# Patient Record
Sex: FEMALE
[De-identification: names, ages, dates, MRNs, and addresses within clinical notes are randomized per-mention and may not be internally consistent; named-entity substitution may affect disease eponyms.]

---

## 2017-04-06 ENCOUNTER — TRANSCRIPTION ENCOUNTER (OUTPATIENT)
Age: 52
End: 2017-04-06

## 2017-04-17 ENCOUNTER — FORM ENCOUNTER (OUTPATIENT)
Age: 52
End: 2017-04-17

## 2017-04-17 ENCOUNTER — APPOINTMENT (OUTPATIENT)
Dept: ORTHOPEDIC SURGERY | Facility: CLINIC | Age: 52
End: 2017-04-17

## 2017-04-17 VITALS
SYSTOLIC BLOOD PRESSURE: 133 MMHG | DIASTOLIC BLOOD PRESSURE: 83 MMHG | BODY MASS INDEX: 35.44 KG/M2 | HEART RATE: 80 BPM | WEIGHT: 200 LBS | HEIGHT: 63 IN

## 2017-04-17 DIAGNOSIS — Z78.9 OTHER SPECIFIED HEALTH STATUS: ICD-10-CM

## 2017-04-18 ENCOUNTER — APPOINTMENT (OUTPATIENT)
Dept: MRI IMAGING | Facility: CLINIC | Age: 52
End: 2017-04-18

## 2017-04-18 ENCOUNTER — OUTPATIENT (OUTPATIENT)
Dept: OUTPATIENT SERVICES | Facility: HOSPITAL | Age: 52
LOS: 1 days | End: 2017-04-18
Payer: COMMERCIAL

## 2017-04-18 DIAGNOSIS — Z98.89 OTHER SPECIFIED POSTPROCEDURAL STATES: Chronic | ICD-10-CM

## 2017-04-18 DIAGNOSIS — M25.562 PAIN IN LEFT KNEE: ICD-10-CM

## 2017-04-18 PROCEDURE — 73721 MRI JNT OF LWR EXTRE W/O DYE: CPT

## 2017-04-24 ENCOUNTER — APPOINTMENT (OUTPATIENT)
Dept: ORTHOPEDIC SURGERY | Facility: CLINIC | Age: 52
End: 2017-04-24

## 2017-04-24 VITALS
DIASTOLIC BLOOD PRESSURE: 84 MMHG | HEIGHT: 63 IN | HEART RATE: 81 BPM | WEIGHT: 200 LBS | BODY MASS INDEX: 35.44 KG/M2 | SYSTOLIC BLOOD PRESSURE: 136 MMHG

## 2017-06-08 ENCOUNTER — OTHER (OUTPATIENT)
Age: 52
End: 2017-06-08

## 2017-06-09 ENCOUNTER — OTHER (OUTPATIENT)
Age: 52
End: 2017-06-09

## 2017-06-12 ENCOUNTER — APPOINTMENT (OUTPATIENT)
Dept: ORTHOPEDIC SURGERY | Facility: CLINIC | Age: 52
End: 2017-06-12

## 2017-07-19 ENCOUNTER — EMERGENCY (EMERGENCY)
Facility: HOSPITAL | Age: 52
LOS: 1 days | Discharge: HOME | End: 2017-07-19
Attending: EMERGENCY MEDICINE | Admitting: EMERGENCY MEDICINE
Payer: COMMERCIAL

## 2017-07-19 VITALS
SYSTOLIC BLOOD PRESSURE: 120 MMHG | DIASTOLIC BLOOD PRESSURE: 70 MMHG | OXYGEN SATURATION: 99 % | RESPIRATION RATE: 18 BRPM | HEART RATE: 78 BPM | TEMPERATURE: 98 F

## 2017-07-19 VITALS
HEIGHT: 63 IN | SYSTOLIC BLOOD PRESSURE: 147 MMHG | HEART RATE: 73 BPM | WEIGHT: 199.96 LBS | TEMPERATURE: 98 F | RESPIRATION RATE: 15 BRPM | OXYGEN SATURATION: 100 % | DIASTOLIC BLOOD PRESSURE: 80 MMHG

## 2017-07-19 DIAGNOSIS — Z90.710 ACQUIRED ABSENCE OF BOTH CERVIX AND UTERUS: Chronic | ICD-10-CM

## 2017-07-19 DIAGNOSIS — Z98.89 OTHER SPECIFIED POSTPROCEDURAL STATES: Chronic | ICD-10-CM

## 2017-07-19 LAB
ALBUMIN SERPL ELPH-MCNC: 3.6 G/DL — SIGNIFICANT CHANGE UP (ref 3.3–5)
ALP SERPL-CCNC: 85 U/L — SIGNIFICANT CHANGE UP (ref 30–120)
ALT FLD-CCNC: 22 U/L DA — SIGNIFICANT CHANGE UP (ref 10–60)
ANION GAP SERPL CALC-SCNC: 6 MMOL/L — SIGNIFICANT CHANGE UP (ref 5–17)
APPEARANCE UR: CLEAR — SIGNIFICANT CHANGE UP
APTT BLD: 33.1 SEC — SIGNIFICANT CHANGE UP (ref 27.5–37.4)
AST SERPL-CCNC: 16 U/L — SIGNIFICANT CHANGE UP (ref 10–40)
BACTERIA # UR AUTO: ABNORMAL
BASOPHILS # BLD AUTO: 0.1 K/UL — SIGNIFICANT CHANGE UP (ref 0–0.2)
BASOPHILS NFR BLD AUTO: 1.1 % — SIGNIFICANT CHANGE UP (ref 0–2)
BILIRUB SERPL-MCNC: 0.4 MG/DL — SIGNIFICANT CHANGE UP (ref 0.2–1.2)
BILIRUB UR-MCNC: NEGATIVE — SIGNIFICANT CHANGE UP
BUN SERPL-MCNC: 10 MG/DL — SIGNIFICANT CHANGE UP (ref 7–23)
CALCIUM SERPL-MCNC: 9.8 MG/DL — SIGNIFICANT CHANGE UP (ref 8.4–10.5)
CHLORIDE SERPL-SCNC: 103 MMOL/L — SIGNIFICANT CHANGE UP (ref 96–108)
CK MB CFR SERPL CALC: 0.4 NG/ML — SIGNIFICANT CHANGE UP (ref 0–3.6)
CO2 SERPL-SCNC: 29 MMOL/L — SIGNIFICANT CHANGE UP (ref 22–31)
COLOR SPEC: SIGNIFICANT CHANGE UP
CREAT SERPL-MCNC: 0.76 MG/DL — SIGNIFICANT CHANGE UP (ref 0.5–1.3)
DIFF PNL FLD: ABNORMAL
EOSINOPHIL # BLD AUTO: 0.5 K/UL — SIGNIFICANT CHANGE UP (ref 0–0.5)
EOSINOPHIL NFR BLD AUTO: 5.1 % — SIGNIFICANT CHANGE UP (ref 0–6)
EPI CELLS # UR: SIGNIFICANT CHANGE UP
GLUCOSE SERPL-MCNC: 97 MG/DL — SIGNIFICANT CHANGE UP (ref 70–99)
GLUCOSE UR QL: NEGATIVE MG/DL — SIGNIFICANT CHANGE UP
HCT VFR BLD CALC: 40.5 % — SIGNIFICANT CHANGE UP (ref 34.5–45)
HGB BLD-MCNC: 12.7 G/DL — SIGNIFICANT CHANGE UP (ref 11.5–15.5)
INR BLD: 1 RATIO — SIGNIFICANT CHANGE UP (ref 0.88–1.16)
KETONES UR-MCNC: NEGATIVE — SIGNIFICANT CHANGE UP
LEUKOCYTE ESTERASE UR-ACNC: NEGATIVE — SIGNIFICANT CHANGE UP
LYMPHOCYTES # BLD AUTO: 2.6 K/UL — SIGNIFICANT CHANGE UP (ref 1–3.3)
LYMPHOCYTES # BLD AUTO: 26.2 % — SIGNIFICANT CHANGE UP (ref 13–44)
MCHC RBC-ENTMCNC: 25.4 PG — LOW (ref 27–34)
MCHC RBC-ENTMCNC: 31.3 GM/DL — LOW (ref 32–36)
MCV RBC AUTO: 81 FL — SIGNIFICANT CHANGE UP (ref 80–100)
MONOCYTES # BLD AUTO: 0.7 K/UL — SIGNIFICANT CHANGE UP (ref 0–0.9)
MONOCYTES NFR BLD AUTO: 7.5 % — SIGNIFICANT CHANGE UP (ref 2–14)
NEUTROPHILS # BLD AUTO: 6 K/UL — SIGNIFICANT CHANGE UP (ref 1.8–7.4)
NEUTROPHILS NFR BLD AUTO: 60.2 % — SIGNIFICANT CHANGE UP (ref 43–77)
NITRITE UR-MCNC: NEGATIVE — SIGNIFICANT CHANGE UP
PH UR: 6 — SIGNIFICANT CHANGE UP (ref 5–8)
PLATELET # BLD AUTO: 261 K/UL — SIGNIFICANT CHANGE UP (ref 150–400)
POTASSIUM SERPL-MCNC: 4.1 MMOL/L — SIGNIFICANT CHANGE UP (ref 3.5–5.3)
POTASSIUM SERPL-SCNC: 4.1 MMOL/L — SIGNIFICANT CHANGE UP (ref 3.5–5.3)
PROT SERPL-MCNC: 7.6 G/DL — SIGNIFICANT CHANGE UP (ref 6–8.3)
PROT UR-MCNC: NEGATIVE MG/DL — SIGNIFICANT CHANGE UP
PROTHROM AB SERPL-ACNC: 10.9 SEC — SIGNIFICANT CHANGE UP (ref 9.8–12.7)
RBC # BLD: 5 M/UL — SIGNIFICANT CHANGE UP (ref 3.8–5.2)
RBC # FLD: 12.7 % — SIGNIFICANT CHANGE UP (ref 10.3–14.5)
RBC CASTS # UR COMP ASSIST: SIGNIFICANT CHANGE UP /HPF (ref 0–4)
SODIUM SERPL-SCNC: 138 MMOL/L — SIGNIFICANT CHANGE UP (ref 135–145)
SP GR SPEC: 1.01 — SIGNIFICANT CHANGE UP (ref 1.01–1.02)
TROPONIN I SERPL-MCNC: 0 NG/ML — LOW (ref 0.02–0.06)
UROBILINOGEN FLD QL: NEGATIVE MG/DL — SIGNIFICANT CHANGE UP
WBC # BLD: 10 K/UL — SIGNIFICANT CHANGE UP (ref 3.8–10.5)
WBC # FLD AUTO: 10 K/UL — SIGNIFICANT CHANGE UP (ref 3.8–10.5)
WBC UR QL: SIGNIFICANT CHANGE UP

## 2017-07-19 PROCEDURE — 93010 ELECTROCARDIOGRAM REPORT: CPT

## 2017-07-19 PROCEDURE — 84484 ASSAY OF TROPONIN QUANT: CPT

## 2017-07-19 PROCEDURE — 99285 EMERGENCY DEPT VISIT HI MDM: CPT

## 2017-07-19 PROCEDURE — 85027 COMPLETE CBC AUTOMATED: CPT

## 2017-07-19 PROCEDURE — 81001 URINALYSIS AUTO W/SCOPE: CPT

## 2017-07-19 PROCEDURE — 82553 CREATINE MB FRACTION: CPT

## 2017-07-19 PROCEDURE — 80053 COMPREHEN METABOLIC PANEL: CPT

## 2017-07-19 PROCEDURE — 36415 COLL VENOUS BLD VENIPUNCTURE: CPT

## 2017-07-19 PROCEDURE — 71046 X-RAY EXAM CHEST 2 VIEWS: CPT

## 2017-07-19 PROCEDURE — 85730 THROMBOPLASTIN TIME PARTIAL: CPT

## 2017-07-19 PROCEDURE — 71020: CPT | Mod: 26

## 2017-07-19 PROCEDURE — 99284 EMERGENCY DEPT VISIT MOD MDM: CPT | Mod: 25

## 2017-07-19 PROCEDURE — 85610 PROTHROMBIN TIME: CPT

## 2017-07-19 PROCEDURE — 93005 ELECTROCARDIOGRAM TRACING: CPT

## 2017-07-19 NOTE — ED PROVIDER NOTE - MEDICAL DECISION MAKING DETAILS
51 yo female with chest pain since last night now resolved. PE normal, EKG normal. Unlikely to be CAD. Plan CE x 1, observe, refer for outpt workup.

## 2017-07-19 NOTE — ED PROVIDER NOTE - OBJECTIVE STATEMENT
53 y/o F pt w/ PMHx of HLD presents to the ED c/o CP. Pt states that last night she was having CP and today she is experiencing chest tightness and some SOB and dizziness, no CP today. Pt states she was just watching TV when she started noticing her symptoms. Pt believes she has some FHx of heart problems. Pt denies fever, cough, pleuritic pain, N/V/D or any other complaints at this time.

## 2017-07-19 NOTE — ED ADULT NURSE NOTE - OBJECTIVE STATEMENT
Patient walked into ER c/o pressure and heaviness to left upper chest  which started last night.  Patient took a baby aspirin at 06:30 today.  EKG done as soon as patient on stretcher.

## 2017-08-17 ENCOUNTER — LABORATORY RESULT (OUTPATIENT)
Age: 52
End: 2017-08-17

## 2017-08-17 ENCOUNTER — APPOINTMENT (OUTPATIENT)
Dept: FAMILY MEDICINE | Facility: CLINIC | Age: 52
End: 2017-08-17
Payer: COMMERCIAL

## 2017-08-17 ENCOUNTER — RESULT REVIEW (OUTPATIENT)
Age: 52
End: 2017-08-17

## 2017-08-17 VITALS
DIASTOLIC BLOOD PRESSURE: 60 MMHG | HEIGHT: 63 IN | WEIGHT: 200 LBS | BODY MASS INDEX: 35.44 KG/M2 | RESPIRATION RATE: 16 BRPM | SYSTOLIC BLOOD PRESSURE: 100 MMHG | HEART RATE: 72 BPM

## 2017-08-17 DIAGNOSIS — R23.8 OTHER SKIN CHANGES: ICD-10-CM

## 2017-08-17 PROCEDURE — 96127 BRIEF EMOTIONAL/BEHAV ASSMT: CPT

## 2017-08-17 PROCEDURE — 99386 PREV VISIT NEW AGE 40-64: CPT | Mod: 25

## 2017-08-17 PROCEDURE — 93000 ELECTROCARDIOGRAM COMPLETE: CPT

## 2017-08-17 PROCEDURE — 36415 COLL VENOUS BLD VENIPUNCTURE: CPT

## 2017-08-17 PROCEDURE — 81003 URINALYSIS AUTO W/O SCOPE: CPT | Mod: QW

## 2017-09-12 ENCOUNTER — APPOINTMENT (OUTPATIENT)
Dept: FAMILY MEDICINE | Facility: CLINIC | Age: 52
End: 2017-09-12
Payer: COMMERCIAL

## 2017-09-12 VITALS
WEIGHT: 200 LBS | DIASTOLIC BLOOD PRESSURE: 90 MMHG | SYSTOLIC BLOOD PRESSURE: 125 MMHG | BODY MASS INDEX: 35.44 KG/M2 | HEIGHT: 63 IN

## 2017-09-12 DIAGNOSIS — Z87.898 PERSONAL HISTORY OF OTHER SPECIFIED CONDITIONS: ICD-10-CM

## 2017-09-12 DIAGNOSIS — Z82.49 FAMILY HISTORY OF ISCHEMIC HEART DISEASE AND OTHER DISEASES OF THE CIRCULATORY SYSTEM: ICD-10-CM

## 2017-09-12 PROCEDURE — 99215 OFFICE O/P EST HI 40 MIN: CPT | Mod: 25

## 2017-09-12 PROCEDURE — 90471 IMMUNIZATION ADMIN: CPT

## 2017-09-12 PROCEDURE — 90670 PCV13 VACCINE IM: CPT

## 2017-09-12 RX ORDER — SIMVASTATIN 5 MG/1
5 TABLET, FILM COATED ORAL
Qty: 90 | Refills: 3 | Status: DISCONTINUED | COMMUNITY
End: 2017-09-12

## 2017-10-03 ENCOUNTER — APPOINTMENT (OUTPATIENT)
Dept: MAMMOGRAPHY | Facility: CLINIC | Age: 52
End: 2017-10-03
Payer: COMMERCIAL

## 2017-10-03 ENCOUNTER — OUTPATIENT (OUTPATIENT)
Dept: OUTPATIENT SERVICES | Facility: HOSPITAL | Age: 52
LOS: 1 days | End: 2017-10-03
Payer: COMMERCIAL

## 2017-10-03 DIAGNOSIS — Z90.710 ACQUIRED ABSENCE OF BOTH CERVIX AND UTERUS: Chronic | ICD-10-CM

## 2017-10-03 DIAGNOSIS — Z00.8 ENCOUNTER FOR OTHER GENERAL EXAMINATION: ICD-10-CM

## 2017-10-03 DIAGNOSIS — Z98.89 OTHER SPECIFIED POSTPROCEDURAL STATES: Chronic | ICD-10-CM

## 2017-10-03 PROCEDURE — 77063 BREAST TOMOSYNTHESIS BI: CPT | Mod: 26

## 2017-10-03 PROCEDURE — 77063 BREAST TOMOSYNTHESIS BI: CPT

## 2017-10-03 PROCEDURE — 77067 SCR MAMMO BI INCL CAD: CPT

## 2017-10-03 PROCEDURE — G0202: CPT | Mod: 26

## 2018-01-20 ENCOUNTER — APPOINTMENT (OUTPATIENT)
Dept: FAMILY MEDICINE | Facility: CLINIC | Age: 53
End: 2018-01-20
Payer: COMMERCIAL

## 2018-01-20 VITALS
SYSTOLIC BLOOD PRESSURE: 130 MMHG | DIASTOLIC BLOOD PRESSURE: 84 MMHG | HEIGHT: 63 IN | OXYGEN SATURATION: 97 % | RESPIRATION RATE: 16 BRPM | TEMPERATURE: 98.6 F | HEART RATE: 78 BPM | WEIGHT: 214 LBS | BODY MASS INDEX: 37.92 KG/M2

## 2018-01-20 DIAGNOSIS — M25.562 PAIN IN LEFT KNEE: ICD-10-CM

## 2018-01-20 DIAGNOSIS — M79.606 PAIN IN LEG, UNSPECIFIED: ICD-10-CM

## 2018-01-20 LAB — CYTOLOGY CVX/VAG DOC THIN PREP: NEGATIVE

## 2018-01-20 PROCEDURE — 36415 COLL VENOUS BLD VENIPUNCTURE: CPT

## 2018-01-20 PROCEDURE — 99214 OFFICE O/P EST MOD 30 MIN: CPT | Mod: 25

## 2018-01-21 ENCOUNTER — MOBILE ON CALL (OUTPATIENT)
Age: 53
End: 2018-01-21

## 2018-01-22 LAB
25(OH)D3 SERPL-MCNC: 22.5 NG/ML
ALBUMIN SERPL ELPH-MCNC: 4.3 G/DL
ALP BLD-CCNC: 70 U/L
ALT SERPL-CCNC: 21 U/L
ANION GAP SERPL CALC-SCNC: 11 MMOL/L
AST SERPL-CCNC: 22 U/L
BILIRUB SERPL-MCNC: 0.4 MG/DL
BUN SERPL-MCNC: 13 MG/DL
CALCIUM SERPL-MCNC: 10.5 MG/DL
CHLORIDE SERPL-SCNC: 100 MMOL/L
CHOLEST SERPL-MCNC: 205 MG/DL
CHOLEST/HDLC SERPL: 3.3 RATIO
CO2 SERPL-SCNC: 26 MMOL/L
CREAT SERPL-MCNC: 0.65 MG/DL
GLUCOSE SERPL-MCNC: 90 MG/DL
HDLC SERPL-MCNC: 63 MG/DL
LDLC SERPL CALC-MCNC: 116 MG/DL
POTASSIUM SERPL-SCNC: 4.6 MMOL/L
PROT SERPL-MCNC: 7.6 G/DL
SODIUM SERPL-SCNC: 137 MMOL/L
TRIGL SERPL-MCNC: 128 MG/DL

## 2018-01-23 ENCOUNTER — APPOINTMENT (OUTPATIENT)
Dept: FAMILY MEDICINE | Facility: CLINIC | Age: 53
End: 2018-01-23

## 2018-05-04 ENCOUNTER — APPOINTMENT (OUTPATIENT)
Dept: FAMILY MEDICINE | Facility: CLINIC | Age: 53
End: 2018-05-04
Payer: COMMERCIAL

## 2018-05-04 VITALS
HEIGHT: 63 IN | TEMPERATURE: 98.5 F | BODY MASS INDEX: 37.92 KG/M2 | SYSTOLIC BLOOD PRESSURE: 118 MMHG | RESPIRATION RATE: 16 BRPM | WEIGHT: 214 LBS | HEART RATE: 88 BPM | DIASTOLIC BLOOD PRESSURE: 82 MMHG | OXYGEN SATURATION: 98 %

## 2018-05-04 DIAGNOSIS — L30.9 DERMATITIS, UNSPECIFIED: ICD-10-CM

## 2018-05-04 DIAGNOSIS — Z91.010 ALLERGY TO PEANUTS: ICD-10-CM

## 2018-05-04 DIAGNOSIS — J30.9 ALLERGIC RHINITIS, UNSPECIFIED: ICD-10-CM

## 2018-05-04 LAB
BILIRUB UR QL STRIP: NORMAL
BILIRUB UR QL STRIP: NORMAL
CLARITY UR: CLEAR
CLARITY UR: NORMAL
COLLECTION METHOD: NORMAL
GLUCOSE UR-MCNC: NORMAL
GLUCOSE UR-MCNC: NORMAL
HCG UR QL: 0.2 EU/DL
HCG UR QL: 0.2 EU/DL
HGB UR QL STRIP.AUTO: NORMAL
HGB UR QL STRIP.AUTO: NORMAL
KETONES UR-MCNC: NORMAL
KETONES UR-MCNC: NORMAL
LEUKOCYTE ESTERASE UR QL STRIP: NORMAL
LEUKOCYTE ESTERASE UR QL STRIP: NORMAL
NITRITE UR QL STRIP: NORMAL
NITRITE UR QL STRIP: NORMAL
PH UR STRIP: 5.5
PH UR STRIP: 6
PROT UR STRIP-MCNC: NORMAL
PROT UR STRIP-MCNC: NORMAL
SP GR UR STRIP: 1.1
SP GR UR STRIP: >=1.03

## 2018-05-04 PROCEDURE — 99214 OFFICE O/P EST MOD 30 MIN: CPT | Mod: 25

## 2018-05-04 PROCEDURE — 81003 URINALYSIS AUTO W/O SCOPE: CPT | Mod: QW

## 2018-05-04 RX ORDER — NAPROXEN 500 MG/1
500 TABLET ORAL
Qty: 60 | Refills: 1 | Status: COMPLETED | COMMUNITY
Start: 2018-01-20 | End: 2018-05-04

## 2018-05-05 ENCOUNTER — MOBILE ON CALL (OUTPATIENT)
Age: 53
End: 2018-05-05

## 2018-05-07 LAB
APPEARANCE: CLEAR
BACTERIA UR CULT: NORMAL
BILIRUBIN URINE: NEGATIVE
BLOOD URINE: NEGATIVE
COLOR: YELLOW
GLUCOSE QUALITATIVE U: NEGATIVE MG/DL
KETONES URINE: NEGATIVE
LEUKOCYTE ESTERASE URINE: NEGATIVE
NITRITE URINE: NEGATIVE
PH URINE: 5.5
PROTEIN URINE: NEGATIVE MG/DL
SPECIFIC GRAVITY URINE: 1.02
UROBILINOGEN URINE: NEGATIVE MG/DL

## 2018-06-18 ENCOUNTER — INPATIENT (INPATIENT)
Facility: HOSPITAL | Age: 53
LOS: 0 days | Discharge: HOME | DRG: 69 | End: 2018-06-19
Attending: INTERNAL MEDICINE | Admitting: INTERNAL MEDICINE
Payer: COMMERCIAL

## 2018-06-18 VITALS
WEIGHT: 210.1 LBS | SYSTOLIC BLOOD PRESSURE: 144 MMHG | RESPIRATION RATE: 16 BRPM | TEMPERATURE: 98 F | HEIGHT: 63 IN | DIASTOLIC BLOOD PRESSURE: 85 MMHG | HEART RATE: 88 BPM | OXYGEN SATURATION: 98 %

## 2018-06-18 DIAGNOSIS — E78.5 HYPERLIPIDEMIA, UNSPECIFIED: ICD-10-CM

## 2018-06-18 DIAGNOSIS — Z29.9 ENCOUNTER FOR PROPHYLACTIC MEASURES, UNSPECIFIED: ICD-10-CM

## 2018-06-18 DIAGNOSIS — G45.9 TRANSIENT CEREBRAL ISCHEMIC ATTACK, UNSPECIFIED: ICD-10-CM

## 2018-06-18 DIAGNOSIS — E89.0 POSTPROCEDURAL HYPOTHYROIDISM: ICD-10-CM

## 2018-06-18 DIAGNOSIS — Z98.89 OTHER SPECIFIED POSTPROCEDURAL STATES: Chronic | ICD-10-CM

## 2018-06-18 DIAGNOSIS — Z90.710 ACQUIRED ABSENCE OF BOTH CERVIX AND UTERUS: Chronic | ICD-10-CM

## 2018-06-18 LAB
ALBUMIN SERPL ELPH-MCNC: 3.7 G/DL — SIGNIFICANT CHANGE UP (ref 3.3–5)
ALP SERPL-CCNC: 76 U/L — SIGNIFICANT CHANGE UP (ref 30–120)
ALT FLD-CCNC: 26 U/L DA — SIGNIFICANT CHANGE UP (ref 10–60)
ANION GAP SERPL CALC-SCNC: 9 MMOL/L — SIGNIFICANT CHANGE UP (ref 5–17)
APTT BLD: 33.1 SEC — SIGNIFICANT CHANGE UP (ref 27.5–37.4)
AST SERPL-CCNC: 21 U/L — SIGNIFICANT CHANGE UP (ref 10–40)
BASOPHILS # BLD AUTO: 0.1 K/UL — SIGNIFICANT CHANGE UP (ref 0–0.2)
BASOPHILS NFR BLD AUTO: 1 % — SIGNIFICANT CHANGE UP (ref 0–2)
BILIRUB SERPL-MCNC: 0.4 MG/DL — SIGNIFICANT CHANGE UP (ref 0.2–1.2)
BUN SERPL-MCNC: 14 MG/DL — SIGNIFICANT CHANGE UP (ref 7–23)
CALCIUM SERPL-MCNC: 9.9 MG/DL — SIGNIFICANT CHANGE UP (ref 8.4–10.5)
CHLORIDE SERPL-SCNC: 101 MMOL/L — SIGNIFICANT CHANGE UP (ref 96–108)
CK MB BLD-MCNC: 0.8 % — SIGNIFICANT CHANGE UP (ref 0–3.5)
CK MB CFR SERPL CALC: 0.9 NG/ML — SIGNIFICANT CHANGE UP (ref 0–3.6)
CK SERPL-CCNC: 115 U/L — SIGNIFICANT CHANGE UP (ref 26–192)
CO2 SERPL-SCNC: 26 MMOL/L — SIGNIFICANT CHANGE UP (ref 22–31)
CREAT SERPL-MCNC: 0.68 MG/DL — SIGNIFICANT CHANGE UP (ref 0.5–1.3)
EOSINOPHIL # BLD AUTO: 0.5 K/UL — SIGNIFICANT CHANGE UP (ref 0–0.5)
EOSINOPHIL NFR BLD AUTO: 4.9 % — SIGNIFICANT CHANGE UP (ref 0–6)
GLUCOSE BLDC GLUCOMTR-MCNC: 108 MG/DL — HIGH (ref 70–99)
GLUCOSE SERPL-MCNC: 107 MG/DL — HIGH (ref 70–99)
HCT VFR BLD CALC: 38.2 % — SIGNIFICANT CHANGE UP (ref 34.5–45)
HGB BLD-MCNC: 12.7 G/DL — SIGNIFICANT CHANGE UP (ref 11.5–15.5)
INR BLD: 1.04 RATIO — SIGNIFICANT CHANGE UP (ref 0.88–1.16)
LYMPHOCYTES # BLD AUTO: 2.7 K/UL — SIGNIFICANT CHANGE UP (ref 1–3.3)
LYMPHOCYTES # BLD AUTO: 24.6 % — SIGNIFICANT CHANGE UP (ref 13–44)
MCHC RBC-ENTMCNC: 26.8 PG — LOW (ref 27–34)
MCHC RBC-ENTMCNC: 33.2 GM/DL — SIGNIFICANT CHANGE UP (ref 32–36)
MCV RBC AUTO: 80.8 FL — SIGNIFICANT CHANGE UP (ref 80–100)
MONOCYTES # BLD AUTO: 0.7 K/UL — SIGNIFICANT CHANGE UP (ref 0–0.9)
MONOCYTES NFR BLD AUTO: 6.3 % — SIGNIFICANT CHANGE UP (ref 2–14)
NEUTROPHILS # BLD AUTO: 6.8 K/UL — SIGNIFICANT CHANGE UP (ref 1.8–7.4)
NEUTROPHILS NFR BLD AUTO: 63.2 % — SIGNIFICANT CHANGE UP (ref 43–77)
PLATELET # BLD AUTO: 248 K/UL — SIGNIFICANT CHANGE UP (ref 150–400)
POTASSIUM SERPL-MCNC: 4 MMOL/L — SIGNIFICANT CHANGE UP (ref 3.5–5.3)
POTASSIUM SERPL-SCNC: 4 MMOL/L — SIGNIFICANT CHANGE UP (ref 3.5–5.3)
PROT SERPL-MCNC: 7.8 G/DL — SIGNIFICANT CHANGE UP (ref 6–8.3)
PROTHROM AB SERPL-ACNC: 11.4 SEC — SIGNIFICANT CHANGE UP (ref 9.8–12.7)
RBC # BLD: 4.72 M/UL — SIGNIFICANT CHANGE UP (ref 3.8–5.2)
RBC # FLD: 12.7 % — SIGNIFICANT CHANGE UP (ref 10.3–14.5)
SODIUM SERPL-SCNC: 136 MMOL/L — SIGNIFICANT CHANGE UP (ref 135–145)
TROPONIN I SERPL-MCNC: 0 NG/ML — LOW (ref 0.02–0.06)
WBC # BLD: 10.8 K/UL — HIGH (ref 3.8–10.5)
WBC # FLD AUTO: 10.8 K/UL — HIGH (ref 3.8–10.5)

## 2018-06-18 PROCEDURE — 70450 CT HEAD/BRAIN W/O DYE: CPT | Mod: 26,59

## 2018-06-18 PROCEDURE — 70498 CT ANGIOGRAPHY NECK: CPT | Mod: 26

## 2018-06-18 PROCEDURE — 70496 CT ANGIOGRAPHY HEAD: CPT | Mod: 26

## 2018-06-18 PROCEDURE — 93010 ELECTROCARDIOGRAM REPORT: CPT

## 2018-06-18 PROCEDURE — 71045 X-RAY EXAM CHEST 1 VIEW: CPT | Mod: 26

## 2018-06-18 PROCEDURE — 93306 TTE W/DOPPLER COMPLETE: CPT | Mod: 26

## 2018-06-18 PROCEDURE — 99255 IP/OBS CONSLTJ NEW/EST HI 80: CPT

## 2018-06-18 PROCEDURE — 99285 EMERGENCY DEPT VISIT HI MDM: CPT

## 2018-06-18 RX ORDER — SIMVASTATIN 20 MG/1
5 TABLET, FILM COATED ORAL AT BEDTIME
Qty: 0 | Refills: 0 | Status: DISCONTINUED | OUTPATIENT
Start: 2018-06-18 | End: 2018-06-19

## 2018-06-18 RX ORDER — ACETAMINOPHEN 500 MG
650 TABLET ORAL EVERY 6 HOURS
Qty: 0 | Refills: 0 | Status: DISCONTINUED | OUTPATIENT
Start: 2018-06-18 | End: 2018-06-19

## 2018-06-18 RX ORDER — ENOXAPARIN SODIUM 100 MG/ML
40 INJECTION SUBCUTANEOUS DAILY
Qty: 0 | Refills: 0 | Status: DISCONTINUED | OUTPATIENT
Start: 2018-06-18 | End: 2018-06-19

## 2018-06-18 RX ORDER — ASPIRIN/CALCIUM CARB/MAGNESIUM 324 MG
325 TABLET ORAL DAILY
Qty: 0 | Refills: 0 | Status: DISCONTINUED | OUTPATIENT
Start: 2018-06-18 | End: 2018-06-19

## 2018-06-18 RX ORDER — SODIUM CHLORIDE 9 MG/ML
3 INJECTION INTRAMUSCULAR; INTRAVENOUS; SUBCUTANEOUS EVERY 8 HOURS
Qty: 0 | Refills: 0 | Status: DISCONTINUED | OUTPATIENT
Start: 2018-06-18 | End: 2018-06-19

## 2018-06-18 RX ADMIN — SIMVASTATIN 5 MILLIGRAM(S): 20 TABLET, FILM COATED ORAL at 21:14

## 2018-06-18 RX ADMIN — ENOXAPARIN SODIUM 40 MILLIGRAM(S): 100 INJECTION SUBCUTANEOUS at 20:10

## 2018-06-18 RX ADMIN — SODIUM CHLORIDE 3 MILLILITER(S): 9 INJECTION INTRAMUSCULAR; INTRAVENOUS; SUBCUTANEOUS at 21:14

## 2018-06-18 NOTE — ED ADULT NURSE REASSESSMENT NOTE - NS ED NURSE REASSESS COMMENT FT1
Pt reported numbness of left side arm improving. Pt seen and examined by Dr Baca. Stroke protocol Discontinued

## 2018-06-18 NOTE — H&P ADULT - NEUROLOGICAL DETAILS
responds to verbal commands/sensation intact/cranial nerves intact/deep reflexes intact/superficial reflexes intact/normal strength/responds to pain/alert and oriented x 3

## 2018-06-18 NOTE — CONSULT NOTE ADULT - ASSESSMENT
Clinical impression is most likely right hemispheric TIA rule out cerebrovascular accident.    I would recommend telemetry evaluation to rule out underlying arrhythmia.  I would recommend echocardiogram to evaluate ejection fraction.  I would recommend MRI of the brain.  I would recommend to check TSH and lipid panel.  I will start the patient on aspirin 325 mg once a day.  I will recommend cholesterol medications.  I will continue to followup.

## 2018-06-18 NOTE — CONSULT NOTE ADULT - ASSESSMENT
PROBLEM Dx:  TIA  HYPERLIPIDEMIA  OBESITY PROBLEM Dx:  TIA  HYPERLIPIDEMIA  OBESITY    DISCUSSION:    52 y/o F pt with hx HLD and partial thyroidectomy presents to the ED c/o numbness and tingling sensation in the left arm and left side of face today at 10:55am at work. Pt was typing when she ad a sudden onset of numbness and tingling and momentary loss of strength in the left arm, lasting approximately a couple seconds. Pt has taken a Alcides aspirin this morning before onset of sx. Also relays that she felt a headache in the posterior aspect of the head yesterday, but did not have a headache today. Sx have begun to resolve on their own. Denies chest pain, nausea, vomiting, fever, headache, difficulty ambulating, slurring of speech, LOC, or confusion. No other complaints    ·  Problem: Transient cerebral ischemia, unspecified type.   aspirin, statin, cardiac monitoring,   2D ECHO  CAROTID DOPPLER  neuro evaluation     ·  Problem: Dyslipidemia.     statin, lipid profile      discussed with staff

## 2018-06-18 NOTE — ED PROVIDER NOTE - OBJECTIVE STATEMENT
52 y/o F pt with hx HLD and partial thyroidectomy presents to the ED c/o numbness and tingling sensation in the left arm and left side of face today at 10:55am at work. Pt was typing when she had a sudden onset of numbness and tingling and momentary loss of strength in the left arm, lasting approximately a couple seconds. Pt has taken a Alcides aspirin this morning before onset of sx. Also relays that she felt a headache in the posterior aspect of the head yesterday, but did not have a headache today. Denies chest pain, nausea, vomiting, fever, headache, difficulty ambulating, slurring of speech, LOC, or confusion. No other complaints at this time.     Dr. Ruth Rodriguez -pmd 54 y/o F pt with hx HLD and partial thyroidectomy presents to the ED c/o numbness and tingling sensation in the left arm and left side of face today at 10:55am at work. Pt was typing when she had a sudden onset of numbness and tingling and momentary loss of strength in the left arm, lasting approximately a couple seconds. Pt has taken a Alcides aspirin this morning before onset of sx. Also relays that she felt a headache in the posterior aspect of the head yesterday, but did not have a headache today. Sx have begun to resolve on their own. Denies chest pain, nausea, vomiting, fever, headache, difficulty ambulating, slurring of speech, LOC, or confusion. No other complaints at this time.     Dr. Ruth Rodriguez -pmd

## 2018-06-18 NOTE — CONSULT NOTE ADULT - SUBJECTIVE AND OBJECTIVE BOX
PCP:    REQUESTING PHYSICIAN:    REASON FOR CONSULT:    CHIEF COMPLAINT:    HPI:      PAST MEDICAL & SURGICAL HISTORY:  Dyslipidemia  H/O: hysterectomy  H/O partial thyroidectomy: 1997      Allergies    No Known Allergies    Intolerances        SOCIAL HISTORY:    FAMILY HISTORY:  Family history of hypertension      MEDICATIONS:  MEDICATIONS  (STANDING):    MEDICATIONS  (PRN):      REVIEW OF SYSTEMS:    CONSTITUTIONAL: No weakness, fevers or chills  EYES/ENT: No visual changes;  No vertigo or throat pain   NECK: No pain or stiffness  RESPIRATORY: No cough, wheezing, hemoptysis; No shortness of breath  CARDIOVASCULAR: No chest pain or palpitations  GASTROINTESTINAL: No abdominal or epigastric pain. No nausea, vomiting, or hematemesis; No diarrhea or constipation. No melena or hematochezia.  GENITOURINARY: No dysuria, frequency or hematuria  NEUROLOGICAL: No numbness or weakness  SKIN: No itching, burning, rashes, or lesions   All other review of systems is negative unless indicated above    Vital Signs Last 24 Hrs  T(C): 36.8 (18 Jun 2018 12:06), Max: 36.8 (18 Jun 2018 12:06)  T(F): 98.2 (18 Jun 2018 12:06), Max: 98.2 (18 Jun 2018 12:06)  HR: 88 (18 Jun 2018 12:06) (88 - 88)  BP: 144/85 (18 Jun 2018 12:06) (144/85 - 144/85)  BP(mean): --  RR: 16 (18 Jun 2018 12:06) (16 - 16)  SpO2: 98% (18 Jun 2018 12:06) (98% - 98%)    I&O's Summary      PHYSICAL EXAM:    Constitutional: NAD, awake and alert, well-developed  HEENT: PERR, EOMI,  No oral cyananosis.  Neck:  supple,  No JVD  Respiratory: Breath sounds are clear bilaterally, No wheezing, rales or rhonchi  Cardiovascular: S1 and S2, regular rate and rhythm, no Murmurs, gallops or rubs  Gastrointestinal: Bowel Sounds present, soft, nontender.   Extremities: No peripheral edema. No clubbing or cyanosis.  Vascular: 2+ peripheral pulses  Neurological: A/O x 3, no focal deficits  Musculoskeletal: no calf tenderness.  Skin: No rashes.      LABS: All Labs Reviewed:                        12.7   10.8  )-----------( 248      ( 18 Jun 2018 12:55 )             38.2     18 Jun 2018 12:55    136    |  101    |  14     ----------------------------<  107    4.0     |  26     |  0.68     Ca    9.9        18 Jun 2018 12:55    TPro  7.8    /  Alb  3.7    /  TBili  0.4    /  DBili  x      /  AST  21     /  ALT  26     /  AlkPhos  76     18 Jun 2018 12:55    PT/INR - ( 18 Jun 2018 12:55 )   PT: 11.4 sec;   INR: 1.04 ratio         PTT - ( 18 Jun 2018 12:55 )  PTT:33.1 sec  CARDIAC MARKERS ( 18 Jun 2018 12:55 )  .000 ng/mL / x     / 115 U/L / x     / 0.9 ng/mL      Blood Culture:     ECHO,CAROTID DOPPLER ORDERED    RADIOLOGY/EKG: PCP:    REQUESTING PHYSICIAN:    REASON FOR CONSULT:    CHIEF COMPLAINT:    HPI:  	  52 y/o F pt with hx HLD and partial thyroidectomy presents to the ED c/o numbness and tingling sensation in the left arm and left side of face today at 10:55am at work. Pt was typing when she had a sudden onset of numbness and tingling and momentary loss of strength in the left arm, lasting approximately a couple seconds. Pt has taken a Alcides aspirin this morning before onset of sx. Also relays that she felt a headache in the posterior aspect of the head yesterday, but did not have a headache today. Sx have begun to resolve on their own. Denies chest pain, nausea, vomiting, fever, headache, difficulty ambulating, slurring of speech, LOC, or confusion. No other complaints at this time.                      	      PAST MEDICAL & SURGICAL HISTORY:  Dyslipidemia  H/O: hysterectomy  H/O partial thyroidectomy: 1997      Allergies    No Known Allergies        SOCIAL HISTORY:  non smoker    FAMILY HISTORY:  Family history of hypertension    MEDICATIONS  (STANDING):  aspirin 325 milliGRAM(s) Oral daily  enoxaparin Injectable 40 milliGRAM(s) SubCutaneous daily  simvastatin 5 milliGRAM(s) Oral at bedtime  sodium chloride 0.9% lock flush 3 milliLiter(s) IV Push every 8 hours    MEDICATIONS  (PRN):  acetaminophen   Tablet. 650 milliGRAM(s) Oral every 6 hours PRN Mild Pain (1 - 3)      REVIEW OF SYSTEMS:    CONSTITUTIONAL: No weakness, fevers or chills  EYES/ENT: No visual changes;  No vertigo or throat pain   NECK: No pain or stiffness  RESPIRATORY: No cough, wheezing, hemoptysis; No shortness of breath  CARDIOVASCULAR: No chest pain or palpitations  GASTROINTESTINAL: No abdominal or epigastric pain. No nausea, vomiting, or hematemesis; No diarrhea or constipation. No melena or hematochezia.  GENITOURINARY: No dysuria, frequency or hematuria  NEUROLOGICAL: No numbness or weakness  SKIN: No itching, burning, rashes, or lesions   All other review of systems is negative unless indicated above    Vital Signs Last 24 Hrs  T(C): 36.8 (18 Jun 2018 12:06), Max: 36.8 (18 Jun 2018 12:06)  T(F): 98.2 (18 Jun 2018 12:06), Max: 98.2 (18 Jun 2018 12:06)  HR: 88 (18 Jun 2018 12:06) (88 - 88)  BP: 144/85 (18 Jun 2018 12:06) (144/85 - 144/85)  BP(mean): --  RR: 16 (18 Jun 2018 12:06) (16 - 16)  SpO2: 98% (18 Jun 2018 12:06) (98% - 98%)    I&O's Summary      PHYSICAL EXAM:    Constitutional: NAD, awake and alert, well-developed  HEENT: PERR, EOMI,  No oral cyananosis.  Neck:  supple,  No JVD  Respiratory: Breath sounds are clear bilaterally, No wheezing, rales or rhonchi  Cardiovascular: S1 and S2, regular rate and rhythm, no Murmurs, gallops or rubs  Gastrointestinal: Bowel Sounds present, soft, nontender.   Extremities: No peripheral edema. No clubbing or cyanosis.  Vascular: 2+ peripheral pulses  Neurological: A/O x 3, no focal deficits  Musculoskeletal: no calf tenderness.  Skin: No rashes.      LABS: All Labs Reviewed:                        12.7   10.8  )-----------( 248      ( 18 Jun 2018 12:55 )             38.2     18 Jun 2018 12:55    136    |  101    |  14     ----------------------------<  107    4.0     |  26     |  0.68     Ca    9.9        18 Jun 2018 12:55    TPro  7.8    /  Alb  3.7    /  TBili  0.4    /  DBili  x      /  AST  21     /  ALT  26     /  AlkPhos  76     18 Jun 2018 12:55    PT/INR - ( 18 Jun 2018 12:55 )   PT: 11.4 sec;   INR: 1.04 ratio         PTT - ( 18 Jun 2018 12:55 )  PTT:33.1 sec  CARDIAC MARKERS ( 18 Jun 2018 12:55 )  .000 ng/mL / x     / 115 U/L / x     / 0.9 ng/mL      Blood Culture:     ECHO,CAROTID DOPPLER ORDERED-pending    RADIOLOGY/EKG:  NSR,NON SPECIFIC ST-T CHANGES  [facility]

## 2018-06-19 ENCOUNTER — OUTPATIENT (OUTPATIENT)
Dept: OUTPATIENT SERVICES | Facility: HOSPITAL | Age: 53
LOS: 1 days | End: 2018-06-19
Payer: COMMERCIAL

## 2018-06-19 ENCOUNTER — TRANSCRIPTION ENCOUNTER (OUTPATIENT)
Age: 53
End: 2018-06-19

## 2018-06-19 VITALS
RESPIRATION RATE: 16 BRPM | SYSTOLIC BLOOD PRESSURE: 128 MMHG | TEMPERATURE: 98 F | DIASTOLIC BLOOD PRESSURE: 82 MMHG | HEART RATE: 88 BPM | OXYGEN SATURATION: 99 %

## 2018-06-19 DIAGNOSIS — Z90.710 ACQUIRED ABSENCE OF BOTH CERVIX AND UTERUS: Chronic | ICD-10-CM

## 2018-06-19 DIAGNOSIS — Z98.89 OTHER SPECIFIED POSTPROCEDURAL STATES: Chronic | ICD-10-CM

## 2018-06-19 DIAGNOSIS — R55 SYNCOPE AND COLLAPSE: ICD-10-CM

## 2018-06-19 LAB
ANION GAP SERPL CALC-SCNC: 5 MMOL/L — SIGNIFICANT CHANGE UP (ref 5–17)
BUN SERPL-MCNC: 11 MG/DL — SIGNIFICANT CHANGE UP (ref 7–23)
CALCIUM SERPL-MCNC: 10 MG/DL — SIGNIFICANT CHANGE UP (ref 8.4–10.5)
CHLORIDE SERPL-SCNC: 104 MMOL/L — SIGNIFICANT CHANGE UP (ref 96–108)
CHOLEST SERPL-MCNC: 195 MG/DL — SIGNIFICANT CHANGE UP (ref 10–199)
CO2 SERPL-SCNC: 29 MMOL/L — SIGNIFICANT CHANGE UP (ref 22–31)
CREAT SERPL-MCNC: 0.67 MG/DL — SIGNIFICANT CHANGE UP (ref 0.5–1.3)
GLUCOSE SERPL-MCNC: 112 MG/DL — HIGH (ref 70–99)
HCT VFR BLD CALC: 39.4 % — SIGNIFICANT CHANGE UP (ref 34.5–45)
HDLC SERPL-MCNC: 44 MG/DL — SIGNIFICANT CHANGE UP (ref 40–125)
HGB BLD-MCNC: 12.8 G/DL — SIGNIFICANT CHANGE UP (ref 11.5–15.5)
LIPID PNL WITH DIRECT LDL SERPL: 99 MG/DL — SIGNIFICANT CHANGE UP
MCHC RBC-ENTMCNC: 26.3 PG — LOW (ref 27–34)
MCHC RBC-ENTMCNC: 32.6 GM/DL — SIGNIFICANT CHANGE UP (ref 32–36)
MCV RBC AUTO: 80.8 FL — SIGNIFICANT CHANGE UP (ref 80–100)
PLATELET # BLD AUTO: 252 K/UL — SIGNIFICANT CHANGE UP (ref 150–400)
POTASSIUM SERPL-MCNC: 4.3 MMOL/L — SIGNIFICANT CHANGE UP (ref 3.5–5.3)
POTASSIUM SERPL-SCNC: 4.3 MMOL/L — SIGNIFICANT CHANGE UP (ref 3.5–5.3)
RBC # BLD: 4.87 M/UL — SIGNIFICANT CHANGE UP (ref 3.8–5.2)
RBC # FLD: 12.2 % — SIGNIFICANT CHANGE UP (ref 10.3–14.5)
SODIUM SERPL-SCNC: 138 MMOL/L — SIGNIFICANT CHANGE UP (ref 135–145)
T3 SERPL-MCNC: 122 NG/DL — SIGNIFICANT CHANGE UP (ref 80–200)
T4 AB SER-ACNC: 6.4 UG/DL — SIGNIFICANT CHANGE UP (ref 4.6–12)
TOTAL CHOLESTEROL/HDL RATIO MEASUREMENT: 4.4 RATIO — SIGNIFICANT CHANGE UP (ref 3.3–7.1)
TRIGL SERPL-MCNC: 262 MG/DL — HIGH (ref 10–149)
TSH SERPL-MCNC: 1.72 UIU/ML — SIGNIFICANT CHANGE UP (ref 0.27–4.2)
TSH SERPL-MCNC: 2.13 UIU/ML — SIGNIFICANT CHANGE UP (ref 0.27–4.2)
WBC # BLD: 8.6 K/UL — SIGNIFICANT CHANGE UP (ref 3.8–10.5)
WBC # FLD AUTO: 8.6 K/UL — SIGNIFICANT CHANGE UP (ref 3.8–10.5)

## 2018-06-19 PROCEDURE — 99291 CRITICAL CARE FIRST HOUR: CPT | Mod: 25

## 2018-06-19 PROCEDURE — 70551 MRI BRAIN STEM W/O DYE: CPT

## 2018-06-19 PROCEDURE — 70498 CT ANGIOGRAPHY NECK: CPT

## 2018-06-19 PROCEDURE — 84480 ASSAY TRIIODOTHYRONINE (T3): CPT

## 2018-06-19 PROCEDURE — 99232 SBSQ HOSP IP/OBS MODERATE 35: CPT

## 2018-06-19 PROCEDURE — 84436 ASSAY OF TOTAL THYROXINE: CPT

## 2018-06-19 PROCEDURE — 80061 LIPID PANEL: CPT

## 2018-06-19 PROCEDURE — 85730 THROMBOPLASTIN TIME PARTIAL: CPT

## 2018-06-19 PROCEDURE — 82962 GLUCOSE BLOOD TEST: CPT

## 2018-06-19 PROCEDURE — 82550 ASSAY OF CK (CPK): CPT

## 2018-06-19 PROCEDURE — 82553 CREATINE MB FRACTION: CPT

## 2018-06-19 PROCEDURE — 93005 ELECTROCARDIOGRAM TRACING: CPT

## 2018-06-19 PROCEDURE — 70496 CT ANGIOGRAPHY HEAD: CPT

## 2018-06-19 PROCEDURE — 85610 PROTHROMBIN TIME: CPT

## 2018-06-19 PROCEDURE — 70551 MRI BRAIN STEM W/O DYE: CPT | Mod: 26

## 2018-06-19 PROCEDURE — 70450 CT HEAD/BRAIN W/O DYE: CPT

## 2018-06-19 PROCEDURE — 71045 X-RAY EXAM CHEST 1 VIEW: CPT

## 2018-06-19 PROCEDURE — 36415 COLL VENOUS BLD VENIPUNCTURE: CPT

## 2018-06-19 PROCEDURE — 84443 ASSAY THYROID STIM HORMONE: CPT

## 2018-06-19 PROCEDURE — 93306 TTE W/DOPPLER COMPLETE: CPT

## 2018-06-19 PROCEDURE — 80053 COMPREHEN METABOLIC PANEL: CPT

## 2018-06-19 PROCEDURE — 85027 COMPLETE CBC AUTOMATED: CPT

## 2018-06-19 PROCEDURE — 84484 ASSAY OF TROPONIN QUANT: CPT

## 2018-06-19 PROCEDURE — 80048 BASIC METABOLIC PNL TOTAL CA: CPT

## 2018-06-19 RX ORDER — ASPIRIN/CALCIUM CARB/MAGNESIUM 324 MG
1 TABLET ORAL
Qty: 0 | Refills: 0 | DISCHARGE
Start: 2018-06-19

## 2018-06-19 RX ADMIN — Medication 325 MILLIGRAM(S): at 11:19

## 2018-06-19 RX ADMIN — ENOXAPARIN SODIUM 40 MILLIGRAM(S): 100 INJECTION SUBCUTANEOUS at 11:19

## 2018-06-19 RX ADMIN — SODIUM CHLORIDE 3 MILLILITER(S): 9 INJECTION INTRAMUSCULAR; INTRAVENOUS; SUBCUTANEOUS at 05:27

## 2018-06-19 NOTE — DISCHARGE NOTE ADULT - PATIENT PORTAL LINK FT
You can access the [facility] Patient Portal, offered by [facility], by registering with the following website: [email or web address]

## 2018-06-19 NOTE — PROGRESS NOTE ADULT - ATTENDING COMMENTS
60 minutes spent on this visit, 50% visit time spent in care co-ordination with other attendings and counselling patient  I have discussed care plan with patient and HCP,expressed understanding of problems treatment and their effect and side effects, alternatives in detail,I have asked if they have any questions and concerns and appropriately addressed them to best of my ability  Reviewed all diagonostic tests, lab results and drug drug interactions, and medications

## 2018-06-19 NOTE — PROGRESS NOTE ADULT - SUBJECTIVE AND OBJECTIVE BOX
Patient is a 53y old  Female who presents with a chief complaint of left side upper extremity weakness (19 Jun 2018 08:13)      INTERVAL HPI/OVERNIGHT EVENTS:  no acute event overnight  Home Medications:  simvastatin 5 mg oral tablet: 1 tab(s) orally once a day (at bedtime) (19 Jul 2017 12:07)      MEDICATIONS  (STANDING):  aspirin 325 milliGRAM(s) Oral daily  enoxaparin Injectable 40 milliGRAM(s) SubCutaneous daily  simvastatin 5 milliGRAM(s) Oral at bedtime  sodium chloride 0.9% lock flush 3 milliLiter(s) IV Push every 8 hours    MEDICATIONS  (PRN):  acetaminophen   Tablet. 650 milliGRAM(s) Oral every 6 hours PRN Mild Pain (1 - 3)      Allergies    No Known Allergies    Intolerances        REVIEW OF SYSTEMS:  CONSTITUTIONAL: No fever, weight loss, or fatigue  EYES: No eye pain, visual disturbances, or discharge  ENMT:  No difficulty hearing, tinnitus, vertigo; No sinus or throat pain  NECK: No pain or stiffness  BREASTS: No pain, masses, or nipple discharge  RESPIRATORY: No cough, wheezing, chills or hemoptysis; No shortness of breath  CARDIOVASCULAR: No chest pain, palpitations, dizziness, or leg swelling  GASTROINTESTINAL: No abdominal or epigastric pain. No nausea, vomiting, or hematemesis; No diarrhea or constipation. No melena or hematochezia.  GENITOURINARY: No dysuria, frequency, hematuria, or incontinence  NEUROLOGICAL: No headaches, memory loss, loss of strength, numbness, or tremors  SKIN: No itching, burning, rashes, or lesions   LYMPH NODES: No enlarged glands  ENDOCRINE: No heat or cold intolerance; No hair loss  MUSCULOSKELETAL: No joint pain or swelling; No muscle, back, or extremity pain  PSYCHIATRIC: No depression, anxiety, mood swings, or difficulty sleeping  HEME/LYMPH: No easy bruising, or bleeding gums  ALLERGY AND IMMUNOLOGIC: No hives or eczema    Vital Signs Last 24 Hrs  T(C): 36.7 (19 Jun 2018 07:13), Max: 37 (18 Jun 2018 18:00)  T(F): 98 (19 Jun 2018 07:13), Max: 98.6 (18 Jun 2018 18:00)  HR: 75 (19 Jun 2018 07:13) (75 - 92)  BP: 144/83 (19 Jun 2018 07:13) (103/66 - 144/85)  BP(mean): --  RR: 17 (19 Jun 2018 07:13) (16 - 17)  SpO2: 98% (19 Jun 2018 07:13) (96% - 100%)    PHYSICAL EXAM:  GENERAL: NAD, well-groomed, well-developed  HEAD:  Atraumatic, Normocephalic  EYES: EOMI, PERRLA, conjunctiva and sclera clear  ENMT: Moist mucous membranes,   NECK: Supple, No JVD, Normal thyroid  NERVOUS SYSTEM:  Alert & Oriented X3, Good concentration; Motor Strength 5/5 B/L upper and lower extremities; DTRs 2+ intact and symmetric  CHEST/LUNG: Clear to percussion bilaterally; No rales, rhonchi, wheezing, or rubs  HEART: Regular rate and rhythm; No murmurs, rubs, or gallops  ABDOMEN: Soft, Nontender, Nondistended; Bowel sounds present  EXTREMITIES:  2+ Peripheral Pulses, No clubbing, cyanosis, or edema  LYMPH: No lymphadenopathy noted  SKIN: No rashes or lesions    LABS:                        12.8   8.6   )-----------( 252      ( 19 Jun 2018 07:22 )             39.4     06-19    138  |  104  |  11  ----------------------------<  112<H>  4.3   |  29  |  0.67    Ca    10.0      19 Jun 2018 07:22    TPro  7.8  /  Alb  3.7  /  TBili  0.4  /  DBili  x   /  AST  21  /  ALT  26  /  AlkPhos  76  06-18    PT/INR - ( 18 Jun 2018 12:55 )   PT: 11.4 sec;   INR: 1.04 ratio         PTT - ( 18 Jun 2018 12:55 )  PTT:33.1 sec    CAPILLARY BLOOD GLUCOSE      POCT Blood Glucose.: 108 mg/dL (18 Jun 2018 12:08)          I&O's Summary  < from: MR Head No Cont (06.19.18 @ 09:09) >  Multiplanar multisequential MRI of the brain was acquired   without the administration ofIV gadolinium.    COMPARISON: Prior CT examination of the head from 6/18/2018. Prior CT   angiogram examinations of the head and neck from 6/18/2018.    FINDINGS: The brain parenchyma is normal in signal and morphology. There   is no evidence of acute ischemia on the diffusion-weighted images.    Ventricular size and configuration is unremarkable. No abnormal extra   axial fluid collections are noted. Flow-voids are noted throughout the   major intracranial vessels, on the T2 weighted images, consistent with   their patency. The sella turcica and posterior fossa are unremarkable.    The paranasal sinuses and mastoid air cells are clear. Calvarial signal   is within normal limits. The orbits appear unremarkable.    IMPRESSION: No acute intracranial hemorrhage or evidence of acute   ischemia.     < end of copied text >      RADIOLOGY & ADDITIONAL TESTS:    Imaging Personally Reviewed:  [x ] YES  [ ] NO    Consultant(s) Notes Reviewed:  [x ] YES  [ ] NO    Care Discussed with Consultants/Other Providers [x ] YES  [ ] NO

## 2018-06-19 NOTE — DISCHARGE NOTE ADULT - CARE PLAN
Principal Discharge DX:	Transient cerebral ischemia, unspecified type  Goal:	avoid recurrence  Assessment and plan of treatment:	aspirin statin  Secondary Diagnosis:	Dyslipidemia  Assessment and plan of treatment:	statin  Secondary Diagnosis:	Obesity (BMI 30-39.9)  Assessment and plan of treatment:	nutrition and exercise discussed  Secondary Diagnosis:	H/O partial thyroidectomy  Assessment and plan of treatment:	tft normal

## 2018-06-19 NOTE — DISCHARGE NOTE ADULT - CARE PROVIDER_API CALL
Luiza James), Neurology Medicine  [address] [place] [address]  Phone: [phone]  Fax: [phone]

## 2018-06-19 NOTE — PROGRESS NOTE ADULT - ASSESSMENT
PROBLEM Dx:  TIA  HYPERLIPIDEMIA  OBESITY    DISCUSSION:    54 y/o F pt with hx HLD and partial thyroidectomy presents to the ED c/o numbness and tingling sensation in the left arm and left side of face today at 10:55am at work. Pt was typing when she ad a sudden onset of numbness and tingling and momentary loss of strength in the left arm, lasting approximately a couple seconds. Pt has taken a Alcides aspirin this morning before onset of sx. Also relays that she felt a headache in the posterior aspect of the head yesterday, but did not have a headache today. Sx have begun to resolve on their own. Denies chest pain, nausea, vomiting, fever, headache, difficulty ambulating, slurring of speech, LOC, or confusion. No other complaints    ·  Problem: Transient cerebral ischemia, unspecified type.   aspirin, statin,   normal echo , no evidence significant vascular disease on MRA  no evidence of arrhythmia , no evidence of stroke on MRI       ·  Problem: Dyslipidemia.     statin, lipid profile  advised the patient to follow up in the office ,  follow up LFTS lipid profile ,  low salt diet

## 2018-06-19 NOTE — PROGRESS NOTE ADULT - PROBLEM SELECTOR PLAN 1
aspirin, statin, cardiac monitoring, cardio and neuro f up, had MRI normal,    stable for discharghe

## 2018-06-19 NOTE — PROGRESS NOTE ADULT - ASSESSMENT
54 y/o F pt with hx HLD and partial thyroidectomy presents to the ED c/o numbness and tingling sensation in the left arm and left side of face today at 10:55am at work. Pt was typing when she had a sudden onset of numbness and tingling and momentary loss of strength in the left arm, lasting approximately a couple seconds. Pt has taken a Alcides aspirin this morning before onset of sx. Also relays that she felt a headache in the posterior aspect of the head yesterday, but did not have a headache today. Sx have begun to resolve on their own. Denies chest pain, nausea, vomiting, fever, headache, difficulty ambulating, slurring of speech, LOC, or confusion. No other complaints at this time.                      	  admitted with TIA , CVA ruled out, has h/o dyslipidemia and advised to continue statin and aspirin and f up with neuro out pt  echo normal, stable for DC home

## 2018-06-19 NOTE — PROGRESS NOTE ADULT - SUBJECTIVE AND OBJECTIVE BOX
Neurology follow up note    CLARENCE [id number]      Interval History:    Patient feels ok no new complaints.    MEDICATIONS    acetaminophen   Tablet. 650 milliGRAM(s) Oral every 6 hours PRN  aspirin 325 milliGRAM(s) Oral daily  enoxaparin Injectable 40 milliGRAM(s) SubCutaneous daily  simvastatin 5 milliGRAM(s) Oral at bedtime  sodium chloride 0.9% lock flush 3 milliLiter(s) IV Push every 8 hours      Allergies    No Known Allergies    Intolerances        Height (cm): 160.02 (06-18 @ 12:13)  Weight (kg): 95.3 (06-18 @ 12:13)  BMI (kg/m2): 37.2 (06-18 @ 12:13)    Vital Signs Last 24 Hrs  T(C): 36.7 (19 Jun 2018 07:13), Max: 37 (18 Jun 2018 18:00)  T(F): 98 (19 Jun 2018 07:13), Max: 98.6 (18 Jun 2018 18:00)  HR: 75 (19 Jun 2018 07:13) (75 - 92)  BP: 144/83 (19 Jun 2018 07:13) (103/66 - 144/85)  BP(mean): --  RR: 17 (19 Jun 2018 07:13) (16 - 17)  SpO2: 98% (19 Jun 2018 07:13) (96% - 100%)      REVIEW OF SYSTEMS:     Constitutional: No fever, chills, fatigue, weakness  Eyes: no eye pain, visual disturbances, or discharge  ENT:  No difficulty hearing, tinnitus, vertigo; No sinus or throat pain  Neck: No pain or stiffness  Respiratory: No cough, dyspnea, wheezing   Cardiovascular: No chest pain, palpitations,   Gastrointestinal: No abdominal or epigastric pain. No nausea, vomiting  No diarrhea or constipation.   Genitourinary: No dysuria, frequency, hematuria or incontinence  Neurological: No headaches, lightheadedness, vertigo, numbness or tremors  Psychiatric: No depression, anxiety, mood swings or difficulty sleeping  Musculoskeletal: No joint pain or swelling; No muscle, back or extremity pain  Skin: No itching, burning, rashes or lesions   Lymph Nodes: No enlarged glands  Endocrine: No heat or cold intolerance; No hair loss   Allergy and Immunologic: No hives or eczema    On Neurological Examination:     The patient is awake, alert, and oriented x3.      Extraocular movements were intact.  Pupils were equal, round, and reactive bilaterally, 3 mm to 2 mm.  Speech was fluent.      Smile was symmetric.      Motor:  Bilateral upper and lower were 5/5.      Sensory:  Bilateral upper and lower appeared intact to light touch     No drift.  Finger-to-nose within normal limits.    Follow simple commands  Follow complex commands    GENERAL Exam: Nontoxic , No Acute Distress   	  HEENT:  normocephalic, atraumatic  		  LUNGS: Clear bilaterally   	  HEART: Normal S1S2   No murmur RRR        	  GI/ ABDOMEN:  Soft  Non tender    EXTREMITIES:   No Edema  No Clubbing  No Cyanosis No Edema    MUSCULOSKELETAL: Normal Range of Motion   	   SKIN: Normal  No Ecchymosis               LABS:  CBC Full  -  ( 18 Jun 2018 12:55 )  WBC Count : 10.8 K/uL  Hemoglobin : 12.7 g/dL  Hematocrit : 38.2 %  Platelet Count - Automated : 248 K/uL  Mean Cell Volume : 80.8 fl  Mean Cell Hemoglobin : 26.8 pg  Mean Cell Hemoglobin Concentration : 33.2 gm/dL  Auto Neutrophil # : 6.8 K/uL  Auto Lymphocyte # : 2.7 K/uL  Auto Monocyte # : 0.7 K/uL  Auto Eosinophil # : 0.5 K/uL  Auto Basophil # : 0.1 K/uL  Auto Neutrophil % : 63.2 %  Auto Lymphocyte % : 24.6 %  Auto Monocyte % : 06.3 %  Auto Eosinophil % : 4.9 %  Auto Basophil % : 1.0 %      06-19    138  |  104  |  11  ----------------------------<  112<H>  4.3   |  29  |  0.67    Ca    10.0      19 Jun 2018 07:22    TPro  7.8  /  Alb  3.7  /  TBili  0.4  /  DBili  x   /  AST  21  /  ALT  26  /  AlkPhos  76  06-18    Hemoglobin A1C:     LIVER FUNCTIONS - ( 18 Jun 2018 12:55 )  Alb: 3.7 g/dL / Pro: 7.8 g/dL / ALK PHOS: 76 U/L / ALT: 26 U/L DA / AST: 21 U/L / GGT: x           Vitamin B12   PT/INR - ( 18 Jun 2018 12:55 )   PT: 11.4 sec;   INR: 1.04 ratio         PTT - ( 18 Jun 2018 12:55 )  PTT:33.1 sec      RADIOLOGY      ANALYSIS AND PLAN:  A 53-year-old with an episode of left-sided paresthesia and left-sided hemiparesis.  1.	Clinical impression is TIA, rule out cerebrovascular accident.  2.	I will start the patient on aspirin 325 once a day.  3.	If MRI of the brain is normal   4.	I would recommend an echocardiogram.  5.	Continue the patient on cholesterol medications.  6.	Telemetry evaluation to rule out underlying arrhythmia.    Physical therapy evaluation as tolerated  OOB to chair/ambulation with assistance only if possible.    Greater than 45 minutes spent in direct patient care reviewing  the notes, lab data/ imaging , discussion with multidisciplinary team.

## 2018-06-19 NOTE — DISCHARGE NOTE ADULT - MEDICATION SUMMARY - MEDICATIONS TO TAKE
I will START or STAY ON the medications listed below when I get home from the hospital:    aspirin 325 mg oral tablet  -- 1 tab(s) by mouth once a day  -- Indication: For TIA    simvastatin 5 mg oral tablet  -- 1 tab(s) by mouth once a day (at bedtime)  -- Indication: For TIA

## 2018-06-19 NOTE — PROGRESS NOTE ADULT - PROBLEM/PLAN-4
On-body Injector for Neulasta Patient Instructions       Your On-Body Injection device was applied on this day:  6/7/18 at this time 3:30    Your dose of medication will start on this day: 6/8/18 at this time 5:3 DISPLAY PLAN FREE TEXT

## 2018-06-19 NOTE — DISCHARGE NOTE ADULT - NS DC ANGIO PCI YN
Patient was instructed how to use Aerobika for mobilizing secretions. She did fairly well. RT will continue to follow.    no

## 2018-06-19 NOTE — PROGRESS NOTE ADULT - SUBJECTIVE AND OBJECTIVE BOX
REASON FOR CONSULT:    CHIEF COMPLAINT: left sided numbness     HPI:  	  54 y/o F pt with hx HLD and partial thyroidectomy presents to the ED c/o numbness and tingling sensation in the left arm and left side of face today at 10:55am at work. Pt was typing when she had a sudden onset of numbness and tingling and momentary loss of strength in the left arm, lasting approximately a couple seconds. Pt has taken a Alcides aspirin this morning before onset of sx. Also relays that she felt a headache in the posterior aspect of the head yesterday, but did not have a headache today. Sx have begun to resolve on their own. Denies chest pain, nausea, vomiting, fever, headache, difficulty ambulating, slurring of speech, LOC, or confusion. No other complaints at this time.                      	      6/19/18 Patient is feeling well , denies any chest pain or weakness , lipid profile pending , hx of hyperlipidemia , MRI MRA negative for stroke ,   blood pressure controlled     PAST MEDICAL & SURGICAL HISTORY:  Dyslipidemia  H/O: hysterectomy  H/O partial thyroidectomy: 1997      MEDICATIONS  (STANDING):  aspirin 325 milliGRAM(s) Oral daily  enoxaparin Injectable 40 milliGRAM(s) SubCutaneous daily  simvastatin 5 milliGRAM(s) Oral at bedtime  sodium chloride 0.9% lock flush 3 milliLiter(s) IV Push every 8 hours    MEDICATIONS  (PRN):  acetaminophen   Tablet. 650 milliGRAM(s) Oral every 6 hours PRN Mild Pain (1 - 3)  - 3)      REVIEW OF SYSTEMS:    CONSTITUTIONAL: No weakness, fevers or chills  EYES/ENT: No visual changes;  No vertigo or throat pain   NECK: No pain or stiffness  RESPIRATORY: No cough, wheezing, hemoptysis; No shortness of breath  CARDIOVASCULAR: No chest pain or palpitations  GASTROINTESTINAL: No abdominal or epigastric pain. No nausea, vomiting, or hematemesis; No diarrhea or constipation. No melena or hematochezia.  GENITOURINARY: No dysuria, frequency or hematuria  NEUROLOGICAL: No numbness or weakness  SKIN: No itching, burning, rashes, or lesions   All other review of systems is negative unless indicated above    Vital Signs Last 24 Hrs  T(C): 36.7 (19 Jun 2018 07:13), Max: 37 (18 Jun 2018 18:00)  T(F): 98 (19 Jun 2018 07:13), Max: 98.6 (18 Jun 2018 18:00)  HR: 75 (19 Jun 2018 07:13) (75 - 92)  BP: 144/83 (19 Jun 2018 07:13) (103/66 - 144/85)  BP(mean): --  RR: 17 (19 Jun 2018 07:13) (16 - 17)  SpO2: 98% (19 Jun 2018 07:13) (96% - 100%)    I&O's Summary      PHYSICAL EXAM:    Constitutional: NAD, awake and alert, well-developed  HEENT: PERR, EOMI,  No oral cyananosis.  Neck:  supple,  No JVD  Respiratory: Breath sounds are clear bilaterally, No wheezing, rales or rhonchi  Cardiovascular: S1 and S2, regular rate and rhythm, no Murmurs, gallops or rubs  Gastrointestinal: Bowel Sounds present, soft, nontender.   Extremities: No peripheral edema. No clubbing or cyanosis.  Vascular: 2+ peripheral pulses  Neurological: A/O x 3, no focal deficits  Musculoskeletal: no calf tenderness.  Skin: No rashes.      LABS: All Labs Reviewed:                          12.8   8.6   )-----------( 252      ( 19 Jun 2018 07:22 )             39.4     06-19    138  |  104  |  11  ----------------------------<  112<H>  4.3   |  29  |  0.67    Ca    10.0      19 Jun 2018 07:22    TPro  7.8  /  Alb  3.7  /  TBili  0.4  /  DBili  x   /  AST  21  /  ALT  26  /  AlkPhos  76  06-18    CARDIAC MARKERS ( 18 Jun 2018 12:55 )  .000 ng/mL / x     / 115 U/L / x     / 0.9 ng/mL      LIVER FUNCTIONS - ( 18 Jun 2018 12:55 )  Alb: 3.7 g/dL / Pro: 7.8 g/dL / ALK PHOS: 76 U/L / ALT: 26 U/L DA / AST: 21 U/L / GGT: x           PT/INR - ( 18 Jun 2018 12:55 )   PT: 11.4 sec;   INR: 1.04 ratio         PTT - ( 18 Jun 2018 12:55 )  PTT:33.1 sec       	ECHO,CAROTID DOPPLER ORDERED-pending    RADIOLOGY/EKG:  NSR,NON SPECIFIC ST-T CHANGES  LVH    < from: US Transthoracic Echocardiogram w/Doppler Complete (06.18.18 @ 17:17) >  SUMMARY:  1. normal right and left ventricular systolic function  2. trace mitral regurgitation  3. trace tricuspid regurgitation with normal right ventricular systolic   pressure.    < end of copied text >      Monitor sinus rhythm

## 2018-06-19 NOTE — DISCHARGE NOTE ADULT - HOSPITAL COURSE
52 y/o F pt with hx HLD and partial thyroidectomy presents to the ED c/o numbness and tingling sensation in the left arm and left side of face today at 10:55am at work. Pt was typing when she had a sudden onset of numbness and tingling and momentary loss of strength in the left arm, lasting approximately a couple seconds. Pt has taken a Alcides aspirin this morning before onset of sx. Also relays that she felt a headache in the posterior aspect of the head yesterday, but did not have a headache today. Sx have begun to resolve on their own. Denies chest pain, nausea, vomiting, fever, headache, difficulty ambulating, slurring of speech, LOC, or confusion. No other complaints at this time.                      	  admitted with TIA , CVA ruled out, has h/o dyslipidemia and advised to continue statin and aspirin and f up with neuro out pt  echo normal,obesity with BMI 32,nutrition exercise discussed, stable for DC home

## 2018-06-28 ENCOUNTER — APPOINTMENT (OUTPATIENT)
Dept: DERMATOLOGY | Facility: CLINIC | Age: 53
End: 2018-06-28
Payer: COMMERCIAL

## 2018-06-28 PROCEDURE — 10060 I&D ABSCESS SIMPLE/SINGLE: CPT

## 2018-06-28 PROCEDURE — 99203 OFFICE O/P NEW LOW 30 MIN: CPT | Mod: 25

## 2018-10-27 ENCOUNTER — OUTPATIENT (OUTPATIENT)
Dept: OUTPATIENT SERVICES | Facility: HOSPITAL | Age: 53
LOS: 1 days | End: 2018-10-27
Payer: COMMERCIAL

## 2018-10-27 ENCOUNTER — APPOINTMENT (OUTPATIENT)
Dept: MAMMOGRAPHY | Facility: CLINIC | Age: 53
End: 2018-10-27
Payer: COMMERCIAL

## 2018-10-27 DIAGNOSIS — Z00.8 ENCOUNTER FOR OTHER GENERAL EXAMINATION: ICD-10-CM

## 2018-10-27 DIAGNOSIS — Z98.89 OTHER SPECIFIED POSTPROCEDURAL STATES: Chronic | ICD-10-CM

## 2018-10-27 DIAGNOSIS — Z90.710 ACQUIRED ABSENCE OF BOTH CERVIX AND UTERUS: Chronic | ICD-10-CM

## 2018-10-27 PROCEDURE — 77067 SCR MAMMO BI INCL CAD: CPT | Mod: 26

## 2018-10-27 PROCEDURE — 77063 BREAST TOMOSYNTHESIS BI: CPT | Mod: 26

## 2018-10-27 PROCEDURE — 77067 SCR MAMMO BI INCL CAD: CPT

## 2018-10-27 PROCEDURE — 77063 BREAST TOMOSYNTHESIS BI: CPT

## 2018-12-13 ENCOUNTER — TRANSCRIPTION ENCOUNTER (OUTPATIENT)
Age: 53
End: 2018-12-13

## 2019-04-09 ENCOUNTER — NON-APPOINTMENT (OUTPATIENT)
Age: 54
End: 2019-04-09

## 2019-04-09 ENCOUNTER — APPOINTMENT (OUTPATIENT)
Dept: FAMILY MEDICINE | Facility: CLINIC | Age: 54
End: 2019-04-09
Payer: COMMERCIAL

## 2019-04-09 VITALS
HEIGHT: 63 IN | OXYGEN SATURATION: 98 % | RESPIRATION RATE: 16 BRPM | DIASTOLIC BLOOD PRESSURE: 70 MMHG | SYSTOLIC BLOOD PRESSURE: 128 MMHG | BODY MASS INDEX: 38.62 KG/M2 | HEART RATE: 89 BPM | TEMPERATURE: 98.3 F | WEIGHT: 218 LBS

## 2019-04-09 DIAGNOSIS — Z87.39 PERSONAL HISTORY OF OTHER DISEASES OF THE MUSCULOSKELETAL SYSTEM AND CONNECTIVE TISSUE: ICD-10-CM

## 2019-04-09 DIAGNOSIS — Z87.898 PERSONAL HISTORY OF OTHER SPECIFIED CONDITIONS: ICD-10-CM

## 2019-04-09 DIAGNOSIS — R42 DIZZINESS AND GIDDINESS: ICD-10-CM

## 2019-04-09 DIAGNOSIS — Z11.4 ENCOUNTER FOR SCREENING FOR HUMAN IMMUNODEFICIENCY VIRUS [HIV]: ICD-10-CM

## 2019-04-09 LAB — CYTOLOGY CVX/VAG DOC THIN PREP: NORMAL

## 2019-04-09 PROCEDURE — 99396 PREV VISIT EST AGE 40-64: CPT | Mod: 25

## 2019-04-09 PROCEDURE — G0444 DEPRESSION SCREEN ANNUAL: CPT

## 2019-04-09 PROCEDURE — 93000 ELECTROCARDIOGRAM COMPLETE: CPT

## 2019-04-09 RX ORDER — ERGOCALCIFEROL 1.25 MG/1
1.25 MG CAPSULE, LIQUID FILLED ORAL
Qty: 12 | Refills: 4 | Status: COMPLETED | COMMUNITY
Start: 2017-09-12 | End: 2019-04-09

## 2019-04-09 RX ORDER — CLOTRIMAZOLE AND BETAMETHASONE DIPROPIONATE 10; .5 MG/G; MG/G
1-0.05 CREAM TOPICAL TWICE DAILY
Qty: 1 | Refills: 0 | Status: COMPLETED | COMMUNITY
Start: 2018-05-04 | End: 2019-04-09

## 2019-04-09 RX ORDER — METHYLPREDNISOLONE 4 MG/1
4 TABLET ORAL
Qty: 21 | Refills: 0 | Status: DISCONTINUED | COMMUNITY
Start: 2018-05-04 | End: 2019-04-09

## 2019-04-09 NOTE — HISTORY OF PRESENT ILLNESS
[FreeTextEntry1] : cpe [de-identified] : Patient is here today for a physical. \par Overall has been doing well. \par Does mention having dizziness for the past 2 months on occasion.  She states it happens mostly when she moves suddenly.  Usually short lived.  She has not been taking anything for her symptoms. \par She also mentions having a cough for the past few months.  No fevers, shortness of breath.  Has not taken anything for it.

## 2019-04-09 NOTE — ASSESSMENT
[FreeTextEntry1] : Health maintenance\par - comprehensive labs\par - EKG - normal sinus rhythm \par - up to date with flu shot\par - will consider tdap at next visit\par - up to date with pap and mammo\par - agrees to FIT testing for colon cancer screening \par - negative depression screening \par \par Dizziness\par - possible vertigo, worse with movement\par - trial of meclizine\par - EKG done today and normal \par - check labs\par \par Cough \par - persistent for months\par - check chest xray\par - likely allergy related \par - trial of singulair / antihistamine

## 2019-04-09 NOTE — HEALTH RISK ASSESSMENT
[HIV Test offered] : HIV Test offered [None] : None [With Family] : lives with family [Sexually Active] : sexually active [Employed] : employed [] :  [Fully functional (bathing, dressing, toileting, transferring, walking, feeding)] : Fully functional (bathing, dressing, toileting, transferring, walking, feeding) [Fully functional (using the telephone, shopping, preparing meals, housekeeping, doing laundry, using] : Fully functional and needs no help or supervision to perform IADLs (using the telephone, shopping, preparing meals, housekeeping, doing laundry, using transportation, managing medications and managing finances) [Smoke Detector] : smoke detector [Carbon Monoxide Detector] : carbon monoxide detector [With Patient/Caregiver] : With Patient/Caregiver [Seat Belt] :  uses seat belt [Name: ___] : Health Care Proxy's Name: [unfilled]  [Relationship: ___] : Relationship: [unfilled] [Good] : ~his/her~  mood as  good [No falls in past year] : Patient reported no falls in the past year [0] : 2) Feeling down, depressed, or hopeless: Not at all (0) [Patient reported mammogram was normal] : Patient reported mammogram was normal [Patient reported PAP Smear was normal] : Patient reported PAP Smear was normal [Hepatitis C test offered] : Hepatitis C test offered [] : No [de-identified] : occasional  [[id number]] : 0 [Change in mental status noted] : No change in mental status noted [Language] : denies difficulty with language [Behavior] : denies difficulty with behavior [Reasoning] : denies difficulty with reasoning [Reports changes in hearing] : Reports no changes in hearing [Reports changes in vision] : Reports no changes in vision [Reports changes in dental health] : Reports no changes in dental health [MammogramDate] : 09/2018 [PapSmearDate] : 09/2018 [ColonoscopyComments] : agrees to FIT testing [AdvancecareDate] : 04/19

## 2019-04-11 ENCOUNTER — LABORATORY RESULT (OUTPATIENT)
Age: 54
End: 2019-04-11

## 2019-04-16 LAB
25(OH)D3 SERPL-MCNC: 19.1 NG/ML
ALBUMIN SERPL ELPH-MCNC: 4.3 G/DL
ALP BLD-CCNC: 66 U/L
ALT SERPL-CCNC: 16 U/L
ANION GAP SERPL CALC-SCNC: 14 MMOL/L
APPEARANCE: CLEAR
AST SERPL-CCNC: 13 U/L
BASOPHILS # BLD AUTO: 0.09 K/UL
BASOPHILS NFR BLD AUTO: 0.8 %
BILIRUB SERPL-MCNC: 0.3 MG/DL
BILIRUBIN URINE: NEGATIVE
BLOOD URINE: NEGATIVE
BUN SERPL-MCNC: 12 MG/DL
CALCIUM SERPL-MCNC: 10.1 MG/DL
CHLORIDE SERPL-SCNC: 104 MMOL/L
CHOLEST SERPL-MCNC: 216 MG/DL
CHOLEST/HDLC SERPL: 4.2 RATIO
CO2 SERPL-SCNC: 21 MMOL/L
COLOR: COLORLESS
CREAT SERPL-MCNC: 0.55 MG/DL
EOSINOPHIL # BLD AUTO: 0.92 K/UL
EOSINOPHIL NFR BLD AUTO: 8.1 %
ESTIMATED AVERAGE GLUCOSE: 114 MG/DL
GLUCOSE QUALITATIVE U: NEGATIVE
GLUCOSE SERPL-MCNC: 114 MG/DL
HBA1C MFR BLD HPLC: 5.6 %
HCT VFR BLD CALC: 41.2 %
HCV AB SER QL: NONREACTIVE
HCV S/CO RATIO: 0.12 S/CO
HDLC SERPL-MCNC: 51 MG/DL
HGB BLD-MCNC: 12.7 G/DL
HIV1+2 AB SPEC QL IA.RAPID: NONREACTIVE
IMM GRANULOCYTES NFR BLD AUTO: 0.7 %
IRON SATN MFR SERPL: 15 %
IRON SERPL-MCNC: 56 UG/DL
KETONES URINE: NEGATIVE
LDLC SERPL CALC-MCNC: 132 MG/DL
LEUKOCYTE ESTERASE URINE: ABNORMAL
LYMPHOCYTES # BLD AUTO: 3.36 K/UL
LYMPHOCYTES NFR BLD AUTO: 29.5 %
MAN DIFF?: NORMAL
MCHC RBC-ENTMCNC: 25.8 PG
MCHC RBC-ENTMCNC: 30.8 GM/DL
MCV RBC AUTO: 83.7 FL
MONOCYTES # BLD AUTO: 0.87 K/UL
MONOCYTES NFR BLD AUTO: 7.6 %
NEUTROPHILS # BLD AUTO: 6.08 K/UL
NEUTROPHILS NFR BLD AUTO: 53.3 %
NITRITE URINE: NEGATIVE
PH URINE: 6
PLATELET # BLD AUTO: 262 K/UL
POTASSIUM SERPL-SCNC: 4.6 MMOL/L
PROT SERPL-MCNC: 7 G/DL
PROTEIN URINE: NEGATIVE
RBC # BLD: 4.92 M/UL
RBC # FLD: 14.6 %
SODIUM SERPL-SCNC: 139 MMOL/L
SPECIFIC GRAVITY URINE: 1.01
T4 FREE SERPL-MCNC: 1 NG/DL
TIBC SERPL-MCNC: 380 UG/DL
TRIGL SERPL-MCNC: 167 MG/DL
TSH SERPL-ACNC: 1.87 UIU/ML
UIBC SERPL-MCNC: 324 UG/DL
UROBILINOGEN URINE: NORMAL
VIT B12 SERPL-MCNC: 746 PG/ML
WBC # FLD AUTO: 11.4 K/UL

## 2019-05-01 ENCOUNTER — RX RENEWAL (OUTPATIENT)
Age: 54
End: 2019-05-01

## 2019-05-13 NOTE — H&P ADULT - PROBLEM/PLAN-1
Bill For Individual Tests Below?: no
Number Of Tubes Drawn: 1
Venipuncture Paragraph: An alcohol pad was applied to the venipuncture site. Venipuncture was performed using a straight needle. Pressure and a bandaid was applied to the site. No complications were noted.
Detail Level: None
DISPLAY PLAN FREE TEXT

## 2019-06-05 ENCOUNTER — FORM ENCOUNTER (OUTPATIENT)
Age: 54
End: 2019-06-05

## 2019-06-05 DIAGNOSIS — R91.8 OTHER NONSPECIFIC ABNORMAL FINDING OF LUNG FIELD: ICD-10-CM

## 2019-06-06 ENCOUNTER — OUTPATIENT (OUTPATIENT)
Dept: OUTPATIENT SERVICES | Facility: HOSPITAL | Age: 54
LOS: 1 days | End: 2019-06-06
Payer: COMMERCIAL

## 2019-06-06 ENCOUNTER — APPOINTMENT (OUTPATIENT)
Dept: RADIOLOGY | Facility: CLINIC | Age: 54
End: 2019-06-06
Payer: COMMERCIAL

## 2019-06-06 DIAGNOSIS — Z98.89 OTHER SPECIFIED POSTPROCEDURAL STATES: Chronic | ICD-10-CM

## 2019-06-06 DIAGNOSIS — Z90.710 ACQUIRED ABSENCE OF BOTH CERVIX AND UTERUS: Chronic | ICD-10-CM

## 2019-06-06 DIAGNOSIS — R05 COUGH: ICD-10-CM

## 2019-06-06 PROCEDURE — 71046 X-RAY EXAM CHEST 2 VIEWS: CPT | Mod: 26

## 2019-06-06 PROCEDURE — 71046 X-RAY EXAM CHEST 2 VIEWS: CPT

## 2019-06-07 PROBLEM — R91.8 OPACITY OF LUNG ON IMAGING STUDY: Status: ACTIVE | Noted: 2019-06-07

## 2019-06-12 ENCOUNTER — APPOINTMENT (OUTPATIENT)
Dept: CT IMAGING | Facility: CLINIC | Age: 54
End: 2019-06-12

## 2019-06-14 ENCOUNTER — FORM ENCOUNTER (OUTPATIENT)
Age: 54
End: 2019-06-14

## 2019-06-15 ENCOUNTER — OUTPATIENT (OUTPATIENT)
Dept: OUTPATIENT SERVICES | Facility: HOSPITAL | Age: 54
LOS: 1 days | End: 2019-06-15
Payer: COMMERCIAL

## 2019-06-15 ENCOUNTER — APPOINTMENT (OUTPATIENT)
Dept: CT IMAGING | Facility: CLINIC | Age: 54
End: 2019-06-15
Payer: COMMERCIAL

## 2019-06-15 DIAGNOSIS — Z98.89 OTHER SPECIFIED POSTPROCEDURAL STATES: Chronic | ICD-10-CM

## 2019-06-15 DIAGNOSIS — Z00.8 ENCOUNTER FOR OTHER GENERAL EXAMINATION: ICD-10-CM

## 2019-06-15 DIAGNOSIS — Z90.710 ACQUIRED ABSENCE OF BOTH CERVIX AND UTERUS: Chronic | ICD-10-CM

## 2019-06-15 DIAGNOSIS — R91.8 OTHER NONSPECIFIC ABNORMAL FINDING OF LUNG FIELD: ICD-10-CM

## 2019-06-15 PROCEDURE — 71250 CT THORAX DX C-: CPT

## 2019-06-15 PROCEDURE — 71250 CT THORAX DX C-: CPT | Mod: 26

## 2019-06-17 LAB — HEMOCCULT STL QL IA: NEGATIVE

## 2019-09-10 ENCOUNTER — TRANSCRIPTION ENCOUNTER (OUTPATIENT)
Age: 54
End: 2019-09-10

## 2020-01-28 ENCOUNTER — APPOINTMENT (OUTPATIENT)
Dept: FAMILY MEDICINE | Facility: CLINIC | Age: 55
End: 2020-01-28
Payer: COMMERCIAL

## 2020-01-28 VITALS
TEMPERATURE: 101 F | DIASTOLIC BLOOD PRESSURE: 80 MMHG | OXYGEN SATURATION: 98 % | WEIGHT: 206 LBS | HEART RATE: 88 BPM | HEIGHT: 63 IN | RESPIRATION RATE: 16 BRPM | SYSTOLIC BLOOD PRESSURE: 150 MMHG | BODY MASS INDEX: 36.5 KG/M2

## 2020-01-28 DIAGNOSIS — R05 COUGH: ICD-10-CM

## 2020-01-28 DIAGNOSIS — J22 UNSPECIFIED ACUTE LOWER RESPIRATORY INFECTION: ICD-10-CM

## 2020-01-28 DIAGNOSIS — R06.2 WHEEZING: ICD-10-CM

## 2020-01-28 DIAGNOSIS — R50.9 FEVER, UNSPECIFIED: ICD-10-CM

## 2020-01-28 PROCEDURE — 99214 OFFICE O/P EST MOD 30 MIN: CPT | Mod: 25

## 2020-01-28 PROCEDURE — 87804 INFLUENZA ASSAY W/OPTIC: CPT | Mod: QW

## 2020-01-28 RX ORDER — MONTELUKAST 10 MG/1
10 TABLET, FILM COATED ORAL
Qty: 90 | Refills: 0 | Status: COMPLETED | COMMUNITY
Start: 2019-04-09 | End: 2020-01-28

## 2020-01-28 NOTE — REVIEW OF SYSTEMS
[Fever] : fever [Chills] : chills [Sore Throat] : sore throat [Postnasal Drip] : postnasal drip [Shortness Of Breath] : shortness of breath [Wheezing] : wheezing [Cough] : cough [Headache] : headache [Negative] : Integumentary

## 2020-01-28 NOTE — ASSESSMENT
[FreeTextEntry1] : Fever \par LRTI\par Wheezing \par Cough\par - rapid flu negative\par - likely viral\par - continue tylenol/ advil for fever\par - mucinex\par - inhaler for SOB and wheeze\par - cough suppressant for symptom relief\par - if not feeling better in 3-5 days start z pack\par - follow up if not better with treatment

## 2020-01-28 NOTE — PHYSICAL EXAM
[No Acute Distress] : no acute distress [Normal Sclera/Conjunctiva] : normal sclera/conjunctiva [Well Nourished] : well nourished [Well Developed] : well developed [PERRL] : pupils equal round and reactive to light [Normal Outer Ear/Nose] : the outer ears and nose were normal in appearance [Supple] : supple [Normal TMs] : both tympanic membranes were normal [No Lymphadenopathy] : no lymphadenopathy [No Respiratory Distress] : no respiratory distress  [No Accessory Muscle Use] : no accessory muscle use [Normal Rate] : normal rate  [Regular Rhythm] : with a regular rhythm [Normal S1, S2] : normal S1 and S2 [Non Tender] : non-tender [Non-distended] : non-distended [Soft] : abdomen soft [Normal Posterior Cervical Nodes] : no posterior cervical lymphadenopathy [Normal Bowel Sounds] : normal bowel sounds [Normal Anterior Cervical Nodes] : no anterior cervical lymphadenopathy [No Focal Deficits] : no focal deficits [No Rash] : no rash [Normal Gait] : normal gait [Normal Insight/Judgement] : insight and judgment were intact [Normal Affect] : the affect was normal [de-identified] : mild wheezing [de-identified] : mild erythema of pharynx

## 2020-01-28 NOTE — HISTORY OF PRESENT ILLNESS
[FreeTextEntry8] : Patient is here today for an acute visit.\par She has been feeling sick for the past 3 days.\par She has been having cough and congestion.\par She has headache. \par Yesterday she had chills and low grade temperature.\par She is bringing up a lot of mucus with the cough, mostly pale yellow. \par Cough is throughout the day.  She feels short of breath and wheezing at times. \par She has been taking mucinex over the counter which helps a little but only for a few hours. \par She is taking tylenol for headache which is not helping. \par Sick contacts at work - positive flu

## 2020-04-25 ENCOUNTER — MESSAGE (OUTPATIENT)
Age: 55
End: 2020-04-25

## 2020-05-13 ENCOUNTER — APPOINTMENT (OUTPATIENT)
Dept: DISASTER EMERGENCY | Facility: CLINIC | Age: 55
End: 2020-05-13

## 2020-05-13 LAB
SARS-COV-2 IGG SERPL IA-ACNC: 0.2 RATIO
SARS-COV-2 IGG SERPL QL IA: NEGATIVE

## 2020-08-06 ENCOUNTER — APPOINTMENT (OUTPATIENT)
Dept: FAMILY MEDICINE | Facility: CLINIC | Age: 55
End: 2020-08-06
Payer: COMMERCIAL

## 2020-08-06 ENCOUNTER — NON-APPOINTMENT (OUTPATIENT)
Age: 55
End: 2020-08-06

## 2020-08-06 VITALS
HEIGHT: 63 IN | SYSTOLIC BLOOD PRESSURE: 136 MMHG | RESPIRATION RATE: 16 BRPM | HEART RATE: 84 BPM | DIASTOLIC BLOOD PRESSURE: 78 MMHG | BODY MASS INDEX: 37.21 KG/M2 | OXYGEN SATURATION: 98 % | TEMPERATURE: 98.6 F | WEIGHT: 210 LBS

## 2020-08-06 DIAGNOSIS — Z13.29 ENCOUNTER FOR SCREENING FOR OTHER SUSPECTED ENDOCRINE DISORDER: ICD-10-CM

## 2020-08-06 DIAGNOSIS — Z13.1 ENCOUNTER FOR SCREENING FOR DIABETES MELLITUS: ICD-10-CM

## 2020-08-06 DIAGNOSIS — Z12.39 ENCOUNTER FOR OTHER SCREENING FOR MALIGNANT NEOPLASM OF BREAST: ICD-10-CM

## 2020-08-06 PROCEDURE — G0442 ANNUAL ALCOHOL SCREEN 15 MIN: CPT | Mod: NC

## 2020-08-06 PROCEDURE — 93000 ELECTROCARDIOGRAM COMPLETE: CPT | Mod: 59

## 2020-08-06 PROCEDURE — 36415 COLL VENOUS BLD VENIPUNCTURE: CPT

## 2020-08-06 PROCEDURE — 99396 PREV VISIT EST AGE 40-64: CPT | Mod: 25

## 2020-08-06 RX ORDER — MECLIZINE HYDROCHLORIDE 25 MG/1
25 TABLET ORAL 3 TIMES DAILY
Qty: 42 | Refills: 0 | Status: DISCONTINUED | COMMUNITY
Start: 2019-04-09 | End: 2020-08-06

## 2020-08-06 RX ORDER — AZITHROMYCIN 250 MG/1
250 TABLET, FILM COATED ORAL
Qty: 1 | Refills: 0 | Status: DISCONTINUED | COMMUNITY
Start: 2020-01-28 | End: 2020-08-06

## 2020-08-06 RX ORDER — BENZONATATE 200 MG/1
200 CAPSULE ORAL 3 TIMES DAILY
Qty: 20 | Refills: 0 | Status: DISCONTINUED | COMMUNITY
Start: 2020-01-28 | End: 2020-08-06

## 2020-08-06 NOTE — PHYSICAL EXAM
[No Acute Distress] : no acute distress [Well Nourished] : well nourished [Well Developed] : well developed [Well-Appearing] : well-appearing [Normal Sclera/Conjunctiva] : normal sclera/conjunctiva [PERRL] : pupils equal round and reactive to light [EOMI] : extraocular movements intact [Normal Outer Ear/Nose] : the outer ears and nose were normal in appearance [Normal Oropharynx] : the oropharynx was normal [Supple] : supple [Normal TMs] : both tympanic membranes were normal [Thyroid Normal, No Nodules] : the thyroid was normal and there were no nodules present [No Lymphadenopathy] : no lymphadenopathy [No Respiratory Distress] : no respiratory distress  [No Accessory Muscle Use] : no accessory muscle use [Clear to Auscultation] : lungs were clear to auscultation bilaterally [Normal Rate] : normal rate  [Regular Rhythm] : with a regular rhythm [Normal S1, S2] : normal S1 and S2 [No Murmur] : no murmur heard [No Edema] : there was no peripheral edema [Pedal Pulses Present] : the pedal pulses are present [Soft] : abdomen soft [Non Tender] : non-tender [Non-distended] : non-distended [No HSM] : no HSM [No Masses] : no abdominal mass palpated [Normal Bowel Sounds] : normal bowel sounds [Normal Posterior Cervical Nodes] : no posterior cervical lymphadenopathy [Normal Anterior Cervical Nodes] : no anterior cervical lymphadenopathy [No Spinal Tenderness] : no spinal tenderness [No CVA Tenderness] : no CVA  tenderness [No Rash] : no rash [No Joint Swelling] : no joint swelling [Grossly Normal Strength/Tone] : grossly normal strength/tone [Coordination Grossly Intact] : coordination grossly intact [Normal Gait] : normal gait [Normal Affect] : the affect was normal [Alert and Oriented x3] : oriented to person, place, and time [No Focal Deficits] : no focal deficits [Normal Insight/Judgement] : insight and judgment were intact

## 2020-08-06 NOTE — COUNSELING
[Potential consequences of obesity discussed] : Potential consequences of obesity discussed [Benefits of weight loss discussed] : Benefits of weight loss discussed [Encouraged to maintain food diary] : Encouraged to maintain food diary [Encouraged to increase physical activity] : Encouraged to increase physical activity [Encouraged to use exercise tracking device] : Encouraged to use exercise tracking device [Weigh Self Weekly] : weigh self weekly [Decrease Portions] : decrease portions [____ min/wk Activity] : [unfilled] min/wk activity [Keep Food Diary] : keep food diary [Good understanding] : Patient has a good understanding of disease, goals and obesity follow-up plan

## 2020-08-09 NOTE — HISTORY OF PRESENT ILLNESS
[FreeTextEntry1] : annual [de-identified] : Ms. CLARENCE FELICIANO is a 55 year old female presenting for an annual\par Overall has been doing well. \par will consider tdap at next visit. Thinks she may have had it 3 years ago. WIll check records\par up to date with pap \par Schedule mammo\par not had colonoscopy

## 2020-08-09 NOTE — ASSESSMENT
[FreeTextEntry1] : Health maintenance\par comprehensive labs\par EKG - normal sinus rhythm \par will consider tdap at next visit. Thinks she may have had it 3 years ago. WIll check records\par up to date with pap \par Schedule mammo\par agrees to FIT testing for colon cancer screening \par negative depression screening \par \par

## 2020-08-09 NOTE — HEALTH RISK ASSESSMENT
[Very Good] : ~his/her~  mood as very good [Monthly or less (1 pt)] : Monthly or less (1 point) [Yes] : Yes [1 or 2 (0 pts)] : 1 or 2 (0 points) [Never (0 pts)] : Never (0 points) [No] : In the past 12 months have you used drugs other than those required for medical reasons? No [0] : 1) Little interest or pleasure doing things: Not at all (0) [Patient declined colonoscopy] : Patient declined colonoscopy [Patient reported mammogram was normal] : Patient reported mammogram was normal [Patient reported PAP Smear was normal] : Patient reported PAP Smear was normal [None] : None [With Family] : lives with family [Employed] : employed [] :  [Sexually Active] : sexually active [Feels Safe at Home] : Feels safe at home [Smoke Detector] : smoke detector [Seat Belt] :  uses seat belt [Carbon Monoxide Detector] : carbon monoxide detector [Sunscreen] : uses sunscreen [With Patient/Caregiver] : With Patient/Caregiver [Name: ___] : Health Care Proxy's Name: [unfilled]  [Designated Healthcare Proxy] : Designated healthcare proxy [Relationship: ___] : Relationship: [unfilled] [] : No [Audit-CScore] : 1 [[id number]] : 0 [Change in mental status noted] : No change in mental status noted [High Risk Behavior] : no high risk behavior [Reports changes in hearing] : Reports no changes in hearing [Reports changes in vision] : Reports no changes in vision [Reports changes in dental health] : Reports no changes in dental health [PapSmearDate] : 2/2020 [MammogramDate] : 9/2018 [TB Exposure] : is not being exposed to tuberculosis [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

## 2020-08-11 LAB
25(OH)D3 SERPL-MCNC: 16.5 NG/ML
ALBUMIN SERPL ELPH-MCNC: 4.9 G/DL
ALP BLD-CCNC: 79 U/L
ALT SERPL-CCNC: 21 U/L
ANION GAP SERPL CALC-SCNC: 17 MMOL/L
APPEARANCE: CLEAR
AST SERPL-CCNC: 19 U/L
BASOPHILS # BLD AUTO: 0.09 K/UL
BASOPHILS NFR BLD AUTO: 1 %
BILIRUB SERPL-MCNC: 0.4 MG/DL
BILIRUBIN URINE: NEGATIVE
BLOOD URINE: NEGATIVE
BUN SERPL-MCNC: 10 MG/DL
CALCIUM SERPL-MCNC: 10.5 MG/DL
CHLORIDE SERPL-SCNC: 103 MMOL/L
CHOLEST SERPL-MCNC: 295 MG/DL
CHOLEST/HDLC SERPL: 4.9 RATIO
CO2 SERPL-SCNC: 20 MMOL/L
COLOR: COLORLESS
CREAT SERPL-MCNC: 0.61 MG/DL
EOSINOPHIL # BLD AUTO: 0.47 K/UL
EOSINOPHIL NFR BLD AUTO: 5.1 %
ESTIMATED AVERAGE GLUCOSE: 108 MG/DL
GLUCOSE QUALITATIVE U: NEGATIVE
GLUCOSE SERPL-MCNC: 86 MG/DL
HBA1C MFR BLD HPLC: 5.4 %
HCT VFR BLD CALC: 42.6 %
HDLC SERPL-MCNC: 60 MG/DL
HGB BLD-MCNC: 13 G/DL
IMM GRANULOCYTES NFR BLD AUTO: 0.6 %
KETONES URINE: NEGATIVE
LDLC SERPL CALC-MCNC: 174 MG/DL
LEUKOCYTE ESTERASE URINE: NEGATIVE
LYMPHOCYTES # BLD AUTO: 3.26 K/UL
LYMPHOCYTES NFR BLD AUTO: 35.2 %
MAN DIFF?: NORMAL
MCHC RBC-ENTMCNC: 26.1 PG
MCHC RBC-ENTMCNC: 30.5 GM/DL
MCV RBC AUTO: 85.5 FL
MONOCYTES # BLD AUTO: 0.81 K/UL
MONOCYTES NFR BLD AUTO: 8.8 %
NEUTROPHILS # BLD AUTO: 4.56 K/UL
NEUTROPHILS NFR BLD AUTO: 49.3 %
NITRITE URINE: NEGATIVE
PH URINE: 6
PLATELET # BLD AUTO: 294 K/UL
POTASSIUM SERPL-SCNC: 4.4 MMOL/L
PROT SERPL-MCNC: 7.6 G/DL
PROTEIN URINE: NEGATIVE
RBC # BLD: 4.98 M/UL
RBC # FLD: 14.6 %
SODIUM SERPL-SCNC: 140 MMOL/L
SPECIFIC GRAVITY URINE: 1.01
TRIGL SERPL-MCNC: 308 MG/DL
TSH SERPL-ACNC: 1.19 UIU/ML
URATE SERPL-MCNC: 6.3 MG/DL
UROBILINOGEN URINE: NORMAL
WBC # FLD AUTO: 9.25 K/UL

## 2020-08-26 LAB — HEMOCCULT STL QL IA: NEGATIVE

## 2020-09-26 ENCOUNTER — APPOINTMENT (OUTPATIENT)
Dept: MAMMOGRAPHY | Facility: CLINIC | Age: 55
End: 2020-09-26
Payer: COMMERCIAL

## 2020-09-26 ENCOUNTER — OUTPATIENT (OUTPATIENT)
Dept: OUTPATIENT SERVICES | Facility: HOSPITAL | Age: 55
LOS: 1 days | End: 2020-09-26
Payer: COMMERCIAL

## 2020-09-26 ENCOUNTER — APPOINTMENT (OUTPATIENT)
Dept: ULTRASOUND IMAGING | Facility: CLINIC | Age: 55
End: 2020-09-26
Payer: COMMERCIAL

## 2020-09-26 ENCOUNTER — RESULT REVIEW (OUTPATIENT)
Age: 55
End: 2020-09-26

## 2020-09-26 DIAGNOSIS — Z98.89 OTHER SPECIFIED POSTPROCEDURAL STATES: Chronic | ICD-10-CM

## 2020-09-26 DIAGNOSIS — Z90.710 ACQUIRED ABSENCE OF BOTH CERVIX AND UTERUS: Chronic | ICD-10-CM

## 2020-09-26 DIAGNOSIS — Z12.39 ENCOUNTER FOR OTHER SCREENING FOR MALIGNANT NEOPLASM OF BREAST: ICD-10-CM

## 2020-09-26 DIAGNOSIS — Z00.00 ENCOUNTER FOR GENERAL ADULT MEDICAL EXAMINATION WITHOUT ABNORMAL FINDINGS: ICD-10-CM

## 2020-09-26 PROCEDURE — 77063 BREAST TOMOSYNTHESIS BI: CPT

## 2020-09-26 PROCEDURE — 77067 SCR MAMMO BI INCL CAD: CPT

## 2020-09-26 PROCEDURE — 77067 SCR MAMMO BI INCL CAD: CPT | Mod: 26

## 2020-09-26 PROCEDURE — 77063 BREAST TOMOSYNTHESIS BI: CPT | Mod: 26

## 2020-10-23 ENCOUNTER — RX RENEWAL (OUTPATIENT)
Age: 55
End: 2020-10-23

## 2020-10-26 ENCOUNTER — RX RENEWAL (OUTPATIENT)
Age: 55
End: 2020-10-26

## 2021-03-01 ENCOUNTER — APPOINTMENT (OUTPATIENT)
Dept: FAMILY MEDICINE | Facility: CLINIC | Age: 56
End: 2021-03-01
Payer: COMMERCIAL

## 2021-03-01 VITALS
OXYGEN SATURATION: 99 % | TEMPERATURE: 98.1 F | HEIGHT: 63 IN | HEART RATE: 76 BPM | BODY MASS INDEX: 38.62 KG/M2 | SYSTOLIC BLOOD PRESSURE: 136 MMHG | RESPIRATION RATE: 16 BRPM | WEIGHT: 218 LBS | DIASTOLIC BLOOD PRESSURE: 72 MMHG

## 2021-03-01 DIAGNOSIS — M54.12 RADICULOPATHY, CERVICAL REGION: ICD-10-CM

## 2021-03-01 PROCEDURE — 99214 OFFICE O/P EST MOD 30 MIN: CPT

## 2021-03-01 PROCEDURE — 99072 ADDL SUPL MATRL&STAF TM PHE: CPT

## 2021-03-01 RX ORDER — ERGOCALCIFEROL 1.25 MG/1
1.25 MG CAPSULE, LIQUID FILLED ORAL
Qty: 12 | Refills: 0 | Status: COMPLETED | COMMUNITY
Start: 2020-08-11 | End: 2020-10-21

## 2021-03-01 NOTE — ASSESSMENT
[FreeTextEntry1] : Cervical radiculopathy\par PT rx given\par Medrol\par After its done\par Naproxen \par MRI ordered\par See Spine if symptoms not improved with PT

## 2021-03-01 NOTE — HISTORY OF PRESENT ILLNESS
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 had MRI "many years ago"\par Sits on the desk a lot\par Had PT few years ago, it helped.\par Sharp pain\par tingling numbness and weakness on left arm since then.\par She is right hand dominant.\par \par \par

## 2021-03-08 ENCOUNTER — OUTPATIENT (OUTPATIENT)
Dept: OUTPATIENT SERVICES | Facility: HOSPITAL | Age: 56
LOS: 1 days | End: 2021-03-08
Payer: COMMERCIAL

## 2021-03-08 ENCOUNTER — APPOINTMENT (OUTPATIENT)
Dept: MRI IMAGING | Facility: CLINIC | Age: 56
End: 2021-03-08
Payer: COMMERCIAL

## 2021-03-08 DIAGNOSIS — Z90.710 ACQUIRED ABSENCE OF BOTH CERVIX AND UTERUS: Chronic | ICD-10-CM

## 2021-03-08 DIAGNOSIS — Z98.89 OTHER SPECIFIED POSTPROCEDURAL STATES: Chronic | ICD-10-CM

## 2021-03-08 DIAGNOSIS — M54.12 RADICULOPATHY, CERVICAL REGION: ICD-10-CM

## 2021-03-08 DIAGNOSIS — Z00.8 ENCOUNTER FOR OTHER GENERAL EXAMINATION: ICD-10-CM

## 2021-03-08 PROCEDURE — 72141 MRI NECK SPINE W/O DYE: CPT | Mod: 26

## 2021-03-08 PROCEDURE — 72141 MRI NECK SPINE W/O DYE: CPT

## 2021-04-14 ENCOUNTER — NON-APPOINTMENT (OUTPATIENT)
Age: 56
End: 2021-04-14

## 2021-04-14 ENCOUNTER — APPOINTMENT (OUTPATIENT)
Dept: FAMILY MEDICINE | Facility: CLINIC | Age: 56
End: 2021-04-14
Payer: COMMERCIAL

## 2021-04-14 VITALS
DIASTOLIC BLOOD PRESSURE: 80 MMHG | HEART RATE: 81 BPM | SYSTOLIC BLOOD PRESSURE: 142 MMHG | RESPIRATION RATE: 16 BRPM | WEIGHT: 217 LBS | BODY MASS INDEX: 38.45 KG/M2 | HEIGHT: 63 IN | OXYGEN SATURATION: 99 % | TEMPERATURE: 97.9 F

## 2021-04-14 DIAGNOSIS — R79.89 OTHER SPECIFIED ABNORMAL FINDINGS OF BLOOD CHEMISTRY: ICD-10-CM

## 2021-04-14 DIAGNOSIS — M72.2 PLANTAR FASCIAL FIBROMATOSIS: ICD-10-CM

## 2021-04-14 DIAGNOSIS — Z13.39 ENCOUNTER FOR SCREENING EXAM FOR OTHER MENTAL HEALTH AND BEHAVIORAL DISORDERS: ICD-10-CM

## 2021-04-14 PROCEDURE — 99072 ADDL SUPL MATRL&STAF TM PHE: CPT

## 2021-04-14 PROCEDURE — 99396 PREV VISIT EST AGE 40-64: CPT | Mod: 25

## 2021-04-14 PROCEDURE — 36415 COLL VENOUS BLD VENIPUNCTURE: CPT

## 2021-04-14 PROCEDURE — G0447 BEHAVIOR COUNSEL OBESITY 15M: CPT | Mod: NC

## 2021-04-14 PROCEDURE — G0442 ANNUAL ALCOHOL SCREEN 15 MIN: CPT | Mod: NC

## 2021-04-14 PROCEDURE — 93000 ELECTROCARDIOGRAM COMPLETE: CPT | Mod: 59

## 2021-04-14 PROCEDURE — G0444 DEPRESSION SCREEN ANNUAL: CPT | Mod: NC,59

## 2021-04-14 RX ORDER — ALBUTEROL SULFATE 90 UG/1
108 (90 BASE) AEROSOL, METERED RESPIRATORY (INHALATION)
Qty: 1 | Refills: 0 | Status: COMPLETED | COMMUNITY
Start: 2020-01-28 | End: 2020-02-18

## 2021-04-14 RX ORDER — METHYLPREDNISOLONE 4 MG/1
4 TABLET ORAL
Qty: 1 | Refills: 0 | Status: COMPLETED | COMMUNITY
Start: 2021-03-01 | End: 2021-03-11

## 2021-04-14 NOTE — HISTORY OF PRESENT ILLNESS
[FreeTextEntry1] : annual [de-identified] : Ms. CLARENCE FELICIANO is a 56 year old female presenting for an annual\par Overall has been doing well. \par Right heel pain for 4 weeks. More in the morning gets better as day progresses.\par will consider tdap at next visit. Thinks she may have had it 3 years ago. WIll check records\par up to date with pap \par Schedule mammogram.\par not had colonoscopy

## 2021-04-14 NOTE — COUNSELING
[Potential consequences of obesity discussed] : Potential consequences of obesity discussed [Benefits of weight loss discussed] : Benefits of weight loss discussed [Encouraged to maintain food diary] : Encouraged to maintain food diary [Encouraged to increase physical activity] : Encouraged to increase physical activity [Encouraged to use exercise tracking device] : Encouraged to use exercise tracking device [Weigh Self Weekly] : weigh self weekly [Decrease Portions] : decrease portions [____ min/wk Activity] : [unfilled] min/wk activity [Keep Food Diary] : keep food diary [Needs reinforcement, provided] : Patient needs reinforcement on understanding of disease, goals and obesity follow-up plan; reinforcement was provided [FreeTextEntry2] : plant based whole food diet. [FreeTextEntry4] : 15

## 2021-04-14 NOTE — ASSESSMENT
[FreeTextEntry1] : Annual\par SBIRT negative\par Depression screen negative\par Check comprehensive labs, in office today\par Vaccines up to date \par EKG NSR no acute changes\par GYN\par  pap up to date \par Mammogram 9/20\par Colonoscopy refuses\par Fecal occult 8/20\par refuses  tdap at next visit. Thinks she may have had it 3 years ago. WIll check records\par Due for pap advised to go\par \par Obesity Discussed diet and exercise.\par \par Right Plantar Fasciitis\par exercises explained/ Shoe inserts/ NSAIDS\par RTO if not improved or worse.\par HLD: ran out of Simvastatin last week\par Rx renewed\par Check labs.\par \par \par

## 2021-04-14 NOTE — HEALTH RISK ASSESSMENT
[Good] : ~his/her~  mood as  good [Yes] : Yes [Monthly or less (1 pt)] : Monthly or less (1 point) [1 or 2 (0 pts)] : 1 or 2 (0 points) [Never (0 pts)] : Never (0 points) [No] : In the past 12 months have you used drugs other than those required for medical reasons? No [0] : 2) Feeling down, depressed, or hopeless: Not at all (0) [Patient reported mammogram was normal] : Patient reported mammogram was normal [Patient reported PAP Smear was normal] : Patient reported PAP Smear was normal [Patient declined colonoscopy] : Patient declined colonoscopy [Behavioral] : behavioral [With Family] : lives with family [Employed] : employed [] :  [Sexually Active] : sexually active [Feels Safe at Home] : Feels safe at home [Smoke Detector] : smoke detector [Carbon Monoxide Detector] : carbon monoxide detector [Seat Belt] :  uses seat belt [Sunscreen] : uses sunscreen [With Patient/Caregiver] : With Patient/Caregiver [Designated Healthcare Proxy] : Designated healthcare proxy [Name: ___] : Health Care Proxy's Name: [unfilled]  [Relationship: ___] : Relationship: [unfilled] [] : No [Audit-CScore] : 1 [[id number]] : 0 [Change in mental status noted] : No change in mental status noted [High Risk Behavior] : no high risk behavior [Reports changes in hearing] : Reports no changes in hearing [Reports changes in vision] : Reports no changes in vision [Reports changes in dental health] : Reports no changes in dental health [TB Exposure] : is not being exposed to tuberculosis [MammogramDate] : 9/2020 [PapSmearDate] : 1/2019 [ColonoscopyComments] : fecal occult 8/20 [HIVDate] : 4/2019 [HepatitisCDate] : 4/2019 [FreeTextEntry2] :  for [facility] [AdvancecareDate] : 4/2021

## 2021-04-19 LAB
ALBUMIN SERPL ELPH-MCNC: 4.5 G/DL
ALP BLD-CCNC: 92 U/L
ALT SERPL-CCNC: 23 U/L
ANION GAP SERPL CALC-SCNC: 12 MMOL/L
APPEARANCE: CLEAR
AST SERPL-CCNC: 24 U/L
BASOPHILS # BLD AUTO: 0.07 K/UL
BASOPHILS NFR BLD AUTO: 0.7 %
BILIRUB SERPL-MCNC: 0.5 MG/DL
BILIRUBIN URINE: NEGATIVE
BLOOD URINE: NEGATIVE
BUN SERPL-MCNC: 12 MG/DL
CALCIUM SERPL-MCNC: 10.8 MG/DL
CHLORIDE SERPL-SCNC: 104 MMOL/L
CHOLEST SERPL-MCNC: 209 MG/DL
CO2 SERPL-SCNC: 24 MMOL/L
COLOR: NORMAL
CREAT SERPL-MCNC: 0.57 MG/DL
EOSINOPHIL # BLD AUTO: 0.54 K/UL
EOSINOPHIL NFR BLD AUTO: 5.7 %
ESTIMATED AVERAGE GLUCOSE: 120 MG/DL
GLUCOSE QUALITATIVE U: NEGATIVE
GLUCOSE SERPL-MCNC: 103 MG/DL
HBA1C MFR BLD HPLC: 5.8 %
HCT VFR BLD CALC: 42.7 %
HDLC SERPL-MCNC: 57 MG/DL
HGB BLD-MCNC: 13.2 G/DL
IMM GRANULOCYTES NFR BLD AUTO: 0.4 %
KETONES URINE: NEGATIVE
LDLC SERPL CALC-MCNC: 116 MG/DL
LEUKOCYTE ESTERASE URINE: NEGATIVE
LYMPHOCYTES # BLD AUTO: 2.9 K/UL
LYMPHOCYTES NFR BLD AUTO: 30.6 %
MAN DIFF?: NORMAL
MCHC RBC-ENTMCNC: 26.2 PG
MCHC RBC-ENTMCNC: 30.9 GM/DL
MCV RBC AUTO: 84.9 FL
MONOCYTES # BLD AUTO: 0.71 K/UL
MONOCYTES NFR BLD AUTO: 7.5 %
NEUTROPHILS # BLD AUTO: 5.22 K/UL
NEUTROPHILS NFR BLD AUTO: 55.1 %
NITRITE URINE: NEGATIVE
NONHDLC SERPL-MCNC: 152 MG/DL
PH URINE: 5.5
PLATELET # BLD AUTO: 290 K/UL
POTASSIUM SERPL-SCNC: 4.3 MMOL/L
PROT SERPL-MCNC: 7.3 G/DL
PROTEIN URINE: NEGATIVE
RBC # BLD: 5.03 M/UL
RBC # FLD: 14 %
SODIUM SERPL-SCNC: 140 MMOL/L
SPECIFIC GRAVITY URINE: 1.01
TRIGL SERPL-MCNC: 182 MG/DL
TSH SERPL-ACNC: 1.82 UIU/ML
URATE SERPL-MCNC: 6.1 MG/DL
UROBILINOGEN URINE: NORMAL
WBC # FLD AUTO: 9.48 K/UL

## 2021-08-17 ENCOUNTER — APPOINTMENT (OUTPATIENT)
Dept: FAMILY MEDICINE | Facility: CLINIC | Age: 56
End: 2021-08-17
Payer: COMMERCIAL

## 2021-08-17 VITALS
BODY MASS INDEX: 38.09 KG/M2 | DIASTOLIC BLOOD PRESSURE: 82 MMHG | HEIGHT: 63 IN | TEMPERATURE: 97.3 F | HEART RATE: 63 BPM | RESPIRATION RATE: 16 BRPM | WEIGHT: 215 LBS | OXYGEN SATURATION: 99 % | SYSTOLIC BLOOD PRESSURE: 134 MMHG

## 2021-08-17 DIAGNOSIS — Z12.11 ENCOUNTER FOR SCREENING FOR MALIGNANT NEOPLASM OF COLON: ICD-10-CM

## 2021-08-17 DIAGNOSIS — R73.03 PREDIABETES.: ICD-10-CM

## 2021-08-17 DIAGNOSIS — E66.9 OBESITY, UNSPECIFIED: ICD-10-CM

## 2021-08-17 PROCEDURE — 99214 OFFICE O/P EST MOD 30 MIN: CPT

## 2021-08-17 NOTE — ASSESSMENT
[FreeTextEntry1] : Obesity Discussed diet and exercise.\par Start Saxenda\par Discussed food dairy/ Exercise tracker\par Follow up in 1 month\par \par HLD: On Simvastatin\par reports compliance\par check labs\par \par Pre Diabetes\par Advised regarding diet and Weight loss.

## 2021-08-17 NOTE — PHYSICAL EXAM
[No Acute Distress] : no acute distress [Well Nourished] : well nourished [Well Developed] : well developed [Well-Appearing] : well-appearing [Normal Sclera/Conjunctiva] : normal sclera/conjunctiva [Normal Outer Ear/Nose] : the outer ears and nose were normal in appearance [Supple] : supple [No Lymphadenopathy] : no lymphadenopathy [No Respiratory Distress] : no respiratory distress  [Clear to Auscultation] : lungs were clear to auscultation bilaterally [No Accessory Muscle Use] : no accessory muscle use [Normal Rate] : normal rate  [Regular Rhythm] : with a regular rhythm [Normal S1, S2] : normal S1 and S2 [No Murmur] : no murmur heard [Pedal Pulses Present] : the pedal pulses are present [No Edema] : there was no peripheral edema [Soft] : abdomen soft [Non Tender] : non-tender [Non-distended] : non-distended [No Masses] : no abdominal mass palpated [No HSM] : no HSM [Normal Bowel Sounds] : normal bowel sounds [Normal Anterior Cervical Nodes] : no anterior cervical lymphadenopathy [No CVA Tenderness] : no CVA  tenderness [No Spinal Tenderness] : no spinal tenderness [No Joint Swelling] : no joint swelling [No Rash] : no rash [Normal Gait] : normal gait [Coordination Grossly Intact] : coordination grossly intact [Normal Affect] : the affect was normal [Alert and Oriented x3] : oriented to person, place, and time [Normal Insight/Judgement] : insight and judgment were intact

## 2021-08-17 NOTE — HISTORY OF PRESENT ILLNESS
[FreeTextEntry1] : Follow up [de-identified] : Ms. CLARENCE FELICIANO is a 56 year old female presenting for a follow up\par Overall has been doing well. \par Obesity Weight unchanged. \par not had colonoscopy

## 2021-10-05 ENCOUNTER — RX RENEWAL (OUTPATIENT)
Age: 56
End: 2021-10-05

## 2021-10-09 ENCOUNTER — APPOINTMENT (OUTPATIENT)
Dept: MAMMOGRAPHY | Facility: CLINIC | Age: 56
End: 2021-10-09
Payer: COMMERCIAL

## 2021-10-09 ENCOUNTER — RESULT REVIEW (OUTPATIENT)
Age: 56
End: 2021-10-09

## 2021-10-09 ENCOUNTER — OUTPATIENT (OUTPATIENT)
Dept: OUTPATIENT SERVICES | Facility: HOSPITAL | Age: 56
LOS: 1 days | End: 2021-10-09
Payer: COMMERCIAL

## 2021-10-09 DIAGNOSIS — Z90.710 ACQUIRED ABSENCE OF BOTH CERVIX AND UTERUS: Chronic | ICD-10-CM

## 2021-10-09 DIAGNOSIS — Z98.89 OTHER SPECIFIED POSTPROCEDURAL STATES: Chronic | ICD-10-CM

## 2021-10-09 DIAGNOSIS — Z12.39 ENCOUNTER FOR OTHER SCREENING FOR MALIGNANT NEOPLASM OF BREAST: ICD-10-CM

## 2021-10-09 PROCEDURE — 77067 SCR MAMMO BI INCL CAD: CPT | Mod: 26

## 2021-10-09 PROCEDURE — 77063 BREAST TOMOSYNTHESIS BI: CPT | Mod: 26

## 2021-10-09 PROCEDURE — 77067 SCR MAMMO BI INCL CAD: CPT

## 2021-10-09 PROCEDURE — 77063 BREAST TOMOSYNTHESIS BI: CPT

## 2021-10-19 ENCOUNTER — RESULT CHARGE (OUTPATIENT)
Age: 56
End: 2021-10-19

## 2021-10-20 ENCOUNTER — APPOINTMENT (OUTPATIENT)
Dept: FAMILY MEDICINE | Facility: CLINIC | Age: 56
End: 2021-10-20
Payer: COMMERCIAL

## 2021-10-20 ENCOUNTER — NON-APPOINTMENT (OUTPATIENT)
Age: 56
End: 2021-10-20

## 2021-10-20 VITALS
WEIGHT: 215 LBS | TEMPERATURE: 97.7 F | DIASTOLIC BLOOD PRESSURE: 70 MMHG | HEART RATE: 88 BPM | RESPIRATION RATE: 15 BRPM | BODY MASS INDEX: 38.09 KG/M2 | SYSTOLIC BLOOD PRESSURE: 120 MMHG | HEIGHT: 63 IN

## 2021-10-20 DIAGNOSIS — N64.4 MASTODYNIA: ICD-10-CM

## 2021-10-20 PROCEDURE — 36415 COLL VENOUS BLD VENIPUNCTURE: CPT

## 2021-10-20 PROCEDURE — 99213 OFFICE O/P EST LOW 20 MIN: CPT | Mod: 25

## 2021-10-20 PROCEDURE — 93000 ELECTROCARDIOGRAM COMPLETE: CPT

## 2021-10-20 RX ORDER — EPINEPHRINE 0.3 MG/.3ML
0.3 INJECTION INTRAMUSCULAR
Qty: 1 | Refills: 0 | Status: COMPLETED | COMMUNITY
Start: 2018-05-04 | End: 2021-10-20

## 2021-10-20 NOTE — REVIEW OF SYSTEMS
[Negative] : Heme/Lymph [FreeTextEntry9] : see HPI [de-identified] : See HPI [de-identified] : See HPI

## 2021-10-20 NOTE — PHYSICAL EXAM
[Normal Appearance] : normal in appearance [No Masses] : no palpable masses [No Nipple Discharge] : no nipple discharge [No Axillary Lymphadenopathy] : no axillary lymphadenopathy [Normal Axillary Nodes] : no axillary lymphadenopathy [Normal] : affect was normal and insight and judgment were intact

## 2021-10-20 NOTE — HISTORY OF PRESENT ILLNESS
[FreeTextEntry8] : Patient complains of a 6 day history of pain lateral to the left breast.  She denies any radiation of the pain, but states that it is throbbing and dull.  She does state that she had her mammo done 4 days prior to the pain starting. She admits that when she left, she had tenderness to the left breast and axilla, later developing enlargement and soreness to the axillary LN.  This dissipated after 4 days, immediately followed by her current pain.  She did take ASA for the pain with mild relief, but has not done anything for the pain.  Her mammo was normal.  She does also state that several days ago she did have one episode of being nauseous and dizzy, but this dissipated on it's own.  She has felt fine since, but she states that the pain in the breast has not changed overall.  \par \par Patient does also admit to having her COVID booster on the affected side 2 days prior to her mammo.

## 2021-10-20 NOTE — PLAN
[FreeTextEntry1] : Patient was reassured that her ECG is normal, and that her pain is likely related to  her recent COVID booster and the excessive soft tissue manipulation from her mammography.  She no longer has any LAD present, which can be expected following vaccination.  Her nausea was also very likely related to this as well.  She will use a warm compress, and she will take Advil as needed for her soft tissue soreness.  She was reassured that this will continue to dissipate with time and being consistent with the above, but if the LAD returns, or if the pain progresses, she will have the US done of the left breast.  \par \par Labs following her August office visit will also be performed today.

## 2021-10-22 DIAGNOSIS — E83.52 HYPERCALCEMIA: ICD-10-CM

## 2021-10-22 LAB
ALBUMIN SERPL ELPH-MCNC: 4.8 G/DL
ALP BLD-CCNC: 129 U/L
ALT SERPL-CCNC: 30 U/L
ANION GAP SERPL CALC-SCNC: 13 MMOL/L
AST SERPL-CCNC: 26 U/L
BILIRUB SERPL-MCNC: 0.5 MG/DL
BUN SERPL-MCNC: 8 MG/DL
CALCIUM SERPL-MCNC: 10.9 MG/DL
CHLORIDE SERPL-SCNC: 101 MMOL/L
CHOLEST SERPL-MCNC: 199 MG/DL
CO2 SERPL-SCNC: 26 MMOL/L
CREAT SERPL-MCNC: 0.63 MG/DL
ESTIMATED AVERAGE GLUCOSE: 128 MG/DL
GLUCOSE SERPL-MCNC: 86 MG/DL
HBA1C MFR BLD HPLC: 6.1 %
HDLC SERPL-MCNC: 46 MG/DL
LDLC SERPL CALC-MCNC: 127 MG/DL
NONHDLC SERPL-MCNC: 153 MG/DL
POTASSIUM SERPL-SCNC: 4.6 MMOL/L
PROT SERPL-MCNC: 7.4 G/DL
SODIUM SERPL-SCNC: 140 MMOL/L
TRIGL SERPL-MCNC: 129 MG/DL

## 2021-10-25 LAB
CALCIUM SERPL-MCNC: 10.8 MG/DL
PARATHYROID HORMONE INTACT: 52 PG/ML

## 2021-10-26 ENCOUNTER — NON-APPOINTMENT (OUTPATIENT)
Age: 56
End: 2021-10-26

## 2021-12-31 NOTE — PATIENT PROFILE ADULT. - NSTOBACCONEVERSMOKERY/N_GEN_A
DISCHARGE    Cataract Post op Instructions     Follow directions and activity sheet from Dr Mason's office ([facility])      DIET   - Resume regular diet     ACTIVITIES  - (follow our activity sheet given in the office)  quiet activities  - no driving until 24 hours after you feel completely awake; don't lift over 30 lbs       EYE DROPS  - when you take off the patch in 4 hours the day 'starts again' use drops as if it's a new day    COMBINATION DROPS:   antibiotic, anti-inflammatories                      *Imprimis (Prednisolone, Gatifloxacin, & Bromday)      one drop operated eye 4 x per day    (substitutes include Neopolydex, Ofloxancin, Ketorolac)        GLAUCOMA DROPS                       *Timolol one drop operated eye 2 x  (or subsitute with Brimonidine drops 2 x)    OTHER DROPS                     *USE ALL YOU PRESCRIPTION DROPS - ESPECIALLY GLAUCOMA DROPS - stop tears and other otc drops                                      Remove patch in 4 hours  - throw the patch away - NO patching after that  -  Use the 'shield' while napping/sleeping  For pain no restrictions - use whatever otc pain reliever that you usually use - severe pain CALL US   Driving - you can drive 24 hours AFTER feeling totally awake   Resume regular medications    You may sleep on either side   See us tomorrow  in [place]  - at the appointed time  IF ANY QUESTIONS OR PAIN CALL THE OFFICE  [phone]  [place] - [phone] Sy                        AFTER HOURS ONLY(I turn off my phone in the office)  DR [facility] [phone]    No

## 2022-01-01 ENCOUNTER — RESULT REVIEW (OUTPATIENT)
Age: 57
End: 2022-01-01

## 2022-01-01 ENCOUNTER — APPOINTMENT (OUTPATIENT)
Age: 57
End: 2022-01-01

## 2022-01-01 ENCOUNTER — APPOINTMENT (OUTPATIENT)
Dept: HEMATOLOGY ONCOLOGY | Facility: CLINIC | Age: 57
End: 2022-01-01

## 2022-01-01 ENCOUNTER — OUTPATIENT (OUTPATIENT)
Dept: OUTPATIENT SERVICES | Facility: HOSPITAL | Age: 57
LOS: 1 days | Discharge: HOME | End: 2022-01-01

## 2022-01-01 ENCOUNTER — OUTPATIENT (OUTPATIENT)
Dept: OUTPATIENT SERVICES | Facility: HOSPITAL | Age: 57
LOS: 1 days | End: 2022-01-01
Payer: COMMERCIAL

## 2022-01-01 ENCOUNTER — APPOINTMENT (OUTPATIENT)
Dept: CT IMAGING | Facility: CLINIC | Age: 57
End: 2022-01-01

## 2022-01-01 ENCOUNTER — APPOINTMENT (OUTPATIENT)
Dept: HEMATOLOGY ONCOLOGY | Facility: CLINIC | Age: 57
End: 2022-01-01
Payer: COMMERCIAL

## 2022-01-01 ENCOUNTER — FORM ENCOUNTER (OUTPATIENT)
Age: 57
End: 2022-01-01

## 2022-01-01 ENCOUNTER — APPOINTMENT (OUTPATIENT)
Dept: GASTROENTEROLOGY | Facility: CLINIC | Age: 57
End: 2022-01-01
Payer: COMMERCIAL

## 2022-01-01 ENCOUNTER — NON-APPOINTMENT (OUTPATIENT)
Age: 57
End: 2022-01-01

## 2022-01-01 ENCOUNTER — APPOINTMENT (OUTPATIENT)
Dept: PHYSICAL MEDICINE AND REHAB | Facility: CLINIC | Age: 57
End: 2022-01-01
Payer: COMMERCIAL

## 2022-01-01 ENCOUNTER — APPOINTMENT (OUTPATIENT)
Dept: FAMILY MEDICINE | Facility: CLINIC | Age: 57
End: 2022-01-01
Payer: COMMERCIAL

## 2022-01-01 ENCOUNTER — APPOINTMENT (OUTPATIENT)
Dept: SURGICAL ONCOLOGY | Facility: CLINIC | Age: 57
End: 2022-01-01
Payer: COMMERCIAL

## 2022-01-01 ENCOUNTER — OUTPATIENT (OUTPATIENT)
Dept: OUTPATIENT SERVICES | Facility: HOSPITAL | Age: 57
LOS: 1 days | End: 2022-01-01

## 2022-01-01 ENCOUNTER — APPOINTMENT (OUTPATIENT)
Dept: ULTRASOUND IMAGING | Facility: CLINIC | Age: 57
End: 2022-01-01

## 2022-01-01 ENCOUNTER — APPOINTMENT (OUTPATIENT)
Dept: CT IMAGING | Facility: CLINIC | Age: 57
End: 2022-01-01
Payer: COMMERCIAL

## 2022-01-01 ENCOUNTER — APPOINTMENT (OUTPATIENT)
Dept: INTERVENTIONAL RADIOLOGY/VASCULAR | Facility: CLINIC | Age: 57
End: 2022-01-01

## 2022-01-01 ENCOUNTER — LABORATORY RESULT (OUTPATIENT)
Age: 57
End: 2022-01-01

## 2022-01-01 ENCOUNTER — TRANSCRIPTION ENCOUNTER (OUTPATIENT)
Age: 57
End: 2022-01-01

## 2022-01-01 ENCOUNTER — APPOINTMENT (OUTPATIENT)
Dept: FAMILY MEDICINE | Facility: CLINIC | Age: 57
End: 2022-01-01

## 2022-01-01 ENCOUNTER — APPOINTMENT (OUTPATIENT)
Dept: GASTROENTEROLOGY | Facility: GI CENTER | Age: 57
End: 2022-01-01

## 2022-01-01 ENCOUNTER — APPOINTMENT (OUTPATIENT)
Dept: MAMMOGRAPHY | Facility: CLINIC | Age: 57
End: 2022-01-01

## 2022-01-01 ENCOUNTER — INPATIENT (INPATIENT)
Facility: HOSPITAL | Age: 57
LOS: 1 days | Discharge: HOME | DRG: 69 | End: 2022-02-19
Attending: INTERNAL MEDICINE | Admitting: STUDENT IN AN ORGANIZED HEALTH CARE EDUCATION/TRAINING PROGRAM
Payer: COMMERCIAL

## 2022-01-01 ENCOUNTER — APPOINTMENT (OUTPATIENT)
Dept: RADIOLOGY | Facility: CLINIC | Age: 57
End: 2022-01-01

## 2022-01-01 VITALS
BODY MASS INDEX: 27.64 KG/M2 | WEIGHT: 156.02 LBS | SYSTOLIC BLOOD PRESSURE: 125 MMHG | HEART RATE: 87 BPM | HEIGHT: 63 IN | DIASTOLIC BLOOD PRESSURE: 80 MMHG | OXYGEN SATURATION: 98 %

## 2022-01-01 VITALS
HEART RATE: 78 BPM | TEMPERATURE: 98 F | SYSTOLIC BLOOD PRESSURE: 133 MMHG | RESPIRATION RATE: 18 BRPM | OXYGEN SATURATION: 98 % | DIASTOLIC BLOOD PRESSURE: 83 MMHG

## 2022-01-01 VITALS
HEIGHT: 62 IN | HEART RATE: 77 BPM | SYSTOLIC BLOOD PRESSURE: 143 MMHG | HEART RATE: 77 BPM | SYSTOLIC BLOOD PRESSURE: 142 MMHG | DIASTOLIC BLOOD PRESSURE: 85 MMHG | OXYGEN SATURATION: 100 % | BODY MASS INDEX: 28.71 KG/M2 | BODY MASS INDEX: 29.08 KG/M2 | HEIGHT: 62 IN | WEIGHT: 158.03 LBS | OXYGEN SATURATION: 98 % | WEIGHT: 156 LBS | DIASTOLIC BLOOD PRESSURE: 87 MMHG

## 2022-01-01 VITALS
RESPIRATION RATE: 16 BRPM | OXYGEN SATURATION: 99 % | BODY MASS INDEX: 35.44 KG/M2 | TEMPERATURE: 97.3 F | HEART RATE: 82 BPM | WEIGHT: 200 LBS | SYSTOLIC BLOOD PRESSURE: 132 MMHG | DIASTOLIC BLOOD PRESSURE: 72 MMHG | HEIGHT: 63 IN

## 2022-01-01 VITALS
RESPIRATION RATE: 14 BRPM | SYSTOLIC BLOOD PRESSURE: 134 MMHG | TEMPERATURE: 98.3 F | WEIGHT: 202 LBS | HEIGHT: 63 IN | DIASTOLIC BLOOD PRESSURE: 82 MMHG | BODY MASS INDEX: 35.79 KG/M2 | HEART RATE: 82 BPM | OXYGEN SATURATION: 97 %

## 2022-01-01 VITALS
TEMPERATURE: 97 F | HEIGHT: 63 IN | HEART RATE: 73 BPM | DIASTOLIC BLOOD PRESSURE: 78 MMHG | RESPIRATION RATE: 18 BRPM | WEIGHT: 197.09 LBS | OXYGEN SATURATION: 99 % | SYSTOLIC BLOOD PRESSURE: 138 MMHG

## 2022-01-01 VITALS
DIASTOLIC BLOOD PRESSURE: 86 MMHG | HEIGHT: 63 IN | BODY MASS INDEX: 28.27 KG/M2 | SYSTOLIC BLOOD PRESSURE: 126 MMHG | OXYGEN SATURATION: 96 % | HEART RATE: 120 BPM | WEIGHT: 159.56 LBS

## 2022-01-01 VITALS
HEIGHT: 63 IN | TEMPERATURE: 98 F | DIASTOLIC BLOOD PRESSURE: 85 MMHG | SYSTOLIC BLOOD PRESSURE: 162 MMHG | OXYGEN SATURATION: 99 % | HEART RATE: 78 BPM | RESPIRATION RATE: 16 BRPM | WEIGHT: 197.09 LBS

## 2022-01-01 VITALS — SYSTOLIC BLOOD PRESSURE: 120 MMHG | DIASTOLIC BLOOD PRESSURE: 70 MMHG

## 2022-01-01 VITALS
WEIGHT: 161.04 LBS | HEIGHT: 62 IN | BODY MASS INDEX: 29.64 KG/M2 | SYSTOLIC BLOOD PRESSURE: 140 MMHG | OXYGEN SATURATION: 100 % | DIASTOLIC BLOOD PRESSURE: 82 MMHG | HEART RATE: 66 BPM

## 2022-01-01 VITALS
TEMPERATURE: 97.8 F | RESPIRATION RATE: 16 BRPM | DIASTOLIC BLOOD PRESSURE: 84 MMHG | HEART RATE: 77 BPM | SYSTOLIC BLOOD PRESSURE: 144 MMHG | OXYGEN SATURATION: 100 %

## 2022-01-01 VITALS
HEART RATE: 102 BPM | BODY MASS INDEX: 29.77 KG/M2 | DIASTOLIC BLOOD PRESSURE: 77 MMHG | OXYGEN SATURATION: 95 % | SYSTOLIC BLOOD PRESSURE: 116 MMHG | HEIGHT: 63 IN | WEIGHT: 168.01 LBS

## 2022-01-01 VITALS
SYSTOLIC BLOOD PRESSURE: 149 MMHG | HEIGHT: 63 IN | DIASTOLIC BLOOD PRESSURE: 90 MMHG | TEMPERATURE: 99 F | HEART RATE: 80 BPM | WEIGHT: 197.09 LBS | OXYGEN SATURATION: 98 % | RESPIRATION RATE: 18 BRPM

## 2022-01-01 VITALS
DIASTOLIC BLOOD PRESSURE: 82 MMHG | RESPIRATION RATE: 16 BRPM | SYSTOLIC BLOOD PRESSURE: 122 MMHG | BODY MASS INDEX: 32.6 KG/M2 | HEART RATE: 85 BPM | OXYGEN SATURATION: 98 % | TEMPERATURE: 97.3 F | HEIGHT: 63 IN | WEIGHT: 184 LBS

## 2022-01-01 VITALS
DIASTOLIC BLOOD PRESSURE: 84 MMHG | HEIGHT: 62 IN | HEART RATE: 63 BPM | OXYGEN SATURATION: 100 % | BODY MASS INDEX: 28.89 KG/M2 | SYSTOLIC BLOOD PRESSURE: 141 MMHG | TEMPERATURE: 97.9 F | WEIGHT: 157 LBS | RESPIRATION RATE: 16 BRPM

## 2022-01-01 VITALS
WEIGHT: 156.03 LBS | DIASTOLIC BLOOD PRESSURE: 85 MMHG | HEIGHT: 62.99 IN | HEART RATE: 65 BPM | BODY MASS INDEX: 27.64 KG/M2 | SYSTOLIC BLOOD PRESSURE: 150 MMHG | OXYGEN SATURATION: 100 %

## 2022-01-01 VITALS
TEMPERATURE: 97.3 F | HEIGHT: 63 IN | BODY MASS INDEX: 35.26 KG/M2 | HEART RATE: 80 BPM | WEIGHT: 199 LBS | SYSTOLIC BLOOD PRESSURE: 143 MMHG | OXYGEN SATURATION: 96 % | DIASTOLIC BLOOD PRESSURE: 84 MMHG

## 2022-01-01 VITALS
HEIGHT: 63 IN | SYSTOLIC BLOOD PRESSURE: 138 MMHG | HEART RATE: 102 BPM | OXYGEN SATURATION: 95 % | DIASTOLIC BLOOD PRESSURE: 85 MMHG | WEIGHT: 192.04 LBS | BODY MASS INDEX: 34.03 KG/M2

## 2022-01-01 VITALS
OXYGEN SATURATION: 98 % | WEIGHT: 183.31 LBS | DIASTOLIC BLOOD PRESSURE: 82 MMHG | BODY MASS INDEX: 32.48 KG/M2 | HEART RATE: 94 BPM | SYSTOLIC BLOOD PRESSURE: 121 MMHG | HEIGHT: 63 IN

## 2022-01-01 VITALS
SYSTOLIC BLOOD PRESSURE: 118 MMHG | OXYGEN SATURATION: 100 % | WEIGHT: 154 LBS | DIASTOLIC BLOOD PRESSURE: 79 MMHG | HEIGHT: 62.99 IN | BODY MASS INDEX: 27.29 KG/M2 | HEART RATE: 73 BPM

## 2022-01-01 VITALS
DIASTOLIC BLOOD PRESSURE: 81 MMHG | HEIGHT: 63 IN | SYSTOLIC BLOOD PRESSURE: 140 MMHG | WEIGHT: 168 LBS | HEART RATE: 88 BPM | TEMPERATURE: 97.4 F | BODY MASS INDEX: 29.77 KG/M2

## 2022-01-01 VITALS
BODY MASS INDEX: 35.79 KG/M2 | DIASTOLIC BLOOD PRESSURE: 82 MMHG | WEIGHT: 202 LBS | SYSTOLIC BLOOD PRESSURE: 142 MMHG | HEIGHT: 63 IN

## 2022-01-01 DIAGNOSIS — Z51.89 ENCOUNTER FOR OTHER SPECIFIED AFTERCARE: ICD-10-CM

## 2022-01-01 DIAGNOSIS — C79.9 SECONDARY MALIGNANT NEOPLASM OF UNSPECIFIED SITE: ICD-10-CM

## 2022-01-01 DIAGNOSIS — R11.2 NAUSEA WITH VOMITING, UNSPECIFIED: ICD-10-CM

## 2022-01-01 DIAGNOSIS — Z90.710 ACQUIRED ABSENCE OF BOTH CERVIX AND UTERUS: Chronic | ICD-10-CM

## 2022-01-01 DIAGNOSIS — Z80.0 FAMILY HISTORY OF MALIGNANT NEOPLASM OF DIGESTIVE ORGANS: ICD-10-CM

## 2022-01-01 DIAGNOSIS — E86.0 DEHYDRATION: ICD-10-CM

## 2022-01-01 DIAGNOSIS — Z98.89 OTHER SPECIFIED POSTPROCEDURAL STATES: Chronic | ICD-10-CM

## 2022-01-01 DIAGNOSIS — Z13.89 ENCOUNTER FOR SCREENING FOR OTHER DISORDER: ICD-10-CM

## 2022-01-01 DIAGNOSIS — R16.0 HEPATOMEGALY, NOT ELSEWHERE CLASSIFIED: ICD-10-CM

## 2022-01-01 DIAGNOSIS — Z51.11 ENCOUNTER FOR ANTINEOPLASTIC CHEMOTHERAPY: ICD-10-CM

## 2022-01-01 DIAGNOSIS — G89.3 NEOPLASM RELATED PAIN (ACUTE) (CHRONIC): ICD-10-CM

## 2022-01-01 DIAGNOSIS — Z13.31 ENCOUNTER FOR SCREENING FOR DEPRESSION: ICD-10-CM

## 2022-01-01 DIAGNOSIS — K59.00 CONSTIPATION, UNSPECIFIED: ICD-10-CM

## 2022-01-01 DIAGNOSIS — C78.1 SECONDARY MALIGNANT NEOPLASM OF MEDIASTINUM: ICD-10-CM

## 2022-01-01 DIAGNOSIS — T45.1X5A NAUSEA WITH VOMITING, UNSPECIFIED: ICD-10-CM

## 2022-01-01 DIAGNOSIS — E78.5 HYPERLIPIDEMIA, UNSPECIFIED: ICD-10-CM

## 2022-01-01 DIAGNOSIS — C22.1 INTRAHEPATIC BILE DUCT CARCINOMA: ICD-10-CM

## 2022-01-01 DIAGNOSIS — R10.11 RIGHT UPPER QUADRANT PAIN: ICD-10-CM

## 2022-01-01 DIAGNOSIS — Z29.9 ENCOUNTER FOR PROPHYLACTIC MEASURES, UNSPECIFIED: ICD-10-CM

## 2022-01-01 DIAGNOSIS — Z85.850 PERSONAL HISTORY OF MALIGNANT NEOPLASM OF THYROID: ICD-10-CM

## 2022-01-01 DIAGNOSIS — Z00.8 ENCOUNTER FOR OTHER GENERAL EXAMINATION: ICD-10-CM

## 2022-01-01 DIAGNOSIS — Z01.818 ENCOUNTER FOR OTHER PREPROCEDURAL EXAMINATION: ICD-10-CM

## 2022-01-01 DIAGNOSIS — Z00.00 ENCOUNTER FOR GENERAL ADULT MEDICAL EXAMINATION W/OUT ABNORMAL FINDINGS: ICD-10-CM

## 2022-01-01 DIAGNOSIS — Z86.39 PERSONAL HISTORY OF OTHER ENDOCRINE, NUTRITIONAL AND METABOLIC DISEASE: ICD-10-CM

## 2022-01-01 DIAGNOSIS — R91.8 OTHER NONSPECIFIC ABNORMAL FINDING OF LUNG FIELD: ICD-10-CM

## 2022-01-01 DIAGNOSIS — C78.7 SECONDARY MALIGNANT NEOPLASM OF LIVER AND INTRAHEPATIC BILE DUCT: ICD-10-CM

## 2022-01-01 DIAGNOSIS — Z92.29 PERSONAL HISTORY OF OTHER DRUG THERAPY: ICD-10-CM

## 2022-01-01 DIAGNOSIS — M79.2 NEURALGIA AND NEURITIS, UNSPECIFIED: ICD-10-CM

## 2022-01-01 DIAGNOSIS — C24.9 MALIGNANT NEOPLASM OF BILIARY TRACT, UNSPECIFIED: ICD-10-CM

## 2022-01-01 DIAGNOSIS — D64.9 ANEMIA, UNSPECIFIED: ICD-10-CM

## 2022-01-01 DIAGNOSIS — R93.3 ABNORMAL FINDINGS ON DIAGNOSTIC IMAGING OF OTHER PARTS OF DIGESTIVE TRACT: ICD-10-CM

## 2022-01-01 DIAGNOSIS — Z79.01 LONG TERM (CURRENT) USE OF ANTICOAGULANTS: ICD-10-CM

## 2022-01-01 DIAGNOSIS — I81 PORTAL VEIN THROMBOSIS: ICD-10-CM

## 2022-01-01 DIAGNOSIS — R93.2 ABNORMAL FINDINGS ON DIAGNOSTIC IMAGING OF LIVER AND BILIARY TRACT: ICD-10-CM

## 2022-01-01 LAB
A1C WITH ESTIMATED AVERAGE GLUCOSE RESULT: 5.7 % — HIGH (ref 4–5.6)
A1C WITH ESTIMATED AVERAGE GLUCOSE RESULT: 5.7 % — HIGH (ref 4–5.6)
A1C WITH ESTIMATED AVERAGE GLUCOSE RESULT: 5.9 % — HIGH (ref 4–5.6)
ALBUMIN SERPL ELPH-MCNC: 3.7 G/DL — SIGNIFICANT CHANGE UP (ref 3.3–5)
ALBUMIN SERPL ELPH-MCNC: 3.8 G/DL — SIGNIFICANT CHANGE UP (ref 3.3–5)
ALBUMIN SERPL ELPH-MCNC: 3.9 G/DL — SIGNIFICANT CHANGE UP (ref 3.3–5)
ALBUMIN SERPL ELPH-MCNC: 4 G/DL — SIGNIFICANT CHANGE UP (ref 3.3–5)
ALBUMIN SERPL ELPH-MCNC: 4.1 G/DL
ALBUMIN SERPL ELPH-MCNC: 4.1 G/DL
ALBUMIN SERPL ELPH-MCNC: 4.1 G/DL — SIGNIFICANT CHANGE UP (ref 3.3–5)
ALBUMIN SERPL ELPH-MCNC: 4.1 G/DL — SIGNIFICANT CHANGE UP (ref 3.3–5)
ALBUMIN SERPL ELPH-MCNC: 4.2 G/DL
ALBUMIN SERPL ELPH-MCNC: 4.2 G/DL — SIGNIFICANT CHANGE UP (ref 3.3–5)
ALBUMIN SERPL ELPH-MCNC: 4.2 G/DL — SIGNIFICANT CHANGE UP (ref 3.3–5)
ALBUMIN SERPL ELPH-MCNC: 4.2 G/DL — SIGNIFICANT CHANGE UP (ref 3.3–5.2)
ALBUMIN SERPL ELPH-MCNC: 4.3 G/DL — SIGNIFICANT CHANGE UP (ref 3.3–5)
ALBUMIN SERPL ELPH-MCNC: 4.3 G/DL — SIGNIFICANT CHANGE UP (ref 3.3–5)
ALBUMIN SERPL ELPH-MCNC: 4.4 G/DL
ALBUMIN SERPL ELPH-MCNC: 4.4 G/DL
ALBUMIN SERPL ELPH-MCNC: 4.4 G/DL — SIGNIFICANT CHANGE UP (ref 3.3–5)
ALBUMIN SERPL ELPH-MCNC: 4.4 G/DL — SIGNIFICANT CHANGE UP (ref 3.3–5)
ALBUMIN SERPL ELPH-MCNC: 4.4 G/DL — SIGNIFICANT CHANGE UP (ref 3.3–5.2)
ALBUMIN SERPL ELPH-MCNC: 4.5 G/DL
ALBUMIN SERPL ELPH-MCNC: 4.5 G/DL — SIGNIFICANT CHANGE UP (ref 3.3–5)
ALBUMIN SERPL ELPH-MCNC: 4.6 G/DL
ALBUMIN SERPL ELPH-MCNC: 4.8 G/DL
ALP BLD-CCNC: 124 U/L
ALP BLD-CCNC: 134 U/L
ALP BLD-CCNC: 167 U/L
ALP BLD-CCNC: 180 U/L
ALP BLD-CCNC: 203 U/L
ALP BLD-CCNC: 209 U/L
ALP BLD-CCNC: 223 U/L
ALP BLD-CCNC: 229 U/L
ALP BLD-CCNC: 246 U/L
ALP BLD-CCNC: 266 U/L
ALP BLD-CCNC: 283 U/L
ALP BLD-CCNC: 284 U/L
ALP BLD-CCNC: 287 U/L
ALP BLD-CCNC: 380 U/L
ALP SERPL-CCNC: 113 U/L — SIGNIFICANT CHANGE UP (ref 40–120)
ALP SERPL-CCNC: 130 U/L — HIGH (ref 40–120)
ALP SERPL-CCNC: 136 U/L — HIGH (ref 40–120)
ALP SERPL-CCNC: 138 U/L — HIGH (ref 40–120)
ALP SERPL-CCNC: 141 U/L — HIGH (ref 40–120)
ALP SERPL-CCNC: 143 U/L — HIGH (ref 40–120)
ALP SERPL-CCNC: 147 U/L — HIGH (ref 40–120)
ALP SERPL-CCNC: 171 U/L — HIGH (ref 40–120)
ALP SERPL-CCNC: 179 U/L — HIGH (ref 40–120)
ALP SERPL-CCNC: 183 U/L — HIGH (ref 40–120)
ALP SERPL-CCNC: 200 U/L — HIGH (ref 40–120)
ALP SERPL-CCNC: 201 U/L — HIGH (ref 40–120)
ALP SERPL-CCNC: 207 U/L — HIGH (ref 40–120)
ALP SERPL-CCNC: 254 U/L — HIGH (ref 40–120)
ALP SERPL-CCNC: 266 U/L — HIGH (ref 40–120)
ALP SERPL-CCNC: 272 U/L — HIGH (ref 40–120)
ALP SERPL-CCNC: 291 U/L — HIGH (ref 40–120)
ALT FLD-CCNC: 11 U/L — SIGNIFICANT CHANGE UP (ref 10–45)
ALT FLD-CCNC: 12 U/L — SIGNIFICANT CHANGE UP (ref 10–45)
ALT FLD-CCNC: 13 U/L — SIGNIFICANT CHANGE UP (ref 10–45)
ALT FLD-CCNC: 14 U/L — SIGNIFICANT CHANGE UP
ALT FLD-CCNC: 14 U/L — SIGNIFICANT CHANGE UP (ref 10–45)
ALT FLD-CCNC: 16 U/L — SIGNIFICANT CHANGE UP (ref 10–45)
ALT FLD-CCNC: 17 U/L — SIGNIFICANT CHANGE UP
ALT FLD-CCNC: 17 U/L — SIGNIFICANT CHANGE UP (ref 10–45)
ALT FLD-CCNC: 25 U/L — SIGNIFICANT CHANGE UP (ref 10–45)
ALT FLD-CCNC: 35 U/L — SIGNIFICANT CHANGE UP (ref 10–45)
ALT FLD-CCNC: 35 U/L — SIGNIFICANT CHANGE UP (ref 10–45)
ALT FLD-CCNC: 37 U/L — SIGNIFICANT CHANGE UP (ref 10–45)
ALT FLD-CCNC: 53 U/L — HIGH (ref 10–45)
ALT FLD-CCNC: 54 U/L — HIGH (ref 10–45)
ALT FLD-CCNC: 7 U/L — LOW (ref 10–45)
ALT FLD-CCNC: 7 U/L — LOW (ref 10–45)
ALT FLD-CCNC: 71 U/L — HIGH (ref 10–45)
ALT SERPL-CCNC: 10 U/L
ALT SERPL-CCNC: 10 U/L
ALT SERPL-CCNC: 11 U/L
ALT SERPL-CCNC: 133 U/L
ALT SERPL-CCNC: 14 U/L
ALT SERPL-CCNC: 17 U/L
ALT SERPL-CCNC: 17 U/L
ALT SERPL-CCNC: 20 U/L
ALT SERPL-CCNC: 31 U/L
ALT SERPL-CCNC: 35 U/L
ALT SERPL-CCNC: 39 U/L
ALT SERPL-CCNC: 41 U/L
ALT SERPL-CCNC: 52 U/L
ALT SERPL-CCNC: 7 U/L
ANION GAP SERPL CALC-SCNC: 10 MMOL/L
ANION GAP SERPL CALC-SCNC: 10 MMOL/L — SIGNIFICANT CHANGE UP (ref 5–17)
ANION GAP SERPL CALC-SCNC: 11 MMOL/L
ANION GAP SERPL CALC-SCNC: 11 MMOL/L — SIGNIFICANT CHANGE UP (ref 5–17)
ANION GAP SERPL CALC-SCNC: 11 MMOL/L — SIGNIFICANT CHANGE UP (ref 5–17)
ANION GAP SERPL CALC-SCNC: 12 MMOL/L
ANION GAP SERPL CALC-SCNC: 12 MMOL/L
ANION GAP SERPL CALC-SCNC: 12 MMOL/L — SIGNIFICANT CHANGE UP (ref 5–17)
ANION GAP SERPL CALC-SCNC: 13 MMOL/L
ANION GAP SERPL CALC-SCNC: 13 MMOL/L
ANION GAP SERPL CALC-SCNC: 13 MMOL/L — SIGNIFICANT CHANGE UP (ref 5–17)
ANION GAP SERPL CALC-SCNC: 14 MMOL/L
ANION GAP SERPL CALC-SCNC: 14 MMOL/L — SIGNIFICANT CHANGE UP (ref 5–17)
ANION GAP SERPL CALC-SCNC: 14 MMOL/L — SIGNIFICANT CHANGE UP (ref 5–17)
ANION GAP SERPL CALC-SCNC: 15 MMOL/L
ANION GAP SERPL CALC-SCNC: 15 MMOL/L — SIGNIFICANT CHANGE UP (ref 5–17)
ANION GAP SERPL CALC-SCNC: 16 MMOL/L
ANION GAP SERPL CALC-SCNC: 16 MMOL/L
ANION GAP SERPL CALC-SCNC: 8 MMOL/L — SIGNIFICANT CHANGE UP (ref 5–17)
ANION GAP SERPL CALC-SCNC: 9 MMOL/L — SIGNIFICANT CHANGE UP (ref 5–17)
ANISOCYTOSIS BLD QL: SLIGHT — SIGNIFICANT CHANGE UP
APPEARANCE: CLEAR
APTT BLD: 36.8 SEC
APTT BLD: 37.7 SEC — HIGH (ref 27.5–35.5)
APTT BLD: 39.8 SEC — HIGH (ref 27.5–35.5)
APTT BLD: 41.2 SEC — HIGH (ref 27.5–35.5)
AST SERPL-CCNC: 13 U/L
AST SERPL-CCNC: 14 U/L
AST SERPL-CCNC: 15 U/L
AST SERPL-CCNC: 15 U/L
AST SERPL-CCNC: 15 U/L — SIGNIFICANT CHANGE UP (ref 10–40)
AST SERPL-CCNC: 17 U/L
AST SERPL-CCNC: 17 U/L
AST SERPL-CCNC: 21 U/L — SIGNIFICANT CHANGE UP (ref 10–40)
AST SERPL-CCNC: 22 U/L
AST SERPL-CCNC: 24 U/L — SIGNIFICANT CHANGE UP (ref 10–40)
AST SERPL-CCNC: 25 U/L — SIGNIFICANT CHANGE UP (ref 10–40)
AST SERPL-CCNC: 28 U/L — SIGNIFICANT CHANGE UP (ref 10–40)
AST SERPL-CCNC: 30 U/L
AST SERPL-CCNC: 30 U/L — SIGNIFICANT CHANGE UP
AST SERPL-CCNC: 30 U/L — SIGNIFICANT CHANGE UP (ref 10–40)
AST SERPL-CCNC: 30 U/L — SIGNIFICANT CHANGE UP (ref 10–40)
AST SERPL-CCNC: 31 U/L — SIGNIFICANT CHANGE UP
AST SERPL-CCNC: 31 U/L — SIGNIFICANT CHANGE UP (ref 10–40)
AST SERPL-CCNC: 38 U/L — SIGNIFICANT CHANGE UP (ref 10–40)
AST SERPL-CCNC: 42 U/L
AST SERPL-CCNC: 42 U/L — HIGH (ref 10–40)
AST SERPL-CCNC: 49 U/L
AST SERPL-CCNC: 53 U/L — HIGH (ref 10–40)
AST SERPL-CCNC: 53 U/L — HIGH (ref 10–40)
AST SERPL-CCNC: 54 U/L
AST SERPL-CCNC: 54 U/L — HIGH (ref 10–40)
AST SERPL-CCNC: 57 U/L — HIGH (ref 10–40)
AST SERPL-CCNC: 60 U/L
AST SERPL-CCNC: 65 U/L — HIGH (ref 10–40)
AST SERPL-CCNC: 70 U/L
AST SERPL-CCNC: 84 U/L
BASOPHILS # BLD AUTO: 0 K/UL — SIGNIFICANT CHANGE UP (ref 0–0.2)
BASOPHILS # BLD AUTO: 0.06 K/UL — SIGNIFICANT CHANGE UP (ref 0–0.2)
BASOPHILS # BLD AUTO: 0.07 K/UL — SIGNIFICANT CHANGE UP (ref 0–0.2)
BASOPHILS # BLD AUTO: 0.1 K/UL — SIGNIFICANT CHANGE UP (ref 0–0.2)
BASOPHILS # BLD AUTO: 0.2 K/UL — SIGNIFICANT CHANGE UP (ref 0–0.2)
BASOPHILS # BLD AUTO: SIGNIFICANT CHANGE UP K/UL (ref 0–0.2)
BASOPHILS NFR BLD AUTO: 0.3 % — SIGNIFICANT CHANGE UP (ref 0–2)
BASOPHILS NFR BLD AUTO: 0.5 % — SIGNIFICANT CHANGE UP (ref 0–2)
BASOPHILS NFR BLD AUTO: 0.7 % — SIGNIFICANT CHANGE UP (ref 0–2)
BASOPHILS NFR BLD AUTO: 0.8 % — SIGNIFICANT CHANGE UP (ref 0–2)
BASOPHILS NFR BLD AUTO: 0.9 % — SIGNIFICANT CHANGE UP (ref 0–2)
BASOPHILS NFR BLD AUTO: 1 % — SIGNIFICANT CHANGE UP (ref 0–2)
BASOPHILS NFR BLD AUTO: 1.1 % — SIGNIFICANT CHANGE UP (ref 0–2)
BASOPHILS NFR BLD AUTO: 1.1 % — SIGNIFICANT CHANGE UP (ref 0–2)
BASOPHILS NFR BLD AUTO: 1.2 % — SIGNIFICANT CHANGE UP (ref 0–2)
BASOPHILS NFR BLD AUTO: 1.2 % — SIGNIFICANT CHANGE UP (ref 0–2)
BASOPHILS NFR BLD AUTO: 1.3 % — SIGNIFICANT CHANGE UP (ref 0–2)
BASOPHILS NFR BLD AUTO: 1.3 % — SIGNIFICANT CHANGE UP (ref 0–2)
BASOPHILS NFR BLD AUTO: 1.4 % — SIGNIFICANT CHANGE UP (ref 0–2)
BASOPHILS NFR BLD AUTO: 1.4 % — SIGNIFICANT CHANGE UP (ref 0–2)
BASOPHILS NFR BLD AUTO: 1.5 % — SIGNIFICANT CHANGE UP (ref 0–2)
BASOPHILS NFR BLD AUTO: 3 % — HIGH (ref 0–2)
BASOPHILS NFR BLD AUTO: SIGNIFICANT CHANGE UP % (ref 0–2)
BILIRUB SERPL-MCNC: 0.2 MG/DL — SIGNIFICANT CHANGE UP (ref 0.2–1.2)
BILIRUB SERPL-MCNC: 0.3 MG/DL
BILIRUB SERPL-MCNC: 0.3 MG/DL — SIGNIFICANT CHANGE UP (ref 0.2–1.2)
BILIRUB SERPL-MCNC: 0.4 MG/DL
BILIRUB SERPL-MCNC: 0.4 MG/DL — SIGNIFICANT CHANGE UP (ref 0.2–1.2)
BILIRUB SERPL-MCNC: 0.4 MG/DL — SIGNIFICANT CHANGE UP (ref 0.4–2)
BILIRUB SERPL-MCNC: 0.5 MG/DL
BILIRUB SERPL-MCNC: 0.5 MG/DL — SIGNIFICANT CHANGE UP (ref 0.2–1.2)
BILIRUB SERPL-MCNC: 0.5 MG/DL — SIGNIFICANT CHANGE UP (ref 0.2–1.2)
BILIRUB SERPL-MCNC: 0.6 MG/DL
BILIRUB SERPL-MCNC: 0.6 MG/DL
BILIRUB SERPL-MCNC: 0.7 MG/DL
BILIRUB SERPL-MCNC: 0.7 MG/DL
BILIRUB SERPL-MCNC: 0.7 MG/DL — SIGNIFICANT CHANGE UP (ref 0.4–2)
BILIRUB SERPL-MCNC: 0.9 MG/DL
BILIRUB SERPL-MCNC: <0.2 MG/DL
BILIRUBIN URINE: NEGATIVE
BLOOD URINE: NEGATIVE
BUN SERPL-MCNC: 10 MG/DL — SIGNIFICANT CHANGE UP (ref 7–23)
BUN SERPL-MCNC: 10 MG/DL — SIGNIFICANT CHANGE UP (ref 7–23)
BUN SERPL-MCNC: 11 MG/DL
BUN SERPL-MCNC: 12 MG/DL
BUN SERPL-MCNC: 12 MG/DL — SIGNIFICANT CHANGE UP (ref 7–23)
BUN SERPL-MCNC: 13 MG/DL
BUN SERPL-MCNC: 14 MG/DL
BUN SERPL-MCNC: 15 MG/DL
BUN SERPL-MCNC: 15 MG/DL — SIGNIFICANT CHANGE UP (ref 7–23)
BUN SERPL-MCNC: 15 MG/DL — SIGNIFICANT CHANGE UP (ref 7–23)
BUN SERPL-MCNC: 16 MG/DL — SIGNIFICANT CHANGE UP (ref 7–23)
BUN SERPL-MCNC: 17 MG/DL
BUN SERPL-MCNC: 17 MG/DL — SIGNIFICANT CHANGE UP (ref 7–23)
BUN SERPL-MCNC: 18 MG/DL — SIGNIFICANT CHANGE UP (ref 7–23)
BUN SERPL-MCNC: 20 MG/DL
BUN SERPL-MCNC: 5 MG/DL
BUN SERPL-MCNC: 5 MG/DL — LOW (ref 7–23)
BUN SERPL-MCNC: 5.5 MG/DL — LOW (ref 8–20)
BUN SERPL-MCNC: 5.6 MG/DL — LOW (ref 8–20)
BUN SERPL-MCNC: 6 MG/DL
BUN SERPL-MCNC: 6 MG/DL — LOW (ref 7–23)
BUN SERPL-MCNC: 6.2 MG/DL — LOW (ref 8–20)
BUN SERPL-MCNC: 7 MG/DL
BUN SERPL-MCNC: 8 MG/DL
BUN SERPL-MCNC: 8 MG/DL
BUN SERPL-MCNC: 8 MG/DL — SIGNIFICANT CHANGE UP (ref 7–23)
BUN SERPL-MCNC: 9 MG/DL
BUN SERPL-MCNC: 9 MG/DL
BUN SERPL-MCNC: 9 MG/DL — SIGNIFICANT CHANGE UP (ref 7–23)
CALCIUM SERPL-MCNC: 10 MG/DL
CALCIUM SERPL-MCNC: 10 MG/DL — SIGNIFICANT CHANGE UP (ref 8.6–10.2)
CALCIUM SERPL-MCNC: 10.1 MG/DL
CALCIUM SERPL-MCNC: 10.1 MG/DL
CALCIUM SERPL-MCNC: 10.1 MG/DL — SIGNIFICANT CHANGE UP (ref 8.4–10.5)
CALCIUM SERPL-MCNC: 10.1 MG/DL — SIGNIFICANT CHANGE UP (ref 8.4–10.5)
CALCIUM SERPL-MCNC: 10.2 MG/DL — SIGNIFICANT CHANGE UP (ref 8.4–10.5)
CALCIUM SERPL-MCNC: 10.3 MG/DL
CALCIUM SERPL-MCNC: 10.3 MG/DL — SIGNIFICANT CHANGE UP (ref 8.4–10.5)
CALCIUM SERPL-MCNC: 10.4 MG/DL
CALCIUM SERPL-MCNC: 10.4 MG/DL — SIGNIFICANT CHANGE UP (ref 8.4–10.5)
CALCIUM SERPL-MCNC: 10.5 MG/DL
CALCIUM SERPL-MCNC: 10.5 MG/DL — SIGNIFICANT CHANGE UP (ref 8.4–10.5)
CALCIUM SERPL-MCNC: 10.6 MG/DL — HIGH (ref 8.4–10.5)
CALCIUM SERPL-MCNC: 10.8 MG/DL
CALCIUM SERPL-MCNC: 10.8 MG/DL — HIGH (ref 8.4–10.5)
CALCIUM SERPL-MCNC: 10.9 MG/DL
CALCIUM SERPL-MCNC: 11 MG/DL — HIGH (ref 8.4–10.5)
CALCIUM SERPL-MCNC: 11 MG/DL — HIGH (ref 8.4–10.5)
CALCIUM SERPL-MCNC: 9.3 MG/DL — SIGNIFICANT CHANGE UP (ref 8.4–10.5)
CALCIUM SERPL-MCNC: 9.6 MG/DL — SIGNIFICANT CHANGE UP (ref 8.6–10.2)
CALCIUM SERPL-MCNC: 9.6 MG/DL — SIGNIFICANT CHANGE UP (ref 8.6–10.2)
CALCIUM SERPL-MCNC: 9.9 MG/DL
CANCER AG125 SERPL-ACNC: 142 U/ML
CANCER AG125 SERPL-ACNC: 235 U/ML
CANCER AG19-9 SERPL-ACNC: 231 U/ML
CANCER AG19-9 SERPL-ACNC: 254 U/ML
CANCER AG19-9 SERPL-ACNC: 441 U/ML
CANCER AG19-9 SERPL-ACNC: 92 U/ML
CEA SERPL-MCNC: 1.5 NG/ML
CEA SERPL-MCNC: 1.6 NG/ML
CHLORIDE SERPL-SCNC: 100 MMOL/L
CHLORIDE SERPL-SCNC: 100 MMOL/L — SIGNIFICANT CHANGE UP (ref 96–108)
CHLORIDE SERPL-SCNC: 100 MMOL/L — SIGNIFICANT CHANGE UP (ref 98–107)
CHLORIDE SERPL-SCNC: 101 MMOL/L — SIGNIFICANT CHANGE UP (ref 96–108)
CHLORIDE SERPL-SCNC: 102 MMOL/L — SIGNIFICANT CHANGE UP (ref 96–108)
CHLORIDE SERPL-SCNC: 102 MMOL/L — SIGNIFICANT CHANGE UP (ref 98–107)
CHLORIDE SERPL-SCNC: 103 MMOL/L
CHLORIDE SERPL-SCNC: 103 MMOL/L — SIGNIFICANT CHANGE UP (ref 96–108)
CHLORIDE SERPL-SCNC: 103 MMOL/L — SIGNIFICANT CHANGE UP (ref 98–107)
CHLORIDE SERPL-SCNC: 104 MMOL/L — SIGNIFICANT CHANGE UP (ref 96–108)
CHLORIDE SERPL-SCNC: 104 MMOL/L — SIGNIFICANT CHANGE UP (ref 96–108)
CHLORIDE SERPL-SCNC: 106 MMOL/L — SIGNIFICANT CHANGE UP (ref 96–108)
CHLORIDE SERPL-SCNC: 94 MMOL/L
CHLORIDE SERPL-SCNC: 95 MMOL/L
CHLORIDE SERPL-SCNC: 95 MMOL/L
CHLORIDE SERPL-SCNC: 95 MMOL/L — LOW (ref 96–108)
CHLORIDE SERPL-SCNC: 96 MMOL/L
CHLORIDE SERPL-SCNC: 97 MMOL/L
CHLORIDE SERPL-SCNC: 97 MMOL/L
CHLORIDE SERPL-SCNC: 97 MMOL/L — SIGNIFICANT CHANGE UP (ref 96–108)
CHLORIDE SERPL-SCNC: 98 MMOL/L
CHLORIDE SERPL-SCNC: 98 MMOL/L — SIGNIFICANT CHANGE UP (ref 96–108)
CHLORIDE SERPL-SCNC: 99 MMOL/L — SIGNIFICANT CHANGE UP (ref 96–108)
CHOLEST SERPL-MCNC: 161 MG/DL — SIGNIFICANT CHANGE UP
CHOLEST SERPL-MCNC: 167 MG/DL — SIGNIFICANT CHANGE UP
CHOLEST SERPL-MCNC: 234 MG/DL
CO2 SERPL-SCNC: 21 MMOL/L — LOW (ref 22–31)
CO2 SERPL-SCNC: 22 MMOL/L
CO2 SERPL-SCNC: 22 MMOL/L — SIGNIFICANT CHANGE UP (ref 22–31)
CO2 SERPL-SCNC: 24 MMOL/L
CO2 SERPL-SCNC: 24 MMOL/L — SIGNIFICANT CHANGE UP (ref 22–31)
CO2 SERPL-SCNC: 25 MMOL/L
CO2 SERPL-SCNC: 25 MMOL/L — SIGNIFICANT CHANGE UP (ref 22–29)
CO2 SERPL-SCNC: 25 MMOL/L — SIGNIFICANT CHANGE UP (ref 22–31)
CO2 SERPL-SCNC: 25 MMOL/L — SIGNIFICANT CHANGE UP (ref 22–31)
CO2 SERPL-SCNC: 26 MMOL/L
CO2 SERPL-SCNC: 26 MMOL/L — SIGNIFICANT CHANGE UP (ref 22–29)
CO2 SERPL-SCNC: 26 MMOL/L — SIGNIFICANT CHANGE UP (ref 22–31)
CO2 SERPL-SCNC: 27 MMOL/L
CO2 SERPL-SCNC: 27 MMOL/L — SIGNIFICANT CHANGE UP (ref 22–29)
CO2 SERPL-SCNC: 27 MMOL/L — SIGNIFICANT CHANGE UP (ref 22–31)
CO2 SERPL-SCNC: 28 MMOL/L
CO2 SERPL-SCNC: 28 MMOL/L
CO2 SERPL-SCNC: 28 MMOL/L — SIGNIFICANT CHANGE UP (ref 22–31)
CO2 SERPL-SCNC: 28 MMOL/L — SIGNIFICANT CHANGE UP (ref 22–31)
CO2 SERPL-SCNC: 30 MMOL/L
COLOR: COLORLESS
CORTICOSTEROID BINDING GLOBULIN RESULT: 1.9 MG/DL — SIGNIFICANT CHANGE UP
CORTICOSTEROID BINDING GLOBULIN RESULT: 1.9 MG/DL — SIGNIFICANT CHANGE UP
CORTICOSTEROID BINDING GLOBULIN RESULT: 2 MG/DL — SIGNIFICANT CHANGE UP
CORTICOSTEROID BINDING GLOBULIN RESULT: 2.1 MG/DL — SIGNIFICANT CHANGE UP
CORTICOSTEROID BINDING GLOBULIN RESULT: 2.4 MG/DL — SIGNIFICANT CHANGE UP
CORTICOSTEROID BINDING GLOBULIN RESULT: 2.5 MG/DL — SIGNIFICANT CHANGE UP
CORTICOSTEROID BINDING GLOBULIN RESULT: 2.7 MG/DL — SIGNIFICANT CHANGE UP
CORTICOSTEROID BINDING GLOBULIN RESULT: 2.8 MG/DL — SIGNIFICANT CHANGE UP
CORTICOSTEROID BINDING GLOBULIN RESULT: 3 MG/DL — SIGNIFICANT CHANGE UP
CORTICOSTEROID BINDING GLOBULIN RESULT: 3 MG/DL — SIGNIFICANT CHANGE UP
CORTICOSTEROID BINDING GLOBULIN RESULT: 4.5 MG/DL — HIGH
CORTIS F/TOTAL MFR SERPL: 1.8 % — SIGNIFICANT CHANGE UP
CORTIS F/TOTAL MFR SERPL: 10 % — SIGNIFICANT CHANGE UP
CORTIS F/TOTAL MFR SERPL: 11 % — SIGNIFICANT CHANGE UP
CORTIS F/TOTAL MFR SERPL: 18 % — SIGNIFICANT CHANGE UP
CORTIS F/TOTAL MFR SERPL: 2.4 % — SIGNIFICANT CHANGE UP
CORTIS F/TOTAL MFR SERPL: 2.9 % — SIGNIFICANT CHANGE UP
CORTIS F/TOTAL MFR SERPL: 5.4 % — SIGNIFICANT CHANGE UP
CORTIS F/TOTAL MFR SERPL: 6.4 % — SIGNIFICANT CHANGE UP
CORTIS F/TOTAL MFR SERPL: 7.1 % — SIGNIFICANT CHANGE UP
CORTIS F/TOTAL MFR SERPL: 8.4 % — SIGNIFICANT CHANGE UP
CORTIS F/TOTAL MFR SERPL: 9.5 % — SIGNIFICANT CHANGE UP
CORTIS SERPL-MCNC: 11 UG/DL — SIGNIFICANT CHANGE UP
CORTIS SERPL-MCNC: 12 UG/DL — SIGNIFICANT CHANGE UP
CORTIS SERPL-MCNC: 14 UG/DL — SIGNIFICANT CHANGE UP
CORTIS SERPL-MCNC: 16 UG/DL — SIGNIFICANT CHANGE UP
CORTIS SERPL-MCNC: 16 UG/DL — SIGNIFICANT CHANGE UP
CORTIS SERPL-MCNC: 18 UG/DL — SIGNIFICANT CHANGE UP
CORTIS SERPL-MCNC: 19 UG/DL — SIGNIFICANT CHANGE UP
CORTIS SERPL-MCNC: 2.4 UG/DL — LOW
CORTIS SERPL-MCNC: 5.8 UG/DL — SIGNIFICANT CHANGE UP
CORTIS SERPL-MCNC: 7.9 UG/DL — SIGNIFICANT CHANGE UP
CORTIS SERPL-MCNC: 9 UG/DL — SIGNIFICANT CHANGE UP
CORTISOL, FREE RESULT: 0.07 UG/DL — LOW
CORTISOL, FREE RESULT: 0.14 UG/DL — LOW
CORTISOL, FREE RESULT: 0.19 UG/DL — LOW
CORTISOL, FREE RESULT: 0.43 UG/DL — SIGNIFICANT CHANGE UP
CORTISOL, FREE RESULT: 0.64 UG/DL — SIGNIFICANT CHANGE UP
CORTISOL, FREE RESULT: 0.77 UG/DL — SIGNIFICANT CHANGE UP
CORTISOL, FREE RESULT: 1.3 UG/DL — SIGNIFICANT CHANGE UP
CORTISOL, FREE RESULT: 1.7 UG/DL — SIGNIFICANT CHANGE UP
CORTISOL, FREE RESULT: 1.8 UG/DL — SIGNIFICANT CHANGE UP
CORTISOL, FREE RESULT: 1.9 UG/DL — HIGH
CORTISOL, FREE RESULT: 2.5 UG/DL — HIGH
CREAT SERPL-MCNC: 0.46 MG/DL — LOW (ref 0.5–1.3)
CREAT SERPL-MCNC: 0.47 MG/DL — LOW (ref 0.5–1.3)
CREAT SERPL-MCNC: 0.51 MG/DL — SIGNIFICANT CHANGE UP (ref 0.5–1.3)
CREAT SERPL-MCNC: 0.53 MG/DL — SIGNIFICANT CHANGE UP (ref 0.5–1.3)
CREAT SERPL-MCNC: 0.53 MG/DL — SIGNIFICANT CHANGE UP (ref 0.5–1.3)
CREAT SERPL-MCNC: 0.55 MG/DL — SIGNIFICANT CHANGE UP (ref 0.5–1.3)
CREAT SERPL-MCNC: 0.57 MG/DL
CREAT SERPL-MCNC: 0.57 MG/DL — SIGNIFICANT CHANGE UP (ref 0.5–1.3)
CREAT SERPL-MCNC: 0.58 MG/DL
CREAT SERPL-MCNC: 0.64 MG/DL
CREAT SERPL-MCNC: 0.64 MG/DL
CREAT SERPL-MCNC: 0.65 MG/DL
CREAT SERPL-MCNC: 0.68 MG/DL — SIGNIFICANT CHANGE UP (ref 0.5–1.3)
CREAT SERPL-MCNC: 0.69 MG/DL
CREAT SERPL-MCNC: 0.73 MG/DL — SIGNIFICANT CHANGE UP (ref 0.5–1.3)
CREAT SERPL-MCNC: 0.75 MG/DL
CREAT SERPL-MCNC: 0.79 MG/DL — SIGNIFICANT CHANGE UP (ref 0.5–1.3)
CREAT SERPL-MCNC: 0.81 MG/DL
CREAT SERPL-MCNC: 0.81 MG/DL — SIGNIFICANT CHANGE UP (ref 0.5–1.3)
CREAT SERPL-MCNC: 0.84 MG/DL
CREAT SERPL-MCNC: 0.84 MG/DL — SIGNIFICANT CHANGE UP (ref 0.5–1.3)
CREAT SERPL-MCNC: 0.85 MG/DL
CREAT SERPL-MCNC: 0.86 MG/DL — SIGNIFICANT CHANGE UP (ref 0.5–1.3)
CREAT SERPL-MCNC: 0.87 MG/DL
CREAT SERPL-MCNC: 0.87 MG/DL — SIGNIFICANT CHANGE UP (ref 0.5–1.3)
CREAT SERPL-MCNC: 0.88 MG/DL — SIGNIFICANT CHANGE UP (ref 0.5–1.3)
CREAT SERPL-MCNC: 0.91 MG/DL
CREAT SERPL-MCNC: 0.92 MG/DL
CREAT SERPL-MCNC: 0.93 MG/DL
CREAT SERPL-MCNC: 0.93 MG/DL — SIGNIFICANT CHANGE UP (ref 0.5–1.3)
DACRYOCYTES BLD QL SMEAR: SLIGHT — SIGNIFICANT CHANGE UP
DACRYOCYTES BLD QL SMEAR: SLIGHT — SIGNIFICANT CHANGE UP
DOHLE BOD BLD QL SMEAR: PRESENT — SIGNIFICANT CHANGE UP
EGFR: 102 ML/MIN/1.73M2
EGFR: 102 ML/MIN/1.73M2 — SIGNIFICANT CHANGE UP
EGFR: 103 ML/MIN/1.73M2
EGFR: 104 ML/MIN/1.73M2
EGFR: 104 ML/MIN/1.73M2
EGFR: 106 ML/MIN/1.73M2
EGFR: 107 ML/MIN/1.73M2 — SIGNIFICANT CHANGE UP
EGFR: 108 ML/MIN/1.73M2 — SIGNIFICANT CHANGE UP
EGFR: 109 ML/MIN/1.73M2 — SIGNIFICANT CHANGE UP
EGFR: 112 ML/MIN/1.73M2 — SIGNIFICANT CHANGE UP
EGFR: 72 ML/MIN/1.73M2
EGFR: 72 ML/MIN/1.73M2 — SIGNIFICANT CHANGE UP
EGFR: 73 ML/MIN/1.73M2
EGFR: 74 ML/MIN/1.73M2
EGFR: 77 ML/MIN/1.73M2 — SIGNIFICANT CHANGE UP
EGFR: 78 ML/MIN/1.73M2
EGFR: 78 ML/MIN/1.73M2 — SIGNIFICANT CHANGE UP
EGFR: 79 ML/MIN/1.73M2 — SIGNIFICANT CHANGE UP
EGFR: 80 ML/MIN/1.73M2
EGFR: 82 ML/MIN/1.73M2
EGFR: 82 ML/MIN/1.73M2 — SIGNIFICANT CHANGE UP
EGFR: 85 ML/MIN/1.73M2
EGFR: 85 ML/MIN/1.73M2 — SIGNIFICANT CHANGE UP
EGFR: 88 ML/MIN/1.73M2 — SIGNIFICANT CHANGE UP
EGFR: 93 ML/MIN/1.73M2
EGFR: 96 ML/MIN/1.73M2 — SIGNIFICANT CHANGE UP
ELLIPTOCYTES BLD QL SMEAR: SLIGHT — SIGNIFICANT CHANGE UP
EOSINOPHIL # BLD AUTO: 0 K/UL — SIGNIFICANT CHANGE UP (ref 0–0.5)
EOSINOPHIL # BLD AUTO: 0.1 K/UL — SIGNIFICANT CHANGE UP (ref 0–0.5)
EOSINOPHIL # BLD AUTO: 0.2 K/UL — SIGNIFICANT CHANGE UP (ref 0–0.5)
EOSINOPHIL # BLD AUTO: 0.2 K/UL — SIGNIFICANT CHANGE UP (ref 0–0.5)
EOSINOPHIL # BLD AUTO: 0.3 K/UL — SIGNIFICANT CHANGE UP (ref 0–0.5)
EOSINOPHIL # BLD AUTO: 0.34 K/UL — SIGNIFICANT CHANGE UP (ref 0–0.5)
EOSINOPHIL # BLD AUTO: 0.35 K/UL — SIGNIFICANT CHANGE UP (ref 0–0.5)
EOSINOPHIL # BLD AUTO: 0.4 K/UL — SIGNIFICANT CHANGE UP (ref 0–0.5)
EOSINOPHIL # BLD AUTO: 0.4 K/UL — SIGNIFICANT CHANGE UP (ref 0–0.5)
EOSINOPHIL # BLD AUTO: 0.7 K/UL — HIGH (ref 0–0.5)
EOSINOPHIL # BLD AUTO: SIGNIFICANT CHANGE UP K/UL (ref 0–0.5)
EOSINOPHIL NFR BLD AUTO: 0.2 % — SIGNIFICANT CHANGE UP (ref 0–6)
EOSINOPHIL NFR BLD AUTO: 0.3 % — SIGNIFICANT CHANGE UP (ref 0–6)
EOSINOPHIL NFR BLD AUTO: 0.3 % — SIGNIFICANT CHANGE UP (ref 0–6)
EOSINOPHIL NFR BLD AUTO: 0.5 % — SIGNIFICANT CHANGE UP (ref 0–6)
EOSINOPHIL NFR BLD AUTO: 0.6 % — SIGNIFICANT CHANGE UP (ref 0–6)
EOSINOPHIL NFR BLD AUTO: 0.7 % — SIGNIFICANT CHANGE UP (ref 0–6)
EOSINOPHIL NFR BLD AUTO: 0.8 % — SIGNIFICANT CHANGE UP (ref 0–6)
EOSINOPHIL NFR BLD AUTO: 0.8 % — SIGNIFICANT CHANGE UP (ref 0–6)
EOSINOPHIL NFR BLD AUTO: 1.2 % — SIGNIFICANT CHANGE UP (ref 0–6)
EOSINOPHIL NFR BLD AUTO: 1.5 % — SIGNIFICANT CHANGE UP (ref 0–6)
EOSINOPHIL NFR BLD AUTO: 1.6 % — SIGNIFICANT CHANGE UP (ref 0–6)
EOSINOPHIL NFR BLD AUTO: 11.2 % — HIGH (ref 0–6)
EOSINOPHIL NFR BLD AUTO: 2 % — SIGNIFICANT CHANGE UP (ref 0–6)
EOSINOPHIL NFR BLD AUTO: 2.2 % — SIGNIFICANT CHANGE UP (ref 0–6)
EOSINOPHIL NFR BLD AUTO: 2.5 % — SIGNIFICANT CHANGE UP (ref 0–6)
EOSINOPHIL NFR BLD AUTO: 2.8 % — SIGNIFICANT CHANGE UP (ref 0–6)
EOSINOPHIL NFR BLD AUTO: 3.4 % — SIGNIFICANT CHANGE UP (ref 0–6)
EOSINOPHIL NFR BLD AUTO: 4.1 % — SIGNIFICANT CHANGE UP (ref 0–6)
EOSINOPHIL NFR BLD AUTO: 4.1 % — SIGNIFICANT CHANGE UP (ref 0–6)
EOSINOPHIL NFR BLD AUTO: 4.4 % — SIGNIFICANT CHANGE UP (ref 0–6)
EOSINOPHIL NFR BLD AUTO: 4.4 % — SIGNIFICANT CHANGE UP (ref 0–6)
EOSINOPHIL NFR BLD AUTO: 5.7 % — SIGNIFICANT CHANGE UP (ref 0–6)
EOSINOPHIL NFR BLD AUTO: 6 % — SIGNIFICANT CHANGE UP (ref 0–6)
EOSINOPHIL NFR BLD AUTO: SIGNIFICANT CHANGE UP % (ref 0–6)
ESTIMATED AVERAGE GLUCOSE: 117 MG/DL — HIGH (ref 68–114)
ESTIMATED AVERAGE GLUCOSE: 117 MG/DL — HIGH (ref 68–114)
ESTIMATED AVERAGE GLUCOSE: 123 MG/DL — HIGH (ref 68–114)
FLUAV AG NPH QL: SIGNIFICANT CHANGE UP
FLUBV AG NPH QL: SIGNIFICANT CHANGE UP
GIANT PLATELETS BLD QL SMEAR: PRESENT — SIGNIFICANT CHANGE UP
GLUCOSE QUALITATIVE U: NEGATIVE
GLUCOSE SERPL-MCNC: 100 MG/DL — HIGH (ref 70–99)
GLUCOSE SERPL-MCNC: 102 MG/DL — HIGH (ref 70–99)
GLUCOSE SERPL-MCNC: 103 MG/DL — HIGH (ref 70–99)
GLUCOSE SERPL-MCNC: 105 MG/DL
GLUCOSE SERPL-MCNC: 105 MG/DL
GLUCOSE SERPL-MCNC: 106 MG/DL — HIGH (ref 70–99)
GLUCOSE SERPL-MCNC: 108 MG/DL
GLUCOSE SERPL-MCNC: 108 MG/DL — HIGH (ref 70–99)
GLUCOSE SERPL-MCNC: 109 MG/DL — HIGH (ref 70–99)
GLUCOSE SERPL-MCNC: 110 MG/DL
GLUCOSE SERPL-MCNC: 110 MG/DL — HIGH (ref 70–99)
GLUCOSE SERPL-MCNC: 112 MG/DL — HIGH (ref 70–99)
GLUCOSE SERPL-MCNC: 115 MG/DL — HIGH (ref 70–99)
GLUCOSE SERPL-MCNC: 127 MG/DL — HIGH (ref 70–99)
GLUCOSE SERPL-MCNC: 136 MG/DL
GLUCOSE SERPL-MCNC: 75 MG/DL — SIGNIFICANT CHANGE UP (ref 70–99)
GLUCOSE SERPL-MCNC: 81 MG/DL
GLUCOSE SERPL-MCNC: 81 MG/DL — SIGNIFICANT CHANGE UP (ref 70–99)
GLUCOSE SERPL-MCNC: 81 MG/DL — SIGNIFICANT CHANGE UP (ref 70–99)
GLUCOSE SERPL-MCNC: 82 MG/DL
GLUCOSE SERPL-MCNC: 85 MG/DL — SIGNIFICANT CHANGE UP (ref 70–99)
GLUCOSE SERPL-MCNC: 89 MG/DL
GLUCOSE SERPL-MCNC: 89 MG/DL — SIGNIFICANT CHANGE UP (ref 70–99)
GLUCOSE SERPL-MCNC: 90 MG/DL — SIGNIFICANT CHANGE UP (ref 70–99)
GLUCOSE SERPL-MCNC: 91 MG/DL
GLUCOSE SERPL-MCNC: 92 MG/DL — SIGNIFICANT CHANGE UP (ref 70–99)
GLUCOSE SERPL-MCNC: 95 MG/DL — SIGNIFICANT CHANGE UP (ref 70–99)
GLUCOSE SERPL-MCNC: 98 MG/DL
GLUCOSE SERPL-MCNC: 99 MG/DL
HAV IGM SER QL: NONREACTIVE
HBV CORE IGM SER QL: NONREACTIVE
HBV SURFACE AG SER QL: NONREACTIVE
HCT VFR BLD CALC: 24.8 % — LOW (ref 34.5–45)
HCT VFR BLD CALC: 25 % — LOW (ref 34.5–45)
HCT VFR BLD CALC: 25.2 % — LOW (ref 34.5–45)
HCT VFR BLD CALC: 25.6 % — LOW (ref 34.5–45)
HCT VFR BLD CALC: 26.1 % — LOW (ref 34.5–45)
HCT VFR BLD CALC: 26.6 % — LOW (ref 34.5–45)
HCT VFR BLD CALC: 28 % — LOW (ref 34.5–45)
HCT VFR BLD CALC: 28.2 % — LOW (ref 34.5–45)
HCT VFR BLD CALC: 28.6 % — LOW (ref 34.5–45)
HCT VFR BLD CALC: 29.1 % — LOW (ref 34.5–45)
HCT VFR BLD CALC: 30.3 % — LOW (ref 34.5–45)
HCT VFR BLD CALC: 30.7 % — LOW (ref 34.5–45)
HCT VFR BLD CALC: 31.4 % — LOW (ref 34.5–45)
HCT VFR BLD CALC: 33.5 % — LOW (ref 34.5–45)
HCT VFR BLD CALC: 33.5 % — LOW (ref 34.5–45)
HCT VFR BLD CALC: 36.8 % — SIGNIFICANT CHANGE UP (ref 34.5–45)
HCT VFR BLD CALC: 37.3 % — SIGNIFICANT CHANGE UP (ref 34.5–45)
HCT VFR BLD CALC: 37.4 % — SIGNIFICANT CHANGE UP (ref 34.5–45)
HCT VFR BLD CALC: 38 % — SIGNIFICANT CHANGE UP (ref 34.5–45)
HCT VFR BLD CALC: 38.8 % — SIGNIFICANT CHANGE UP (ref 34.5–45)
HCT VFR BLD CALC: 39.2 % — SIGNIFICANT CHANGE UP (ref 34.5–45)
HCT VFR BLD CALC: 39.8 % — SIGNIFICANT CHANGE UP (ref 34.5–45)
HCT VFR BLD CALC: 40.1 % — SIGNIFICANT CHANGE UP (ref 34.5–45)
HCT VFR BLD CALC: 40.4 % — SIGNIFICANT CHANGE UP (ref 34.5–45)
HCT VFR BLD CALC: 41.6 % — SIGNIFICANT CHANGE UP (ref 34.5–45)
HCT VFR BLD CALC: 41.8 % — SIGNIFICANT CHANGE UP (ref 34.5–45)
HCT VFR BLD CALC: 43.4 % — SIGNIFICANT CHANGE UP (ref 34.5–45)
HCT VFR BLD CALC: 44.6 % — SIGNIFICANT CHANGE UP (ref 34.5–45)
HCV AB SER QL: NONREACTIVE
HCV S/CO RATIO: 0.14 S/CO
HDLC SERPL-MCNC: 34 MG/DL — LOW
HDLC SERPL-MCNC: 35 MG/DL — LOW
HDLC SERPL-MCNC: 75 MG/DL
HGB BLD-MCNC: 10.4 G/DL — LOW (ref 11.5–15.5)
HGB BLD-MCNC: 10.6 G/DL — LOW (ref 11.5–15.5)
HGB BLD-MCNC: 11.5 G/DL — SIGNIFICANT CHANGE UP (ref 11.5–15.5)
HGB BLD-MCNC: 11.8 G/DL — SIGNIFICANT CHANGE UP (ref 11.5–15.5)
HGB BLD-MCNC: 11.8 G/DL — SIGNIFICANT CHANGE UP (ref 11.5–15.5)
HGB BLD-MCNC: 12 G/DL — SIGNIFICANT CHANGE UP (ref 11.5–15.5)
HGB BLD-MCNC: 12.2 G/DL — SIGNIFICANT CHANGE UP (ref 11.5–15.5)
HGB BLD-MCNC: 12.3 G/DL — SIGNIFICANT CHANGE UP (ref 11.5–15.5)
HGB BLD-MCNC: 12.3 G/DL — SIGNIFICANT CHANGE UP (ref 11.5–15.5)
HGB BLD-MCNC: 12.4 G/DL — SIGNIFICANT CHANGE UP (ref 11.5–15.5)
HGB BLD-MCNC: 12.7 G/DL — SIGNIFICANT CHANGE UP (ref 11.5–15.5)
HGB BLD-MCNC: 12.8 G/DL — SIGNIFICANT CHANGE UP (ref 11.5–15.5)
HGB BLD-MCNC: 13.1 G/DL — SIGNIFICANT CHANGE UP (ref 11.5–15.5)
HGB BLD-MCNC: 13.4 G/DL — SIGNIFICANT CHANGE UP (ref 11.5–15.5)
HGB BLD-MCNC: 13.6 G/DL — SIGNIFICANT CHANGE UP (ref 11.5–15.5)
HGB BLD-MCNC: 7.8 G/DL — LOW (ref 11.5–15.5)
HGB BLD-MCNC: 7.8 G/DL — LOW (ref 11.5–15.5)
HGB BLD-MCNC: 7.9 G/DL — LOW (ref 11.5–15.5)
HGB BLD-MCNC: 8 G/DL — LOW (ref 11.5–15.5)
HGB BLD-MCNC: 8.1 G/DL — LOW (ref 11.5–15.5)
HGB BLD-MCNC: 8.2 G/DL — LOW (ref 11.5–15.5)
HGB BLD-MCNC: 8.8 G/DL — LOW (ref 11.5–15.5)
HGB BLD-MCNC: 9 G/DL — LOW (ref 11.5–15.5)
HGB BLD-MCNC: 9.1 G/DL — LOW (ref 11.5–15.5)
HGB BLD-MCNC: 9.2 G/DL — LOW (ref 11.5–15.5)
HGB BLD-MCNC: 9.7 G/DL — LOW (ref 11.5–15.5)
HGB BLD-MCNC: 9.7 G/DL — LOW (ref 11.5–15.5)
HGB BLD-MCNC: 9.8 G/DL — LOW (ref 11.5–15.5)
IMM GRANULOCYTES NFR BLD AUTO: 0.5 % — SIGNIFICANT CHANGE UP (ref 0–1.5)
IMM GRANULOCYTES NFR BLD AUTO: 0.5 % — SIGNIFICANT CHANGE UP (ref 0–1.5)
INR BLD: 1.11 RATIO — SIGNIFICANT CHANGE UP (ref 0.88–1.16)
INR BLD: 1.48 RATIO — HIGH (ref 0.88–1.16)
INR BLD: 1.6 RATIO — HIGH (ref 0.88–1.16)
INR PPP: 1.08 RATIO
INR PPP: 1.38 RATIO
KETONES URINE: NEGATIVE
LDLC SERPL CALC-MCNC: 133 MG/DL
LEUKOCYTE ESTERASE URINE: NEGATIVE
LG PLATELETS BLD QL AUTO: SIGNIFICANT CHANGE UP
LG PLATELETS BLD QL AUTO: SLIGHT — SIGNIFICANT CHANGE UP
LIPID PNL WITH DIRECT LDL SERPL: 104 MG/DL — HIGH
LIPID PNL WITH DIRECT LDL SERPL: 110 MG/DL — HIGH
LYMPHOCYTES # BLD AUTO: 1.6 K/UL — SIGNIFICANT CHANGE UP (ref 1–3.3)
LYMPHOCYTES # BLD AUTO: 1.7 K/UL — SIGNIFICANT CHANGE UP (ref 1–3.3)
LYMPHOCYTES # BLD AUTO: 1.8 K/UL — SIGNIFICANT CHANGE UP (ref 1–3.3)
LYMPHOCYTES # BLD AUTO: 1.9 K/UL — SIGNIFICANT CHANGE UP (ref 1–3.3)
LYMPHOCYTES # BLD AUTO: 1.9 K/UL — SIGNIFICANT CHANGE UP (ref 1–3.3)
LYMPHOCYTES # BLD AUTO: 11 % — LOW (ref 13–44)
LYMPHOCYTES # BLD AUTO: 13 % — SIGNIFICANT CHANGE UP (ref 13–44)
LYMPHOCYTES # BLD AUTO: 14 % — SIGNIFICANT CHANGE UP (ref 13–44)
LYMPHOCYTES # BLD AUTO: 2 K/UL — SIGNIFICANT CHANGE UP (ref 1–3.3)
LYMPHOCYTES # BLD AUTO: 2.04 K/UL — SIGNIFICANT CHANGE UP (ref 1–3.3)
LYMPHOCYTES # BLD AUTO: 2.09 K/UL — SIGNIFICANT CHANGE UP (ref 1–3.3)
LYMPHOCYTES # BLD AUTO: 2.1 K/UL — SIGNIFICANT CHANGE UP (ref 1–3.3)
LYMPHOCYTES # BLD AUTO: 2.2 K/UL — SIGNIFICANT CHANGE UP (ref 1–3.3)
LYMPHOCYTES # BLD AUTO: 2.2 K/UL — SIGNIFICANT CHANGE UP (ref 1–3.3)
LYMPHOCYTES # BLD AUTO: 2.3 K/UL — SIGNIFICANT CHANGE UP (ref 1–3.3)
LYMPHOCYTES # BLD AUTO: 2.4 K/UL — SIGNIFICANT CHANGE UP (ref 1–3.3)
LYMPHOCYTES # BLD AUTO: 2.7 K/UL — SIGNIFICANT CHANGE UP (ref 1–3.3)
LYMPHOCYTES # BLD AUTO: 2.9 K/UL — SIGNIFICANT CHANGE UP (ref 1–3.3)
LYMPHOCYTES # BLD AUTO: 22.1 % — SIGNIFICANT CHANGE UP (ref 13–44)
LYMPHOCYTES # BLD AUTO: 23.9 % — SIGNIFICANT CHANGE UP (ref 13–44)
LYMPHOCYTES # BLD AUTO: 24.6 % — SIGNIFICANT CHANGE UP (ref 13–44)
LYMPHOCYTES # BLD AUTO: 26.3 % — SIGNIFICANT CHANGE UP (ref 13–44)
LYMPHOCYTES # BLD AUTO: 26.5 % — SIGNIFICANT CHANGE UP (ref 13–44)
LYMPHOCYTES # BLD AUTO: 27.3 % — SIGNIFICANT CHANGE UP (ref 13–44)
LYMPHOCYTES # BLD AUTO: 27.4 % — SIGNIFICANT CHANGE UP (ref 13–44)
LYMPHOCYTES # BLD AUTO: 27.8 % — SIGNIFICANT CHANGE UP (ref 13–44)
LYMPHOCYTES # BLD AUTO: 29.8 % — SIGNIFICANT CHANGE UP (ref 13–44)
LYMPHOCYTES # BLD AUTO: 3 K/UL — SIGNIFICANT CHANGE UP (ref 1–3.3)
LYMPHOCYTES # BLD AUTO: 3.3 K/UL — SIGNIFICANT CHANGE UP (ref 1–3.3)
LYMPHOCYTES # BLD AUTO: 30.9 % — SIGNIFICANT CHANGE UP (ref 13–44)
LYMPHOCYTES # BLD AUTO: 31.1 % — SIGNIFICANT CHANGE UP (ref 13–44)
LYMPHOCYTES # BLD AUTO: 31.6 % — SIGNIFICANT CHANGE UP (ref 13–44)
LYMPHOCYTES # BLD AUTO: 34.4 % — SIGNIFICANT CHANGE UP (ref 13–44)
LYMPHOCYTES # BLD AUTO: 35 % — SIGNIFICANT CHANGE UP (ref 13–44)
LYMPHOCYTES # BLD AUTO: 36.4 % — SIGNIFICANT CHANGE UP (ref 13–44)
LYMPHOCYTES # BLD AUTO: 39.7 % — SIGNIFICANT CHANGE UP (ref 13–44)
LYMPHOCYTES # BLD AUTO: 39.8 % — SIGNIFICANT CHANGE UP (ref 13–44)
LYMPHOCYTES # BLD AUTO: 40.8 % — SIGNIFICANT CHANGE UP (ref 13–44)
LYMPHOCYTES # BLD AUTO: 44 % — SIGNIFICANT CHANGE UP (ref 13–44)
LYMPHOCYTES # BLD AUTO: 5 % — LOW (ref 13–44)
LYMPHOCYTES # BLD AUTO: 57.3 % — HIGH (ref 13–44)
LYMPHOCYTES # BLD AUTO: 6 % — LOW (ref 13–44)
LYMPHOCYTES # BLD AUTO: 66 % — HIGH (ref 13–44)
LYMPHOCYTES # BLD AUTO: 9.9 % — LOW (ref 13–44)
LYMPHOCYTES # BLD AUTO: SIGNIFICANT CHANGE UP % (ref 13–44)
LYMPHOCYTES # BLD AUTO: SIGNIFICANT CHANGE UP K/UL (ref 1–3.3)
MACROCYTES BLD QL: SLIGHT — SIGNIFICANT CHANGE UP
MAGNESIUM SERPL-MCNC: 1.7 MG/DL
MAGNESIUM SERPL-MCNC: 1.8 MG/DL
MAGNESIUM SERPL-MCNC: 1.8 MG/DL — SIGNIFICANT CHANGE UP (ref 1.6–2.6)
MAGNESIUM SERPL-MCNC: 1.9 MG/DL
MAGNESIUM SERPL-MCNC: 1.9 MG/DL — SIGNIFICANT CHANGE UP (ref 1.6–2.6)
MAGNESIUM SERPL-MCNC: 2 MG/DL
MAGNESIUM SERPL-MCNC: 2 MG/DL — SIGNIFICANT CHANGE UP (ref 1.6–2.6)
MAGNESIUM SERPL-MCNC: 2 MG/DL — SIGNIFICANT CHANGE UP (ref 1.6–2.6)
MAGNESIUM SERPL-MCNC: 2.1 MG/DL
MAGNESIUM SERPL-MCNC: 2.1 MG/DL — SIGNIFICANT CHANGE UP (ref 1.6–2.6)
MAGNESIUM SERPL-MCNC: 2.1 MG/DL — SIGNIFICANT CHANGE UP (ref 1.6–2.6)
MAGNESIUM SERPL-MCNC: 2.2 MG/DL
MCHC RBC-ENTMCNC: 24.3 PG — LOW (ref 27–34)
MCHC RBC-ENTMCNC: 24.4 PG — LOW (ref 27–34)
MCHC RBC-ENTMCNC: 24.6 PG — LOW (ref 27–34)
MCHC RBC-ENTMCNC: 24.6 PG — LOW (ref 27–34)
MCHC RBC-ENTMCNC: 24.7 PG — LOW (ref 27–34)
MCHC RBC-ENTMCNC: 24.8 PG — LOW (ref 27–34)
MCHC RBC-ENTMCNC: 24.9 PG — LOW (ref 27–34)
MCHC RBC-ENTMCNC: 24.9 PG — LOW (ref 27–34)
MCHC RBC-ENTMCNC: 25.1 PG — LOW (ref 27–34)
MCHC RBC-ENTMCNC: 25.1 PG — LOW (ref 27–34)
MCHC RBC-ENTMCNC: 25.3 PG — LOW (ref 27–34)
MCHC RBC-ENTMCNC: 25.4 PG — LOW (ref 27–34)
MCHC RBC-ENTMCNC: 25.4 PG — LOW (ref 27–34)
MCHC RBC-ENTMCNC: 25.7 PG — LOW (ref 27–34)
MCHC RBC-ENTMCNC: 25.7 PG — LOW (ref 27–34)
MCHC RBC-ENTMCNC: 25.8 PG — LOW (ref 27–34)
MCHC RBC-ENTMCNC: 26 PG — LOW (ref 27–34)
MCHC RBC-ENTMCNC: 26.3 PG — LOW (ref 27–34)
MCHC RBC-ENTMCNC: 26.3 PG — LOW (ref 27–34)
MCHC RBC-ENTMCNC: 26.9 PG — LOW (ref 27–34)
MCHC RBC-ENTMCNC: 27.1 PG — SIGNIFICANT CHANGE UP (ref 27–34)
MCHC RBC-ENTMCNC: 27.2 PG — SIGNIFICANT CHANGE UP (ref 27–34)
MCHC RBC-ENTMCNC: 27.4 PG — SIGNIFICANT CHANGE UP (ref 27–34)
MCHC RBC-ENTMCNC: 27.5 PG — SIGNIFICANT CHANGE UP (ref 27–34)
MCHC RBC-ENTMCNC: 27.5 PG — SIGNIFICANT CHANGE UP (ref 27–34)
MCHC RBC-ENTMCNC: 27.6 PG — SIGNIFICANT CHANGE UP (ref 27–34)
MCHC RBC-ENTMCNC: 27.7 PG — SIGNIFICANT CHANGE UP (ref 27–34)
MCHC RBC-ENTMCNC: 28.5 PG — SIGNIFICANT CHANGE UP (ref 27–34)
MCHC RBC-ENTMCNC: 30.5 G/DL — LOW (ref 32–36)
MCHC RBC-ENTMCNC: 30.5 G/DL — LOW (ref 32–36)
MCHC RBC-ENTMCNC: 30.6 G/DL — LOW (ref 32–36)
MCHC RBC-ENTMCNC: 30.6 G/DL — LOW (ref 32–36)
MCHC RBC-ENTMCNC: 30.8 G/DL — LOW (ref 32–36)
MCHC RBC-ENTMCNC: 30.9 G/DL — LOW (ref 32–36)
MCHC RBC-ENTMCNC: 31 G/DL — LOW (ref 32–36)
MCHC RBC-ENTMCNC: 31.1 G/DL — LOW (ref 32–36)
MCHC RBC-ENTMCNC: 31.2 G/DL — LOW (ref 32–36)
MCHC RBC-ENTMCNC: 31.2 G/DL — LOW (ref 32–36)
MCHC RBC-ENTMCNC: 31.3 G/DL — LOW (ref 32–36)
MCHC RBC-ENTMCNC: 31.3 G/DL — LOW (ref 32–36)
MCHC RBC-ENTMCNC: 31.5 G/DL — LOW (ref 32–36)
MCHC RBC-ENTMCNC: 31.5 GM/DL — LOW (ref 32–36)
MCHC RBC-ENTMCNC: 31.6 G/DL — LOW (ref 32–36)
MCHC RBC-ENTMCNC: 31.7 G/DL — LOW (ref 32–36)
MCHC RBC-ENTMCNC: 32 G/DL — SIGNIFICANT CHANGE UP (ref 32–36)
MCHC RBC-ENTMCNC: 32 GM/DL — SIGNIFICANT CHANGE UP (ref 32–36)
MCHC RBC-ENTMCNC: 32.1 G/DL — SIGNIFICANT CHANGE UP (ref 32–36)
MCHC RBC-ENTMCNC: 32.2 G/DL — SIGNIFICANT CHANGE UP (ref 32–36)
MCHC RBC-ENTMCNC: 32.3 G/DL — SIGNIFICANT CHANGE UP (ref 32–36)
MCV RBC AUTO: 78.8 FL — LOW (ref 80–100)
MCV RBC AUTO: 79 FL — LOW (ref 80–100)
MCV RBC AUTO: 79.1 FL — LOW (ref 80–100)
MCV RBC AUTO: 79.2 FL — LOW (ref 80–100)
MCV RBC AUTO: 79.4 FL — LOW (ref 80–100)
MCV RBC AUTO: 79.5 FL — LOW (ref 80–100)
MCV RBC AUTO: 79.6 FL — LOW (ref 80–100)
MCV RBC AUTO: 79.7 FL — LOW (ref 80–100)
MCV RBC AUTO: 79.7 FL — LOW (ref 80–100)
MCV RBC AUTO: 80 FL — SIGNIFICANT CHANGE UP (ref 80–100)
MCV RBC AUTO: 80.6 FL — SIGNIFICANT CHANGE UP (ref 80–100)
MCV RBC AUTO: 80.6 FL — SIGNIFICANT CHANGE UP (ref 80–100)
MCV RBC AUTO: 80.8 FL — SIGNIFICANT CHANGE UP (ref 80–100)
MCV RBC AUTO: 81 FL — SIGNIFICANT CHANGE UP (ref 80–100)
MCV RBC AUTO: 81.2 FL — SIGNIFICANT CHANGE UP (ref 80–100)
MCV RBC AUTO: 81.4 FL — SIGNIFICANT CHANGE UP (ref 80–100)
MCV RBC AUTO: 82.2 FL — SIGNIFICANT CHANGE UP (ref 80–100)
MCV RBC AUTO: 82.6 FL — SIGNIFICANT CHANGE UP (ref 80–100)
MCV RBC AUTO: 84.1 FL — SIGNIFICANT CHANGE UP (ref 80–100)
MCV RBC AUTO: 84.8 FL — SIGNIFICANT CHANGE UP (ref 80–100)
MCV RBC AUTO: 86.9 FL — SIGNIFICANT CHANGE UP (ref 80–100)
MCV RBC AUTO: 87.3 FL — SIGNIFICANT CHANGE UP (ref 80–100)
MCV RBC AUTO: 87.5 FL — SIGNIFICANT CHANGE UP (ref 80–100)
MCV RBC AUTO: 87.5 FL — SIGNIFICANT CHANGE UP (ref 80–100)
MCV RBC AUTO: 88.1 FL — SIGNIFICANT CHANGE UP (ref 80–100)
MCV RBC AUTO: 88.4 FL — SIGNIFICANT CHANGE UP (ref 80–100)
MCV RBC AUTO: 88.4 FL — SIGNIFICANT CHANGE UP (ref 80–100)
MCV RBC AUTO: 92.2 FL — SIGNIFICANT CHANGE UP (ref 80–100)
METAMYELOCYTES # FLD: 1 % — HIGH (ref 0–0)
METAMYELOCYTES # FLD: 1 % — HIGH (ref 0–0)
MICROCYTES BLD QL: SLIGHT — SIGNIFICANT CHANGE UP
MONOCYTES # BLD AUTO: 0.1 K/UL — SIGNIFICANT CHANGE UP (ref 0–0.9)
MONOCYTES # BLD AUTO: 0.3 K/UL — SIGNIFICANT CHANGE UP (ref 0–0.9)
MONOCYTES # BLD AUTO: 0.3 K/UL — SIGNIFICANT CHANGE UP (ref 0–0.9)
MONOCYTES # BLD AUTO: 0.4 K/UL — SIGNIFICANT CHANGE UP (ref 0–0.9)
MONOCYTES # BLD AUTO: 0.5 K/UL — SIGNIFICANT CHANGE UP (ref 0–0.9)
MONOCYTES # BLD AUTO: 0.6 K/UL — SIGNIFICANT CHANGE UP (ref 0–0.9)
MONOCYTES # BLD AUTO: 0.67 K/UL — SIGNIFICANT CHANGE UP (ref 0–0.9)
MONOCYTES # BLD AUTO: 0.7 K/UL — SIGNIFICANT CHANGE UP (ref 0–0.9)
MONOCYTES # BLD AUTO: 0.79 K/UL — SIGNIFICANT CHANGE UP (ref 0–0.9)
MONOCYTES # BLD AUTO: 0.8 K/UL — SIGNIFICANT CHANGE UP (ref 0–0.9)
MONOCYTES # BLD AUTO: 0.9 K/UL — SIGNIFICANT CHANGE UP (ref 0–0.9)
MONOCYTES # BLD AUTO: 1 K/UL — HIGH (ref 0–0.9)
MONOCYTES # BLD AUTO: 1.9 K/UL — HIGH (ref 0–0.9)
MONOCYTES # BLD AUTO: 2 K/UL — HIGH (ref 0–0.9)
MONOCYTES # BLD AUTO: 2.3 K/UL — HIGH (ref 0–0.9)
MONOCYTES # BLD AUTO: 3 K/UL — HIGH (ref 0–0.9)
MONOCYTES # BLD AUTO: 3 K/UL — HIGH (ref 0–0.9)
MONOCYTES # BLD AUTO: 3.8 K/UL — HIGH (ref 0–0.9)
MONOCYTES # BLD AUTO: SIGNIFICANT CHANGE UP K/UL (ref 0–0.9)
MONOCYTES NFR BLD AUTO: 10 % — SIGNIFICANT CHANGE UP (ref 2–14)
MONOCYTES NFR BLD AUTO: 10 % — SIGNIFICANT CHANGE UP (ref 2–14)
MONOCYTES NFR BLD AUTO: 10.6 % — SIGNIFICANT CHANGE UP (ref 2–14)
MONOCYTES NFR BLD AUTO: 11.2 % — SIGNIFICANT CHANGE UP (ref 2–14)
MONOCYTES NFR BLD AUTO: 12.3 % — SIGNIFICANT CHANGE UP (ref 2–14)
MONOCYTES NFR BLD AUTO: 2.3 % — SIGNIFICANT CHANGE UP (ref 2–14)
MONOCYTES NFR BLD AUTO: 4.1 % — SIGNIFICANT CHANGE UP (ref 2–14)
MONOCYTES NFR BLD AUTO: 4.3 % — SIGNIFICANT CHANGE UP (ref 2–14)
MONOCYTES NFR BLD AUTO: 6 % — SIGNIFICANT CHANGE UP (ref 2–14)
MONOCYTES NFR BLD AUTO: 7 % — SIGNIFICANT CHANGE UP (ref 2–14)
MONOCYTES NFR BLD AUTO: 7 % — SIGNIFICANT CHANGE UP (ref 2–14)
MONOCYTES NFR BLD AUTO: 7.5 % — SIGNIFICANT CHANGE UP (ref 2–14)
MONOCYTES NFR BLD AUTO: 7.8 % — SIGNIFICANT CHANGE UP (ref 2–14)
MONOCYTES NFR BLD AUTO: 7.9 % — SIGNIFICANT CHANGE UP (ref 2–14)
MONOCYTES NFR BLD AUTO: 8 % — SIGNIFICANT CHANGE UP (ref 2–14)
MONOCYTES NFR BLD AUTO: 8.2 % — SIGNIFICANT CHANGE UP (ref 2–14)
MONOCYTES NFR BLD AUTO: 8.2 % — SIGNIFICANT CHANGE UP (ref 2–14)
MONOCYTES NFR BLD AUTO: 8.3 % — SIGNIFICANT CHANGE UP (ref 2–14)
MONOCYTES NFR BLD AUTO: 8.7 % — SIGNIFICANT CHANGE UP (ref 2–14)
MONOCYTES NFR BLD AUTO: 8.7 % — SIGNIFICANT CHANGE UP (ref 2–14)
MONOCYTES NFR BLD AUTO: 8.8 % — SIGNIFICANT CHANGE UP (ref 2–14)
MONOCYTES NFR BLD AUTO: 9 % — SIGNIFICANT CHANGE UP (ref 2–14)
MONOCYTES NFR BLD AUTO: 9.1 % — SIGNIFICANT CHANGE UP (ref 2–14)
MONOCYTES NFR BLD AUTO: 9.2 % — SIGNIFICANT CHANGE UP (ref 2–14)
MONOCYTES NFR BLD AUTO: 9.2 % — SIGNIFICANT CHANGE UP (ref 2–14)
MONOCYTES NFR BLD AUTO: 9.3 % — SIGNIFICANT CHANGE UP (ref 2–14)
MONOCYTES NFR BLD AUTO: 9.5 % — SIGNIFICANT CHANGE UP (ref 2–14)
MONOCYTES NFR BLD AUTO: SIGNIFICANT CHANGE UP % (ref 2–14)
MYELOCYTES NFR BLD: 1 % — HIGH (ref 0–0)
MYELOCYTES NFR BLD: 2 % — HIGH (ref 0–0)
MYELOCYTES NFR BLD: 3 % — HIGH (ref 0–0)
NEUTROPHILS # BLD AUTO: 0.8 K/UL — LOW (ref 1.8–7.4)
NEUTROPHILS # BLD AUTO: 1.2 K/UL — LOW (ref 1.8–7.4)
NEUTROPHILS # BLD AUTO: 1.6 K/UL — LOW (ref 1.8–7.4)
NEUTROPHILS # BLD AUTO: 16.5 K/UL — HIGH (ref 1.8–7.4)
NEUTROPHILS # BLD AUTO: 17.2 K/UL — HIGH (ref 1.8–7.4)
NEUTROPHILS # BLD AUTO: 19.3 K/UL — HIGH (ref 1.8–7.4)
NEUTROPHILS # BLD AUTO: 2.4 K/UL — SIGNIFICANT CHANGE UP (ref 1.8–7.4)
NEUTROPHILS # BLD AUTO: 2.9 K/UL — SIGNIFICANT CHANGE UP (ref 1.8–7.4)
NEUTROPHILS # BLD AUTO: 23.9 K/UL — HIGH (ref 1.8–7.4)
NEUTROPHILS # BLD AUTO: 24.9 K/UL — HIGH (ref 1.8–7.4)
NEUTROPHILS # BLD AUTO: 3.4 K/UL — SIGNIFICANT CHANGE UP (ref 1.8–7.4)
NEUTROPHILS # BLD AUTO: 3.4 K/UL — SIGNIFICANT CHANGE UP (ref 1.8–7.4)
NEUTROPHILS # BLD AUTO: 3.5 K/UL — SIGNIFICANT CHANGE UP (ref 1.8–7.4)
NEUTROPHILS # BLD AUTO: 3.5 K/UL — SIGNIFICANT CHANGE UP (ref 1.8–7.4)
NEUTROPHILS # BLD AUTO: 3.7 K/UL — SIGNIFICANT CHANGE UP (ref 1.8–7.4)
NEUTROPHILS # BLD AUTO: 3.7 K/UL — SIGNIFICANT CHANGE UP (ref 1.8–7.4)
NEUTROPHILS # BLD AUTO: 3.8 K/UL — SIGNIFICANT CHANGE UP (ref 1.8–7.4)
NEUTROPHILS # BLD AUTO: 4.3 K/UL — SIGNIFICANT CHANGE UP (ref 1.8–7.4)
NEUTROPHILS # BLD AUTO: 4.45 K/UL — SIGNIFICANT CHANGE UP (ref 1.8–7.4)
NEUTROPHILS # BLD AUTO: 4.9 K/UL — SIGNIFICANT CHANGE UP (ref 1.8–7.4)
NEUTROPHILS # BLD AUTO: 42.3 K/UL — HIGH (ref 1.8–7.4)
NEUTROPHILS # BLD AUTO: 5.1 K/UL — SIGNIFICANT CHANGE UP (ref 1.8–7.4)
NEUTROPHILS # BLD AUTO: 5.2 K/UL — SIGNIFICANT CHANGE UP (ref 1.8–7.4)
NEUTROPHILS # BLD AUTO: 5.26 K/UL — SIGNIFICANT CHANGE UP (ref 1.8–7.4)
NEUTROPHILS # BLD AUTO: 5.5 K/UL — SIGNIFICANT CHANGE UP (ref 1.8–7.4)
NEUTROPHILS # BLD AUTO: 6.9 K/UL — SIGNIFICANT CHANGE UP (ref 1.8–7.4)
NEUTROPHILS # BLD AUTO: 7 K/UL — SIGNIFICANT CHANGE UP (ref 1.8–7.4)
NEUTROPHILS # BLD AUTO: SIGNIFICANT CHANGE UP K/UL (ref 1.8–7.4)
NEUTROPHILS NFR BLD AUTO: 18 % — LOW (ref 43–77)
NEUTROPHILS NFR BLD AUTO: 28.5 % — LOW (ref 43–77)
NEUTROPHILS NFR BLD AUTO: 42.7 % — LOW (ref 43–77)
NEUTROPHILS NFR BLD AUTO: 48.5 % — SIGNIFICANT CHANGE UP (ref 43–77)
NEUTROPHILS NFR BLD AUTO: 50.9 % — SIGNIFICANT CHANGE UP (ref 43–77)
NEUTROPHILS NFR BLD AUTO: 51.9 % — SIGNIFICANT CHANGE UP (ref 43–77)
NEUTROPHILS NFR BLD AUTO: 52.2 % — SIGNIFICANT CHANGE UP (ref 43–77)
NEUTROPHILS NFR BLD AUTO: 52.4 % — SIGNIFICANT CHANGE UP (ref 43–77)
NEUTROPHILS NFR BLD AUTO: 54 % — SIGNIFICANT CHANGE UP (ref 43–77)
NEUTROPHILS NFR BLD AUTO: 54.7 % — SIGNIFICANT CHANGE UP (ref 43–77)
NEUTROPHILS NFR BLD AUTO: 55.8 % — SIGNIFICANT CHANGE UP (ref 43–77)
NEUTROPHILS NFR BLD AUTO: 55.8 % — SIGNIFICANT CHANGE UP (ref 43–77)
NEUTROPHILS NFR BLD AUTO: 58.2 % — SIGNIFICANT CHANGE UP (ref 43–77)
NEUTROPHILS NFR BLD AUTO: 58.6 % — SIGNIFICANT CHANGE UP (ref 43–77)
NEUTROPHILS NFR BLD AUTO: 59.2 % — SIGNIFICANT CHANGE UP (ref 43–77)
NEUTROPHILS NFR BLD AUTO: 60 % — SIGNIFICANT CHANGE UP (ref 43–77)
NEUTROPHILS NFR BLD AUTO: 60.7 % — SIGNIFICANT CHANGE UP (ref 43–77)
NEUTROPHILS NFR BLD AUTO: 61.5 % — SIGNIFICANT CHANGE UP (ref 43–77)
NEUTROPHILS NFR BLD AUTO: 62.9 % — SIGNIFICANT CHANGE UP (ref 43–77)
NEUTROPHILS NFR BLD AUTO: 63 % — SIGNIFICANT CHANGE UP (ref 43–77)
NEUTROPHILS NFR BLD AUTO: 68.1 % — SIGNIFICANT CHANGE UP (ref 43–77)
NEUTROPHILS NFR BLD AUTO: 73 % — SIGNIFICANT CHANGE UP (ref 43–77)
NEUTROPHILS NFR BLD AUTO: 77 % — SIGNIFICANT CHANGE UP (ref 43–77)
NEUTROPHILS NFR BLD AUTO: 77 % — SIGNIFICANT CHANGE UP (ref 43–77)
NEUTROPHILS NFR BLD AUTO: 80 % — HIGH (ref 43–77)
NEUTROPHILS NFR BLD AUTO: 80 % — HIGH (ref 43–77)
NEUTROPHILS NFR BLD AUTO: 81 % — HIGH (ref 43–77)
NEUTROPHILS NFR BLD AUTO: SIGNIFICANT CHANGE UP % (ref 43–77)
NEUTS BAND # BLD: 1 % — SIGNIFICANT CHANGE UP (ref 0–8)
NEUTS BAND # BLD: 2 % — SIGNIFICANT CHANGE UP (ref 0–8)
NEUTS BAND # BLD: 2 % — SIGNIFICANT CHANGE UP (ref 0–8)
NEUTS BAND # BLD: 3 % — SIGNIFICANT CHANGE UP (ref 0–8)
NITRITE URINE: NEGATIVE
NON HDL CHOLESTEROL: 127 MG/DL — SIGNIFICANT CHANGE UP
NON HDL CHOLESTEROL: 132 MG/DL — HIGH
NONHDLC SERPL-MCNC: 159 MG/DL
OVALOCYTES BLD QL SMEAR: SLIGHT — SIGNIFICANT CHANGE UP
PH URINE: 6
PLAT MORPH BLD: NORMAL — SIGNIFICANT CHANGE UP
PLATELET # BLD AUTO: 123 K/UL — LOW (ref 150–400)
PLATELET # BLD AUTO: 190 K/UL — SIGNIFICANT CHANGE UP (ref 150–400)
PLATELET # BLD AUTO: 198 K/UL — SIGNIFICANT CHANGE UP (ref 150–400)
PLATELET # BLD AUTO: 201 K/UL — SIGNIFICANT CHANGE UP (ref 150–400)
PLATELET # BLD AUTO: 202 K/UL — SIGNIFICANT CHANGE UP (ref 150–400)
PLATELET # BLD AUTO: 212 K/UL — SIGNIFICANT CHANGE UP (ref 150–400)
PLATELET # BLD AUTO: 214 K/UL — SIGNIFICANT CHANGE UP (ref 150–400)
PLATELET # BLD AUTO: 222 K/UL — SIGNIFICANT CHANGE UP (ref 150–400)
PLATELET # BLD AUTO: 264 K/UL — SIGNIFICANT CHANGE UP (ref 150–400)
PLATELET # BLD AUTO: 268 K/UL — SIGNIFICANT CHANGE UP (ref 150–400)
PLATELET # BLD AUTO: 272 K/UL — SIGNIFICANT CHANGE UP (ref 150–400)
PLATELET # BLD AUTO: 276 K/UL — SIGNIFICANT CHANGE UP (ref 150–400)
PLATELET # BLD AUTO: 289 K/UL — SIGNIFICANT CHANGE UP (ref 150–400)
PLATELET # BLD AUTO: 313 K/UL — SIGNIFICANT CHANGE UP (ref 150–400)
PLATELET # BLD AUTO: 325 K/UL — SIGNIFICANT CHANGE UP (ref 150–400)
PLATELET # BLD AUTO: 343 K/UL — SIGNIFICANT CHANGE UP (ref 150–400)
PLATELET # BLD AUTO: 343 K/UL — SIGNIFICANT CHANGE UP (ref 150–400)
PLATELET # BLD AUTO: 352 K/UL — SIGNIFICANT CHANGE UP (ref 150–400)
PLATELET # BLD AUTO: 358 K/UL — SIGNIFICANT CHANGE UP (ref 150–400)
PLATELET # BLD AUTO: 363 K/UL — SIGNIFICANT CHANGE UP (ref 150–400)
PLATELET # BLD AUTO: 386 K/UL — SIGNIFICANT CHANGE UP (ref 150–400)
PLATELET # BLD AUTO: 392 K/UL — SIGNIFICANT CHANGE UP (ref 150–400)
PLATELET # BLD AUTO: 413 K/UL — HIGH (ref 150–400)
PLATELET # BLD AUTO: 414 K/UL — HIGH (ref 150–400)
PLATELET # BLD AUTO: 466 K/UL — HIGH (ref 150–400)
PLATELET # BLD AUTO: 468 K/UL — HIGH (ref 150–400)
PLATELET # BLD AUTO: 520 K/UL — HIGH (ref 150–400)
PLATELET # BLD AUTO: 587 K/UL — HIGH (ref 150–400)
POIKILOCYTOSIS BLD QL AUTO: SLIGHT — SIGNIFICANT CHANGE UP
POLYCHROMASIA BLD QL SMEAR: SLIGHT — SIGNIFICANT CHANGE UP
POTASSIUM SERPL-MCNC: 3.8 MMOL/L — SIGNIFICANT CHANGE UP (ref 3.5–5.3)
POTASSIUM SERPL-MCNC: 3.8 MMOL/L — SIGNIFICANT CHANGE UP (ref 3.5–5.3)
POTASSIUM SERPL-MCNC: 3.9 MMOL/L — SIGNIFICANT CHANGE UP (ref 3.5–5.3)
POTASSIUM SERPL-MCNC: 3.9 MMOL/L — SIGNIFICANT CHANGE UP (ref 3.5–5.3)
POTASSIUM SERPL-MCNC: 4 MMOL/L — SIGNIFICANT CHANGE UP (ref 3.5–5.3)
POTASSIUM SERPL-MCNC: 4 MMOL/L — SIGNIFICANT CHANGE UP (ref 3.5–5.3)
POTASSIUM SERPL-MCNC: 4.1 MMOL/L — SIGNIFICANT CHANGE UP (ref 3.5–5.3)
POTASSIUM SERPL-MCNC: 4.1 MMOL/L — SIGNIFICANT CHANGE UP (ref 3.5–5.3)
POTASSIUM SERPL-MCNC: 4.2 MMOL/L — SIGNIFICANT CHANGE UP (ref 3.5–5.3)
POTASSIUM SERPL-MCNC: 4.3 MMOL/L — SIGNIFICANT CHANGE UP (ref 3.5–5.3)
POTASSIUM SERPL-MCNC: 4.4 MMOL/L — SIGNIFICANT CHANGE UP (ref 3.5–5.3)
POTASSIUM SERPL-MCNC: 4.4 MMOL/L — SIGNIFICANT CHANGE UP (ref 3.5–5.3)
POTASSIUM SERPL-MCNC: 4.5 MMOL/L — SIGNIFICANT CHANGE UP (ref 3.5–5.3)
POTASSIUM SERPL-MCNC: 5 MMOL/L — SIGNIFICANT CHANGE UP (ref 3.5–5.3)
POTASSIUM SERPL-MCNC: 5.6 MMOL/L — HIGH (ref 3.5–5.3)
POTASSIUM SERPL-SCNC: 3.8 MMOL/L — SIGNIFICANT CHANGE UP (ref 3.5–5.3)
POTASSIUM SERPL-SCNC: 3.8 MMOL/L — SIGNIFICANT CHANGE UP (ref 3.5–5.3)
POTASSIUM SERPL-SCNC: 3.9 MMOL/L — SIGNIFICANT CHANGE UP (ref 3.5–5.3)
POTASSIUM SERPL-SCNC: 3.9 MMOL/L — SIGNIFICANT CHANGE UP (ref 3.5–5.3)
POTASSIUM SERPL-SCNC: 4 MMOL/L
POTASSIUM SERPL-SCNC: 4 MMOL/L — SIGNIFICANT CHANGE UP (ref 3.5–5.3)
POTASSIUM SERPL-SCNC: 4 MMOL/L — SIGNIFICANT CHANGE UP (ref 3.5–5.3)
POTASSIUM SERPL-SCNC: 4.1 MMOL/L — SIGNIFICANT CHANGE UP (ref 3.5–5.3)
POTASSIUM SERPL-SCNC: 4.1 MMOL/L — SIGNIFICANT CHANGE UP (ref 3.5–5.3)
POTASSIUM SERPL-SCNC: 4.2 MMOL/L
POTASSIUM SERPL-SCNC: 4.2 MMOL/L — SIGNIFICANT CHANGE UP (ref 3.5–5.3)
POTASSIUM SERPL-SCNC: 4.3 MMOL/L
POTASSIUM SERPL-SCNC: 4.3 MMOL/L — SIGNIFICANT CHANGE UP (ref 3.5–5.3)
POTASSIUM SERPL-SCNC: 4.4 MMOL/L
POTASSIUM SERPL-SCNC: 4.4 MMOL/L — SIGNIFICANT CHANGE UP (ref 3.5–5.3)
POTASSIUM SERPL-SCNC: 4.4 MMOL/L — SIGNIFICANT CHANGE UP (ref 3.5–5.3)
POTASSIUM SERPL-SCNC: 4.5 MMOL/L
POTASSIUM SERPL-SCNC: 4.5 MMOL/L
POTASSIUM SERPL-SCNC: 4.5 MMOL/L — SIGNIFICANT CHANGE UP (ref 3.5–5.3)
POTASSIUM SERPL-SCNC: 4.6 MMOL/L
POTASSIUM SERPL-SCNC: 4.7 MMOL/L
POTASSIUM SERPL-SCNC: 4.8 MMOL/L
POTASSIUM SERPL-SCNC: 5 MMOL/L — SIGNIFICANT CHANGE UP (ref 3.5–5.3)
POTASSIUM SERPL-SCNC: 5.6 MMOL/L — HIGH (ref 3.5–5.3)
PROMYELOCYTES # FLD: 2 % — HIGH (ref 0–0)
PROT SERPL-MCNC: 7 G/DL
PROT SERPL-MCNC: 7.1 G/DL
PROT SERPL-MCNC: 7.1 G/DL — SIGNIFICANT CHANGE UP (ref 6–8.3)
PROT SERPL-MCNC: 7.2 G/DL
PROT SERPL-MCNC: 7.3 G/DL
PROT SERPL-MCNC: 7.3 G/DL
PROT SERPL-MCNC: 7.3 G/DL — SIGNIFICANT CHANGE UP (ref 6.6–8.7)
PROT SERPL-MCNC: 7.3 G/DL — SIGNIFICANT CHANGE UP (ref 6–8.3)
PROT SERPL-MCNC: 7.4 G/DL
PROT SERPL-MCNC: 7.4 G/DL — SIGNIFICANT CHANGE UP (ref 6–8.3)
PROT SERPL-MCNC: 7.5 G/DL
PROT SERPL-MCNC: 7.5 G/DL
PROT SERPL-MCNC: 7.5 G/DL — SIGNIFICANT CHANGE UP (ref 6–8.3)
PROT SERPL-MCNC: 7.5 G/DL — SIGNIFICANT CHANGE UP (ref 6–8.3)
PROT SERPL-MCNC: 7.6 G/DL
PROT SERPL-MCNC: 7.6 G/DL
PROT SERPL-MCNC: 7.6 G/DL — SIGNIFICANT CHANGE UP (ref 6–8.3)
PROT SERPL-MCNC: 7.6 G/DL — SIGNIFICANT CHANGE UP (ref 6–8.3)
PROT SERPL-MCNC: 7.7 G/DL
PROT SERPL-MCNC: 7.7 G/DL — SIGNIFICANT CHANGE UP (ref 6–8.3)
PROT SERPL-MCNC: 7.7 G/DL — SIGNIFICANT CHANGE UP (ref 6–8.3)
PROT SERPL-MCNC: 7.8 G/DL — SIGNIFICANT CHANGE UP (ref 6–8.3)
PROT SERPL-MCNC: 7.9 G/DL — SIGNIFICANT CHANGE UP (ref 6.6–8.7)
PROT SERPL-MCNC: 8.1 G/DL — SIGNIFICANT CHANGE UP (ref 6–8.3)
PROT SERPL-MCNC: 8.2 G/DL — SIGNIFICANT CHANGE UP (ref 6–8.3)
PROTEIN URINE: NEGATIVE
PROTHROM AB SERPL-ACNC: 12.9 SEC — SIGNIFICANT CHANGE UP (ref 10.5–13.4)
PROTHROM AB SERPL-ACNC: 16.9 SEC — HIGH (ref 10.6–13.6)
PROTHROM AB SERPL-ACNC: 18.1 SEC — HIGH (ref 10.6–13.6)
PT BLD: 12.7 SEC
PT BLD: 16.1 SEC
RBC # BLD: 2.85 M/UL — LOW (ref 3.8–5.2)
RBC # BLD: 2.95 M/UL — LOW (ref 3.8–5.2)
RBC # BLD: 3.04 M/UL — LOW (ref 3.8–5.2)
RBC # BLD: 3.04 M/UL — LOW (ref 3.8–5.2)
RBC # BLD: 3.18 M/UL — LOW (ref 3.8–5.2)
RBC # BLD: 3.28 M/UL — LOW (ref 3.8–5.2)
RBC # BLD: 3.43 M/UL — LOW (ref 3.8–5.2)
RBC # BLD: 3.57 M/UL — LOW (ref 3.8–5.2)
RBC # BLD: 3.63 M/UL — LOW (ref 3.8–5.2)
RBC # BLD: 3.79 M/UL — LOW (ref 3.8–5.2)
RBC # BLD: 3.8 M/UL — SIGNIFICANT CHANGE UP (ref 3.8–5.2)
RBC # BLD: 3.84 M/UL — SIGNIFICANT CHANGE UP (ref 3.8–5.2)
RBC # BLD: 4.24 M/UL — SIGNIFICANT CHANGE UP (ref 3.8–5.2)
RBC # BLD: 4.39 M/UL — SIGNIFICANT CHANGE UP (ref 3.8–5.2)
RBC # BLD: 4.66 M/UL — SIGNIFICANT CHANGE UP (ref 3.8–5.2)
RBC # BLD: 4.69 M/UL — SIGNIFICANT CHANGE UP (ref 3.8–5.2)
RBC # BLD: 4.72 M/UL — SIGNIFICANT CHANGE UP (ref 3.8–5.2)
RBC # BLD: 4.9 M/UL — SIGNIFICANT CHANGE UP (ref 3.8–5.2)
RBC # BLD: 4.9 M/UL — SIGNIFICANT CHANGE UP (ref 3.8–5.2)
RBC # BLD: 5.01 M/UL — SIGNIFICANT CHANGE UP (ref 3.8–5.2)
RBC # BLD: 5.04 M/UL — SIGNIFICANT CHANGE UP (ref 3.8–5.2)
RBC # BLD: 5.1 M/UL — SIGNIFICANT CHANGE UP (ref 3.8–5.2)
RBC # BLD: 5.16 M/UL — SIGNIFICANT CHANGE UP (ref 3.8–5.2)
RBC # BLD: 5.22 M/UL — HIGH (ref 3.8–5.2)
RBC # BLD: 5.38 M/UL — HIGH (ref 3.8–5.2)
RBC # BLD: 5.51 M/UL — HIGH (ref 3.8–5.2)
RBC # FLD: 11.2 % — SIGNIFICANT CHANGE UP (ref 10.3–14.5)
RBC # FLD: 11.2 % — SIGNIFICANT CHANGE UP (ref 10.3–14.5)
RBC # FLD: 11.3 % — SIGNIFICANT CHANGE UP (ref 10.3–14.5)
RBC # FLD: 11.4 % — SIGNIFICANT CHANGE UP (ref 10.3–14.5)
RBC # FLD: 11.4 % — SIGNIFICANT CHANGE UP (ref 10.3–14.5)
RBC # FLD: 11.6 % — SIGNIFICANT CHANGE UP (ref 10.3–14.5)
RBC # FLD: 12.2 % — SIGNIFICANT CHANGE UP (ref 10.3–14.5)
RBC # FLD: 12.7 % — SIGNIFICANT CHANGE UP (ref 10.3–14.5)
RBC # FLD: 12.8 % — SIGNIFICANT CHANGE UP (ref 10.3–14.5)
RBC # FLD: 12.9 % — SIGNIFICANT CHANGE UP (ref 10.3–14.5)
RBC # FLD: 13 % — SIGNIFICANT CHANGE UP (ref 10.3–14.5)
RBC # FLD: 15.6 % — HIGH (ref 10.3–14.5)
RBC # FLD: 16.1 % — HIGH (ref 10.3–14.5)
RBC # FLD: 16.2 % — HIGH (ref 10.3–14.5)
RBC # FLD: 16.8 % — HIGH (ref 10.3–14.5)
RBC # FLD: 17.1 % — HIGH (ref 10.3–14.5)
RBC # FLD: 18.4 % — HIGH (ref 10.3–14.5)
RBC # FLD: 18.5 % — HIGH (ref 10.3–14.5)
RBC # FLD: 18.8 % — HIGH (ref 10.3–14.5)
RBC # FLD: 19 % — HIGH (ref 10.3–14.5)
RBC # FLD: 19 % — HIGH (ref 10.3–14.5)
RBC # FLD: 19.1 % — HIGH (ref 10.3–14.5)
RBC # FLD: 19.5 % — HIGH (ref 10.3–14.5)
RBC # FLD: 19.6 % — HIGH (ref 10.3–14.5)
RBC # FLD: 19.7 % — HIGH (ref 10.3–14.5)
RBC # FLD: 20 % — HIGH (ref 10.3–14.5)
RBC # FLD: 20.1 % — HIGH (ref 10.3–14.5)
RBC # FLD: 21.6 % — HIGH (ref 10.3–14.5)
RBC BLD AUTO: SIGNIFICANT CHANGE UP
RSV RNA NPH QL NAA+NON-PROBE: SIGNIFICANT CHANGE UP
SARS-COV-2 RNA SPEC QL NAA+PROBE: SIGNIFICANT CHANGE UP
SARS-COV-2 RNA SPEC QL NAA+PROBE: SIGNIFICANT CHANGE UP
SODIUM SERPL-SCNC: 133 MMOL/L
SODIUM SERPL-SCNC: 135 MMOL/L
SODIUM SERPL-SCNC: 135 MMOL/L — SIGNIFICANT CHANGE UP (ref 135–145)
SODIUM SERPL-SCNC: 135 MMOL/L — SIGNIFICANT CHANGE UP (ref 135–145)
SODIUM SERPL-SCNC: 136 MMOL/L
SODIUM SERPL-SCNC: 136 MMOL/L — SIGNIFICANT CHANGE UP (ref 135–145)
SODIUM SERPL-SCNC: 136 MMOL/L — SIGNIFICANT CHANGE UP (ref 135–145)
SODIUM SERPL-SCNC: 137 MMOL/L — SIGNIFICANT CHANGE UP (ref 135–145)
SODIUM SERPL-SCNC: 138 MMOL/L
SODIUM SERPL-SCNC: 138 MMOL/L — SIGNIFICANT CHANGE UP (ref 135–145)
SODIUM SERPL-SCNC: 139 MMOL/L
SODIUM SERPL-SCNC: 139 MMOL/L — SIGNIFICANT CHANGE UP (ref 135–145)
SODIUM SERPL-SCNC: 140 MMOL/L
SODIUM SERPL-SCNC: 140 MMOL/L — SIGNIFICANT CHANGE UP (ref 135–145)
SODIUM SERPL-SCNC: 140 MMOL/L — SIGNIFICANT CHANGE UP (ref 135–145)
SODIUM SERPL-SCNC: 141 MMOL/L
SODIUM SERPL-SCNC: 141 MMOL/L — SIGNIFICANT CHANGE UP (ref 135–145)
SPECIFIC GRAVITY URINE: 1
STOMATOCYTES BLD QL SMEAR: PRESENT — SIGNIFICANT CHANGE UP
SURGICAL PATHOLOGY STUDY: SIGNIFICANT CHANGE UP
T3 SERPL-MCNC: 106 NG/DL — SIGNIFICANT CHANGE UP (ref 80–200)
T3 SERPL-MCNC: 109 NG/DL — SIGNIFICANT CHANGE UP (ref 80–200)
T3 SERPL-MCNC: 112 NG/DL — SIGNIFICANT CHANGE UP (ref 80–200)
T3 SERPL-MCNC: 124 NG/DL — SIGNIFICANT CHANGE UP (ref 80–200)
T3 SERPL-MCNC: 126 NG/DL — SIGNIFICANT CHANGE UP (ref 80–200)
T3 SERPL-MCNC: 88 NG/DL — SIGNIFICANT CHANGE UP (ref 80–200)
T3 SERPL-MCNC: 93 NG/DL — SIGNIFICANT CHANGE UP (ref 80–200)
T3/T3 UPTAKE INDEX SERPL-RTO: 33 % — SIGNIFICANT CHANGE UP (ref 28–41)
T3/T3 UPTAKE INDEX SERPL-RTO: 35 % — SIGNIFICANT CHANGE UP (ref 28–41)
T3/T3 UPTAKE INDEX SERPL-RTO: 35 % — SIGNIFICANT CHANGE UP (ref 28–41)
T3/T3 UPTAKE INDEX SERPL-RTO: 37 % — SIGNIFICANT CHANGE UP (ref 28–41)
T3/T3 UPTAKE INDEX SERPL-RTO: 38 % — SIGNIFICANT CHANGE UP (ref 28–41)
T4 AB SER-ACNC: 8.1 UG/DL — SIGNIFICANT CHANGE UP (ref 4.5–12)
T4 AB SER-ACNC: 8.7 UG/DL — SIGNIFICANT CHANGE UP (ref 4.6–12)
T4 AB SER-ACNC: 9.8 UG/DL — SIGNIFICANT CHANGE UP (ref 4.6–12)
T4 FREE+ TSH PNL SERPL: 0.66 UIU/ML — SIGNIFICANT CHANGE UP (ref 0.27–4.2)
T4 FREE+ TSH PNL SERPL: 0.69 UIU/ML — SIGNIFICANT CHANGE UP (ref 0.27–4.2)
T4 FREE+ TSH PNL SERPL: 0.98 UIU/ML — SIGNIFICANT CHANGE UP (ref 0.27–4.2)
T4 FREE+ TSH PNL SERPL: 1 UIU/ML — SIGNIFICANT CHANGE UP (ref 0.27–4.2)
T4 FREE+ TSH PNL SERPL: 1.21 UIU/ML — SIGNIFICANT CHANGE UP (ref 0.27–4.2)
T4 FREE+ TSH PNL SERPL: 1.29 UIU/ML — SIGNIFICANT CHANGE UP (ref 0.27–4.2)
T4 FREE+ TSH PNL SERPL: 1.32 UIU/ML — SIGNIFICANT CHANGE UP (ref 0.27–4.2)
T4 FREE+ TSH PNL SERPL: 1.37 UIU/ML — SIGNIFICANT CHANGE UP (ref 0.27–4.2)
T4 FREE+ TSH PNL SERPL: 1.42 UIU/ML — SIGNIFICANT CHANGE UP (ref 0.27–4.2)
T4/T3 UPTAKE INDEX SERPL: 0.9 TBI — SIGNIFICANT CHANGE UP (ref 0.8–1.3)
T4/T3 UPTAKE INDEX SERPL: 1 TBI — SIGNIFICANT CHANGE UP (ref 0.8–1.3)
T4/T3 UPTAKE INDEX SERPL: 1.1 TBI — SIGNIFICANT CHANGE UP (ref 0.8–1.3)
TOXIC GRANULES BLD QL SMEAR: PRESENT — SIGNIFICANT CHANGE UP
TRIGL SERPL-MCNC: 108 MG/DL — SIGNIFICANT CHANGE UP
TRIGL SERPL-MCNC: 113 MG/DL — SIGNIFICANT CHANGE UP
TRIGL SERPL-MCNC: 132 MG/DL
TROPONIN T SERPL-MCNC: <0.01 NG/ML — SIGNIFICANT CHANGE UP (ref 0–0.06)
TSH SERPL-ACNC: 1.2 UIU/ML
TSH SERPL-MCNC: 1.06 UIU/ML — SIGNIFICANT CHANGE UP (ref 0.27–4.2)
TSH SERPL-MCNC: 1.19 UIU/ML — SIGNIFICANT CHANGE UP (ref 0.27–4.2)
TSH SERPL-MCNC: 1.49 UIU/ML — SIGNIFICANT CHANGE UP (ref 0.27–4.2)
URATE SERPL-MCNC: 6.3 MG/DL
UROBILINOGEN URINE: NORMAL
VARIANT LYMPHS # BLD: 1 % — SIGNIFICANT CHANGE UP (ref 0–6)
VARIANT LYMPHS # BLD: 1 % — SIGNIFICANT CHANGE UP (ref 0–6)
VARIANT LYMPHS # BLD: 2 % — SIGNIFICANT CHANGE UP (ref 0–6)
WBC # BLD: 10.3 K/UL — SIGNIFICANT CHANGE UP (ref 3.8–10.5)
WBC # BLD: 11 K/UL — HIGH (ref 3.8–10.5)
WBC # BLD: 21.1 K/UL — HIGH (ref 3.8–10.5)
WBC # BLD: 21.6 K/UL — HIGH (ref 3.8–10.5)
WBC # BLD: 24.2 K/UL — HIGH (ref 3.8–10.5)
WBC # BLD: 29.8 K/UL — HIGH (ref 3.8–10.5)
WBC # BLD: 3.8 K/UL — SIGNIFICANT CHANGE UP (ref 3.8–10.5)
WBC # BLD: 30.4 K/UL — HIGH (ref 3.8–10.5)
WBC # BLD: 4 K/UL — SIGNIFICANT CHANGE UP (ref 3.8–10.5)
WBC # BLD: 4.2 K/UL — SIGNIFICANT CHANGE UP (ref 3.8–10.5)
WBC # BLD: 4.9 K/UL — SIGNIFICANT CHANGE UP (ref 3.8–10.5)
WBC # BLD: 48.4 K/UL — CRITICAL HIGH (ref 3.8–10.5)
WBC # BLD: 5.7 K/UL — SIGNIFICANT CHANGE UP (ref 3.8–10.5)
WBC # BLD: 5.9 K/UL — SIGNIFICANT CHANGE UP (ref 3.8–10.5)
WBC # BLD: 6.1 K/UL — SIGNIFICANT CHANGE UP (ref 3.8–10.5)
WBC # BLD: 6.2 K/UL — SIGNIFICANT CHANGE UP (ref 3.8–10.5)
WBC # BLD: 6.3 K/UL — SIGNIFICANT CHANGE UP (ref 3.8–10.5)
WBC # BLD: 6.6 K/UL — SIGNIFICANT CHANGE UP (ref 3.8–10.5)
WBC # BLD: 6.6 K/UL — SIGNIFICANT CHANGE UP (ref 3.8–10.5)
WBC # BLD: 6.7 K/UL — SIGNIFICANT CHANGE UP (ref 3.8–10.5)
WBC # BLD: 7.2 K/UL — SIGNIFICANT CHANGE UP (ref 3.8–10.5)
WBC # BLD: 7.4 K/UL — SIGNIFICANT CHANGE UP (ref 3.8–10.5)
WBC # BLD: 7.66 K/UL — SIGNIFICANT CHANGE UP (ref 3.8–10.5)
WBC # BLD: 8.2 K/UL — SIGNIFICANT CHANGE UP (ref 3.8–10.5)
WBC # BLD: 8.54 K/UL — SIGNIFICANT CHANGE UP (ref 3.8–10.5)
WBC # BLD: 8.6 K/UL — SIGNIFICANT CHANGE UP (ref 3.8–10.5)
WBC # BLD: 8.8 K/UL — SIGNIFICANT CHANGE UP (ref 3.8–10.5)
WBC # BLD: 9.5 K/UL — SIGNIFICANT CHANGE UP (ref 3.8–10.5)
WBC # FLD AUTO: 10.3 K/UL — SIGNIFICANT CHANGE UP (ref 3.8–10.5)
WBC # FLD AUTO: 11 K/UL — HIGH (ref 3.8–10.5)
WBC # FLD AUTO: 21.1 K/UL — HIGH (ref 3.8–10.5)
WBC # FLD AUTO: 21.6 K/UL — HIGH (ref 3.8–10.5)
WBC # FLD AUTO: 24.2 K/UL — HIGH (ref 3.8–10.5)
WBC # FLD AUTO: 29.8 K/UL — HIGH (ref 3.8–10.5)
WBC # FLD AUTO: 3.8 K/UL — SIGNIFICANT CHANGE UP (ref 3.8–10.5)
WBC # FLD AUTO: 30.4 K/UL — HIGH (ref 3.8–10.5)
WBC # FLD AUTO: 4 K/UL — SIGNIFICANT CHANGE UP (ref 3.8–10.5)
WBC # FLD AUTO: 4.2 K/UL — SIGNIFICANT CHANGE UP (ref 3.8–10.5)
WBC # FLD AUTO: 4.9 K/UL — SIGNIFICANT CHANGE UP (ref 3.8–10.5)
WBC # FLD AUTO: 48.4 K/UL — CRITICAL HIGH (ref 3.8–10.5)
WBC # FLD AUTO: 5.7 K/UL — SIGNIFICANT CHANGE UP (ref 3.8–10.5)
WBC # FLD AUTO: 5.9 K/UL — SIGNIFICANT CHANGE UP (ref 3.8–10.5)
WBC # FLD AUTO: 6.1 K/UL — SIGNIFICANT CHANGE UP (ref 3.8–10.5)
WBC # FLD AUTO: 6.2 K/UL — SIGNIFICANT CHANGE UP (ref 3.8–10.5)
WBC # FLD AUTO: 6.3 K/UL — SIGNIFICANT CHANGE UP (ref 3.8–10.5)
WBC # FLD AUTO: 6.6 K/UL — SIGNIFICANT CHANGE UP (ref 3.8–10.5)
WBC # FLD AUTO: 6.6 K/UL — SIGNIFICANT CHANGE UP (ref 3.8–10.5)
WBC # FLD AUTO: 6.7 K/UL — SIGNIFICANT CHANGE UP (ref 3.8–10.5)
WBC # FLD AUTO: 7.2 K/UL — SIGNIFICANT CHANGE UP (ref 3.8–10.5)
WBC # FLD AUTO: 7.4 K/UL — SIGNIFICANT CHANGE UP (ref 3.8–10.5)
WBC # FLD AUTO: 7.66 K/UL — SIGNIFICANT CHANGE UP (ref 3.8–10.5)
WBC # FLD AUTO: 8.2 K/UL — SIGNIFICANT CHANGE UP (ref 3.8–10.5)
WBC # FLD AUTO: 8.54 K/UL — SIGNIFICANT CHANGE UP (ref 3.8–10.5)
WBC # FLD AUTO: 8.6 K/UL — SIGNIFICANT CHANGE UP (ref 3.8–10.5)
WBC # FLD AUTO: 8.8 K/UL — SIGNIFICANT CHANGE UP (ref 3.8–10.5)
WBC # FLD AUTO: 9.5 K/UL — SIGNIFICANT CHANGE UP (ref 3.8–10.5)

## 2022-01-01 PROCEDURE — 85730 THROMBOPLASTIN TIME PARTIAL: CPT

## 2022-01-01 PROCEDURE — 93010 ELECTROCARDIOGRAM REPORT: CPT

## 2022-01-01 PROCEDURE — 77067 SCR MAMMO BI INCL CAD: CPT | Mod: 26

## 2022-01-01 PROCEDURE — 36561 INSERT TUNNELED CV CATH: CPT

## 2022-01-01 PROCEDURE — 70551 MRI BRAIN STEM W/O DYE: CPT | Mod: 26,MA

## 2022-01-01 PROCEDURE — 77067 SCR MAMMO BI INCL CAD: CPT

## 2022-01-01 PROCEDURE — 36415 COLL VENOUS BLD VENIPUNCTURE: CPT

## 2022-01-01 PROCEDURE — 77012 CT SCAN FOR NEEDLE BIOPSY: CPT | Mod: 26

## 2022-01-01 PROCEDURE — 74177 CT ABD & PELVIS W/CONTRAST: CPT | Mod: 26

## 2022-01-01 PROCEDURE — 76856 US EXAM PELVIC COMPLETE: CPT | Mod: 26

## 2022-01-01 PROCEDURE — 0042T: CPT

## 2022-01-01 PROCEDURE — 77080 DXA BONE DENSITY AXIAL: CPT | Mod: 26

## 2022-01-01 PROCEDURE — 99204 OFFICE O/P NEW MOD 45 MIN: CPT

## 2022-01-01 PROCEDURE — 88333 PATH CONSLTJ SURG CYTO XM 1: CPT | Mod: 26

## 2022-01-01 PROCEDURE — 76830 TRANSVAGINAL US NON-OB: CPT

## 2022-01-01 PROCEDURE — 88307 TISSUE EXAM BY PATHOLOGIST: CPT | Mod: 26

## 2022-01-01 PROCEDURE — G0378: CPT

## 2022-01-01 PROCEDURE — 70551 MRI BRAIN STEM W/O DYE: CPT | Mod: MA

## 2022-01-01 PROCEDURE — 77063 BREAST TOMOSYNTHESIS BI: CPT | Mod: 26

## 2022-01-01 PROCEDURE — 99396 PREV VISIT EST AGE 40-64: CPT | Mod: 25

## 2022-01-01 PROCEDURE — 77001 FLUOROGUIDE FOR VEIN DEVICE: CPT | Mod: 26

## 2022-01-01 PROCEDURE — 77063 BREAST TOMOSYNTHESIS BI: CPT

## 2022-01-01 PROCEDURE — 47000 NEEDLE BIOPSY OF LIVER PERQ: CPT

## 2022-01-01 PROCEDURE — U0003: CPT

## 2022-01-01 PROCEDURE — 88307 TISSUE EXAM BY PATHOLOGIST: CPT

## 2022-01-01 PROCEDURE — 70496 CT ANGIOGRAPHY HEAD: CPT | Mod: 26,MA

## 2022-01-01 PROCEDURE — 76856 US EXAM PELVIC COMPLETE: CPT

## 2022-01-01 PROCEDURE — 99214 OFFICE O/P EST MOD 30 MIN: CPT

## 2022-01-01 PROCEDURE — 85610 PROTHROMBIN TIME: CPT

## 2022-01-01 PROCEDURE — 77080 DXA BONE DENSITY AXIAL: CPT

## 2022-01-01 PROCEDURE — 93000 ELECTROCARDIOGRAM COMPLETE: CPT | Mod: 59

## 2022-01-01 PROCEDURE — 77012 CT SCAN FOR NEEDLE BIOPSY: CPT

## 2022-01-01 PROCEDURE — 70498 CT ANGIOGRAPHY NECK: CPT | Mod: 26,MA

## 2022-01-01 PROCEDURE — 76830 TRANSVAGINAL US NON-OB: CPT | Mod: 26

## 2022-01-01 PROCEDURE — G0444 DEPRESSION SCREEN ANNUAL: CPT | Mod: 59

## 2022-01-01 PROCEDURE — 99213 OFFICE O/P EST LOW 20 MIN: CPT

## 2022-01-01 PROCEDURE — 12345: CPT | Mod: NC

## 2022-01-01 PROCEDURE — 99232 SBSQ HOSP IP/OBS MODERATE 35: CPT

## 2022-01-01 PROCEDURE — 83036 HEMOGLOBIN GLYCOSYLATED A1C: CPT

## 2022-01-01 PROCEDURE — G0463: CPT

## 2022-01-01 PROCEDURE — 93306 TTE W/DOPPLER COMPLETE: CPT

## 2022-01-01 PROCEDURE — 99285 EMERGENCY DEPT VISIT HI MDM: CPT | Mod: 25

## 2022-01-01 PROCEDURE — 80053 COMPREHEN METABOLIC PANEL: CPT

## 2022-01-01 PROCEDURE — 88333 PATH CONSLTJ SURG CYTO XM 1: CPT

## 2022-01-01 PROCEDURE — 70450 CT HEAD/BRAIN W/O DYE: CPT | Mod: MA

## 2022-01-01 PROCEDURE — 93005 ELECTROCARDIOGRAM TRACING: CPT

## 2022-01-01 PROCEDURE — 99217: CPT

## 2022-01-01 PROCEDURE — 84480 ASSAY TRIIODOTHYRONINE (T3): CPT

## 2022-01-01 PROCEDURE — 74177 CT ABD & PELVIS W/CONTRAST: CPT

## 2022-01-01 PROCEDURE — 99291 CRITICAL CARE FIRST HOUR: CPT

## 2022-01-01 PROCEDURE — 71260 CT THORAX DX C+: CPT

## 2022-01-01 PROCEDURE — 70496 CT ANGIOGRAPHY HEAD: CPT | Mod: MA

## 2022-01-01 PROCEDURE — 84443 ASSAY THYROID STIM HORMONE: CPT

## 2022-01-01 PROCEDURE — 85025 COMPLETE CBC W/AUTO DIFF WBC: CPT

## 2022-01-01 PROCEDURE — 71260 CT THORAX DX C+: CPT | Mod: 26

## 2022-01-01 PROCEDURE — U0005: CPT

## 2022-01-01 PROCEDURE — 76937 US GUIDE VASCULAR ACCESS: CPT | Mod: 26

## 2022-01-01 PROCEDURE — 99222 1ST HOSP IP/OBS MODERATE 55: CPT

## 2022-01-01 PROCEDURE — 70498 CT ANGIOGRAPHY NECK: CPT | Mod: MA

## 2022-01-01 PROCEDURE — 80048 BASIC METABOLIC PNL TOTAL CA: CPT

## 2022-01-01 PROCEDURE — 99239 HOSP IP/OBS DSCHRG MGMT >30: CPT

## 2022-01-01 PROCEDURE — 87637 SARSCOV2&INF A&B&RSV AMP PRB: CPT

## 2022-01-01 PROCEDURE — 80061 LIPID PANEL: CPT

## 2022-01-01 PROCEDURE — 82962 GLUCOSE BLOOD TEST: CPT

## 2022-01-01 PROCEDURE — 84484 ASSAY OF TROPONIN QUANT: CPT

## 2022-01-01 PROCEDURE — 84436 ASSAY OF TOTAL THYROXINE: CPT

## 2022-01-01 PROCEDURE — 99215 OFFICE O/P EST HI 40 MIN: CPT

## 2022-01-01 RX ORDER — SIMVASTATIN 40 MG/1
40 TABLET, FILM COATED ORAL
Qty: 90 | Refills: 1 | Status: ACTIVE | COMMUNITY
Start: 2017-09-12 | End: 1900-01-01

## 2022-01-01 RX ORDER — ATORVASTATIN CALCIUM 80 MG/1
40 TABLET, FILM COATED ORAL AT BEDTIME
Refills: 0 | Status: DISCONTINUED | OUTPATIENT
Start: 2022-01-01 | End: 2022-01-01

## 2022-01-01 RX ORDER — ASPIRIN/CALCIUM CARB/MAGNESIUM 324 MG
1 TABLET ORAL
Qty: 30 | Refills: 0
Start: 2022-01-01 | End: 2022-01-01

## 2022-01-01 RX ORDER — POLYETHYLENE GLYCOL-3350, SODIUM CHLORIDE, POTASSIUM CHLORIDE AND SODIUM BICARBONATE 420; 11.2; 5.72; 1.48 G/438.4G; G/438.4G; G/438.4G; G/438.4G
420 POWDER, FOR SOLUTION ORAL
Qty: 1 | Refills: 0 | Status: ACTIVE | COMMUNITY
Start: 2022-01-01 | End: 1900-01-01

## 2022-01-01 RX ORDER — PANTOPRAZOLE SODIUM 20 MG/1
1 TABLET, DELAYED RELEASE ORAL
Qty: 0 | Refills: 0 | DISCHARGE

## 2022-01-01 RX ORDER — ACETAMINOPHEN 500 MG
650 TABLET ORAL ONCE
Refills: 0 | Status: DISCONTINUED | OUTPATIENT
Start: 2022-01-01 | End: 2022-01-01

## 2022-01-01 RX ORDER — ASPIRIN/CALCIUM CARB/MAGNESIUM 324 MG
81 TABLET ORAL DAILY
Refills: 0 | Status: DISCONTINUED | OUTPATIENT
Start: 2022-01-01 | End: 2022-01-01

## 2022-01-01 RX ORDER — ASPIRIN/CALCIUM CARB/MAGNESIUM 324 MG
1 TABLET ORAL
Qty: 0 | Refills: 0 | DISCHARGE

## 2022-01-01 RX ORDER — APIXABAN 2.5 MG/1
5 TABLET, FILM COATED ORAL
Refills: 0 | Status: DISCONTINUED | OUTPATIENT
Start: 2022-01-01 | End: 2022-01-01

## 2022-01-01 RX ORDER — DOCUSATE SODIUM 100 MG/1
100 CAPSULE ORAL TWICE DAILY
Qty: 60 | Refills: 2 | Status: ACTIVE | COMMUNITY
Start: 2022-01-01 | End: 1900-01-01

## 2022-01-01 RX ORDER — SENNOSIDES 8.6 MG/1
8.6 TABLET ORAL
Qty: 60 | Refills: 3 | Status: ACTIVE | COMMUNITY
Start: 2022-01-01 | End: 1900-01-01

## 2022-01-01 RX ORDER — LIRAGLUTIDE 6 MG/ML
0 INJECTION SUBCUTANEOUS
Qty: 0 | Refills: 0 | DISCHARGE

## 2022-01-01 RX ORDER — LIRAGLUTIDE 6 MG/ML
18 INJECTION, SOLUTION SUBCUTANEOUS
Qty: 1 | Refills: 0 | Status: DISCONTINUED | COMMUNITY
Start: 2021-08-17 | End: 2022-01-01

## 2022-01-01 RX ORDER — GABAPENTIN 300 MG/1
300 CAPSULE ORAL 3 TIMES DAILY
Qty: 90 | Refills: 3 | Status: ACTIVE | COMMUNITY
Start: 2022-01-01 | End: 1900-01-01

## 2022-01-01 RX ORDER — SIMVASTATIN 20 MG/1
1 TABLET, FILM COATED ORAL
Qty: 0 | Refills: 0 | DISCHARGE

## 2022-01-01 RX ORDER — ASPIRIN 81 MG
81 TABLET, DELAYED RELEASE (ENTERIC COATED) ORAL
Refills: 0 | Status: DISCONTINUED | COMMUNITY
End: 2022-01-01

## 2022-01-01 RX ORDER — TRAMADOL HYDROCHLORIDE 50 MG/1
50 TABLET, COATED ORAL
Qty: 28 | Refills: 0 | Status: ACTIVE | COMMUNITY
Start: 2022-01-01 | End: 1900-01-01

## 2022-01-01 RX ORDER — PANTOPRAZOLE 40 MG/1
40 TABLET, DELAYED RELEASE ORAL DAILY
Qty: 30 | Refills: 1 | Status: DISCONTINUED | COMMUNITY
Start: 2022-02-02 | End: 2022-01-01

## 2022-01-01 RX ORDER — ATORVASTATIN CALCIUM 80 MG/1
1 TABLET, FILM COATED ORAL
Qty: 30 | Refills: 0
Start: 2022-01-01 | End: 2022-01-01

## 2022-01-01 RX ORDER — PEN NEEDLE, DIABETIC 29 G X1/2"
31G X 8 MM NEEDLE, DISPOSABLE MISCELLANEOUS
Qty: 1 | Refills: 1 | Status: DISCONTINUED | COMMUNITY
Start: 2021-08-17 | End: 2022-01-01

## 2022-01-01 RX ADMIN — APIXABAN 5 MILLIGRAM(S): 2.5 TABLET, FILM COATED ORAL at 05:23

## 2022-01-01 RX ADMIN — Medication 81 MILLIGRAM(S): at 08:52

## 2022-01-01 RX ADMIN — APIXABAN 5 MILLIGRAM(S): 2.5 TABLET, FILM COATED ORAL at 19:01

## 2022-01-01 RX ADMIN — ATORVASTATIN CALCIUM 40 MILLIGRAM(S): 80 TABLET, FILM COATED ORAL at 21:20

## 2022-01-01 RX ADMIN — Medication 81 MILLIGRAM(S): at 13:26

## 2022-01-01 RX ADMIN — ATORVASTATIN CALCIUM 40 MILLIGRAM(S): 80 TABLET, FILM COATED ORAL at 22:14

## 2022-01-01 RX ADMIN — APIXABAN 5 MILLIGRAM(S): 2.5 TABLET, FILM COATED ORAL at 04:55

## 2022-02-02 ENCOUNTER — APPOINTMENT (OUTPATIENT)
Dept: FAMILY MEDICINE | Facility: CLINIC | Age: 57
End: 2022-02-02
Payer: COMMERCIAL

## 2022-02-02 VITALS
TEMPERATURE: 98 F | SYSTOLIC BLOOD PRESSURE: 136 MMHG | HEART RATE: 78 BPM | HEIGHT: 63 IN | RESPIRATION RATE: 16 BRPM | WEIGHT: 209 LBS | OXYGEN SATURATION: 99 % | BODY MASS INDEX: 37.03 KG/M2 | DIASTOLIC BLOOD PRESSURE: 80 MMHG

## 2022-02-02 DIAGNOSIS — K29.70 GASTRITIS, UNSPECIFIED, W/OUT BLEEDING: ICD-10-CM

## 2022-02-02 DIAGNOSIS — R10.9 UNSPECIFIED ABDOMINAL PAIN: ICD-10-CM

## 2022-02-02 LAB
BILIRUB UR QL STRIP: NEGATIVE
CLARITY UR: CLEAR
COLLECTION METHOD: NORMAL
GLUCOSE UR-MCNC: NEGATIVE
HCG UR QL: 0.2 EU/DL
HGB UR QL STRIP.AUTO: NEGATIVE
KETONES UR-MCNC: NEGATIVE
LEUKOCYTE ESTERASE UR QL STRIP: NEGATIVE
NITRITE UR QL STRIP: NEGATIVE
PH UR STRIP: 5.5
PROT UR STRIP-MCNC: NEGATIVE
SP GR UR STRIP: 1.01

## 2022-02-02 PROCEDURE — 36415 COLL VENOUS BLD VENIPUNCTURE: CPT

## 2022-02-02 PROCEDURE — 99214 OFFICE O/P EST MOD 30 MIN: CPT | Mod: 25

## 2022-02-02 PROCEDURE — 81003 URINALYSIS AUTO W/O SCOPE: CPT | Mod: QW

## 2022-02-04 LAB
ALBUMIN SERPL ELPH-MCNC: 4.6 G/DL
ALP BLD-CCNC: 141 U/L
ALT SERPL-CCNC: 21 U/L
ANION GAP SERPL CALC-SCNC: 10 MMOL/L
AST SERPL-CCNC: 26 U/L
BASOPHILS # BLD AUTO: 0.07 K/UL
BASOPHILS NFR BLD AUTO: 0.9 %
BILIRUB SERPL-MCNC: 0.4 MG/DL
BUN SERPL-MCNC: 8 MG/DL
CALCIUM SERPL-MCNC: 10.4 MG/DL
CHLORIDE SERPL-SCNC: 104 MMOL/L
CO2 SERPL-SCNC: 26 MMOL/L
CREAT SERPL-MCNC: 0.55 MG/DL
EOSINOPHIL # BLD AUTO: 0.37 K/UL
EOSINOPHIL NFR BLD AUTO: 4.8 %
GLUCOSE SERPL-MCNC: 90 MG/DL
HCT VFR BLD CALC: 40.9 %
HGB BLD-MCNC: 12.7 G/DL
IMM GRANULOCYTES NFR BLD AUTO: 0.3 %
LYMPHOCYTES # BLD AUTO: 2.34 K/UL
LYMPHOCYTES NFR BLD AUTO: 30.4 %
MAN DIFF?: NORMAL
MCHC RBC-ENTMCNC: 24.9 PG
MCHC RBC-ENTMCNC: 31.1 GM/DL
MCV RBC AUTO: 80.2 FL
MONOCYTES # BLD AUTO: 0.65 K/UL
MONOCYTES NFR BLD AUTO: 8.4 %
NEUTROPHILS # BLD AUTO: 4.26 K/UL
NEUTROPHILS NFR BLD AUTO: 55.2 %
PLATELET # BLD AUTO: 305 K/UL
POTASSIUM SERPL-SCNC: 4.4 MMOL/L
PROT SERPL-MCNC: 7.3 G/DL
RBC # BLD: 5.1 M/UL
RBC # FLD: 13.2 %
SODIUM SERPL-SCNC: 140 MMOL/L
WBC # FLD AUTO: 7.71 K/UL

## 2022-02-04 NOTE — ASSESSMENT
[FreeTextEntry1] : Abdominal Pian\par ? gastritis/ r/o gallbladder Dz\par Pantoprazole Rx given\par See GI\par CBC/ Comprehensive \par US abdomen ASAP

## 2022-02-04 NOTE — HISTORY OF PRESENT ILLNESS
[Moderate] : moderate [___ Weeks ago] : [unfilled] weeks ago [Constant] : constant [Abdominal Pain] : abdominal pain [Stable] : stable [Nausea] : no nausea [Vomiting] : no vomiting [Diarrhea] : no diarrhea [Constipation] : no constipation [Anorexia] : no anorexia [Sore Throat] : no sore throat [de-identified] : upper mid and lesft and right sided pain [FreeTextEntry5] : eating [FreeTextEntry8] : States she is doing a cleanse constantly. She never takes a break like the cleanse suggests\par Hx of Ulcer. Currently taking TUMS \par It helps in the morning

## 2022-02-06 ENCOUNTER — OUTPATIENT (OUTPATIENT)
Dept: OUTPATIENT SERVICES | Facility: HOSPITAL | Age: 57
LOS: 1 days | End: 2022-02-06
Payer: COMMERCIAL

## 2022-02-06 ENCOUNTER — APPOINTMENT (OUTPATIENT)
Dept: ULTRASOUND IMAGING | Facility: CLINIC | Age: 57
End: 2022-02-06
Payer: COMMERCIAL

## 2022-02-06 DIAGNOSIS — R10.9 UNSPECIFIED ABDOMINAL PAIN: ICD-10-CM

## 2022-02-06 DIAGNOSIS — Z98.89 OTHER SPECIFIED POSTPROCEDURAL STATES: Chronic | ICD-10-CM

## 2022-02-06 DIAGNOSIS — Z90.710 ACQUIRED ABSENCE OF BOTH CERVIX AND UTERUS: Chronic | ICD-10-CM

## 2022-02-06 PROCEDURE — 76700 US EXAM ABDOM COMPLETE: CPT | Mod: 26

## 2022-02-06 PROCEDURE — 76700 US EXAM ABDOM COMPLETE: CPT

## 2022-02-07 ENCOUNTER — RESULT REVIEW (OUTPATIENT)
Age: 57
End: 2022-02-07

## 2022-02-07 DIAGNOSIS — R93.89 ABNORMAL FINDINGS ON DIAGNOSTIC IMAGING OF OTHER SPECIFIED BODY STRUCTURES: ICD-10-CM

## 2022-02-09 PROBLEM — I81 THROMBOSIS, PORTAL VEIN: Status: ACTIVE | Noted: 2022-01-01

## 2022-02-10 NOTE — HISTORY OF PRESENT ILLNESS
[de-identified] : 57 yo F with h/o hyperlipemia, hypercholesterolemia, obesity and thyroid cancer who was f/w metastatic disease of unknown primary in February.\par \par She saw her PCP, Dr. Delvalle, c/o abdominal painon 2/8/22.  An abdominal U/S 2/6/22 showed multiple new hepatic lesions consistent with metastatic disease with evidence of periportal adenopathy without definitive pancreatic lesions seen.\par \par Follow up CT CAP 2/8/22 showed diffuse carcinomatosis with bulky omental implants predominantly in the right side of the abdomen. Prominence of the right adnexa measuring 3.5 x 3.7 cm. This may represent a primary ovarian carcinoma although this is not as large is typically seen with this degree of disease and other primary neoplasm may also be considered. Due to the large liver masses, suggestion of thrombus in the right portal vein and periportal lymphadenopathy, cholangiocarcinoma is also considered.\par Interval development of a few small pulmonary nodules may represent early metastatic disease.\par \par Pt is pain free currently. Eating well. BMs are normal. Denies unintentional weight loss. No family hx of malignancy. Pt is up to date on mammogram, pap smear. Never had a colonoscopy.\par \par \par Denies HA. CP, SOB, abd pain, constipation, diarrhea, melena, hematuria, dysuria.

## 2022-02-10 NOTE — RESULTS/DATA
[FreeTextEntry1] : 2/8/22 CT CAP:\par Diffuse carcinomatosis with bulky omental implants predominantly in the right side of the abdomen. Prominence of the right adnexa measuring 3.5 x 3.7 cm. This may represent a primary ovarian carcinoma although this is not as large is typically seen with this degree of disease and other primary neoplasm may also be considered. Due to the large liver masses, suggestion of thrombus in the right portal vein and periportal lymphadenopathy, cholangiocarcinoma is also considered.\par Interval development of a few small pulmonary nodules may represent early metastatic disease.\par \par 2/6/22 U/S abdomen :\par When compared to prior CT scan there are multiple new hepatic lesions consistent with metastatic disease with evidence of periportal adenopathy without definitive pancreatic lesions seen. Given these findings are strongly recommend chest abdomen pelvic CT for further evaluation for primary source

## 2022-02-10 NOTE — ASSESSMENT
[FreeTextEntry1] : 57 yo F with h/o hyperlipemia, hypercholesterolemia, obesity and thyroid cancer who was f/w metastatic disease of unknown primary in February 2022.\par \par # metastatic malignancy workup\par - CT CAP 2/8/22 showed diffuse carcinomatosis with bulky omental implants predominantly in the right side of the abdomen. Prominence of the right adnexa measuring 3.5 x 3.7 cm. This may represent a primary ovarian carcinoma although this is not as large is typically seen with this degree of disease and other primary neoplasm may also be considered. Due to the large liver masses, suggestion of thrombus in the right portal vein and periportal lymphadenopathy, cholangiocarcinoma is also considered.\par -discussed with patient and  that treatment of metastatic cancer will be systemic chemotherapy after diagnosis is made via bx\par -will obtain lIR guided liver bx\par -check , CEA, Ca19-9\par \par \par RTC after bx to discuss treatment plan

## 2022-02-15 PROBLEM — Z80.0 FAMILY HISTORY OF LIVER CANCER: Status: ACTIVE | Noted: 2022-01-01

## 2022-02-15 PROBLEM — R93.2 ABNORMAL CT SCAN, LIVER: Status: ACTIVE | Noted: 2022-01-01

## 2022-02-15 PROBLEM — R91.8 ABNORMAL CT SCAN, LUNG: Status: ACTIVE | Noted: 2022-01-01

## 2022-02-15 PROBLEM — Z85.850 HISTORY OF MALIGNANT NEOPLASM OF THYROID: Status: RESOLVED | Noted: 2018-01-20 | Resolved: 2022-01-01

## 2022-02-15 PROBLEM — R93.3 ABNORMAL CT SCAN, GASTROINTESTINAL TRACT: Status: ACTIVE | Noted: 2022-01-01

## 2022-02-15 PROBLEM — R10.11 COLICKY RUQ ABDOMINAL PAIN: Status: ACTIVE | Noted: 2022-01-01

## 2022-02-15 PROBLEM — Z86.39 HISTORY OF HYPERCHOLESTEROLEMIA: Status: RESOLVED | Noted: 2017-04-17 | Resolved: 2022-01-01

## 2022-02-15 NOTE — PHYSICAL EXAM
[General Appearance - Alert] : alert [General Appearance - In No Acute Distress] : in no acute distress [Sclera] : the sclera and conjunctiva were normal [PERRL With Normal Accommodation] : pupils were equal in size, round, and reactive to light [Extraocular Movements] : extraocular movements were intact [Outer Ear] : the ears and nose were normal in appearance [Oropharynx] : the oropharynx was normal [Neck Appearance] : the appearance of the neck was normal [Neck Cervical Mass (___cm)] : no neck mass was observed [Jugular Venous Distention Increased] : there was no jugular-venous distention [Thyroid Diffuse Enlargement] : the thyroid was not enlarged [Thyroid Nodule] : there were no palpable thyroid nodules [Auscultation Breath Sounds / Voice Sounds] : lungs were clear to auscultation bilaterally [Heart Rate And Rhythm] : heart rate was normal and rhythm regular [Heart Sounds] : normal S1 and S2 [Heart Sounds Gallop] : no gallops [Murmurs] : no murmurs [Heart Sounds Pericardial Friction Rub] : no pericardial rub [Bowel Sounds] : normal bowel sounds [Abdomen Soft] : soft [Abdomen Mass (___ Cm)] : no abdominal mass palpated [Diffuse] : diffusely [Epigastric] : in the epigastric area [RUQ] : in the right upper quadrant [FreeTextEntry1] : Vague diffuse epigastric and right upper quadrant tenderness.  No discrete hepatomegaly.  No hernia.  No guarding or rebound. [Cervical Lymph Nodes Enlarged Posterior Bilaterally] : posterior cervical [Cervical Lymph Nodes Enlarged Anterior Bilaterally] : anterior cervical [Supraclavicular Lymph Nodes Enlarged Bilaterally] : supraclavicular [Axillary Lymph Nodes Enlarged Bilaterally] : axillary [Femoral Lymph Nodes Enlarged Bilaterally] : femoral [Inguinal Lymph Nodes Enlarged Bilaterally] : inguinal [No CVA Tenderness] : no ~M costovertebral angle tenderness [No Spinal Tenderness] : no spinal tenderness [Abnormal Walk] : normal gait [Nail Clubbing] : no clubbing  or cyanosis of the fingernails [Musculoskeletal - Swelling] : no joint swelling seen [Motor Tone] : muscle strength and tone were normal [Skin Color & Pigmentation] : normal skin color and pigmentation [Skin Turgor] : normal skin turgor [] : no rash

## 2022-02-15 NOTE — ASSESSMENT
[FreeTextEntry1] : Widely metastatic disease intra-abdominally.  Multiple liver metastases.  Periportal necrotic adenopathy notable.  Unclear if patient has portal vein thrombosis.  Anterior branch of right portal vein may be replaced by tumor.  Right ovarian cyst noted.  Appendix reported to be normal.  No overt involvement of stomach or colon.  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  Unclear if HP was ever definitively resolved.\par Distant history of thyroid cancer status post partial thyroidectomy distantly.\par Possible primaries include ovary, primary peritoneal carcinoma, possible gastric neoplasia,  possible tiny pancreatic primary.  Possible metastatic thyroid disease.  Unlikely to be due to colon cancer given current extent of disease.  Possible rectal neoplasia also felt to be unlikely.\par \par Plan await previously arranged liver biopsy for 1/25 at [facility].  Advised to hold Eliquis for 2 solid days before liver biopsy.\par We will tentatively schedule for endoscopy and colonoscopy if a GI primary is suggested on liver pathology.\par Procedures, risk benefits and prep, were explained to the patient, who understands and is agreeable to proceed.  ASA #3.  All  current blood work is acceptable.  Eliquis would need to be held for 2 complete days prior to any endoscopic procedures/biopsies.  Gavilyte prep to be utilized.  All to be taken on preparation day.  No clearances required.  Arrangements made.  Results to follow.  Mallampati #3.

## 2022-02-15 NOTE — REASON FOR VISIT
[Consultation] : a consultation visit [FreeTextEntry1] : Right upper quadrant discomfort, slowly progressive times few months.  Abnormal CAT scan

## 2022-02-15 NOTE — HISTORY OF PRESENT ILLNESS
[FreeTextEntry1] : 56-year-old female with hyperlipidemia obesity distant history of thyroid cancer status post lobectomy in 1998.  Also distant history of gastric ulcer via endoscopy done 15 to 20 years ago.  H pylori was identified and treated.  Unclear if test of cure was ever performed.  No recent history of peptic ulcer disease or symptomatology thereof.\par Also history of a hysterectomy done in 2015 for uterine fibroids.  Ovaries not removed.\par \par Recently had complained of vague diffuse right upper quadrant pain and epigastric pain.  She had noticed about a 20 pound weight loss over several months.  She had symptoms of early satiety but no dyspepsia.  No heartburn or regurgitation.  No dysphagia or diet aphasia.  No overt bleeding.  Movements have remained normal.  Occurring about once daily.  Denies nausea vomiting jaundice or fever.  She was referred for sonogram which revealed multiple large hepatic metastases.  Subsequently CAT scan abdomen and pelvis and chest with oral and IV contrast was performed.  Multiple large and small liver metastases are noted.  Multiple peritoneal nodules/lesions are noted.  3.5 cm cystic process in the region of the right ovary noted.  Periportal lymphadenopathy, necrotic noted.  Questionable occlusion/replacement of the superior branch of the right portal vein notable.  Initially read as portal vein thrombosis.  However portal vein right branches left branches are all distinct.  No overt pancreatic neoplasm.  PD is normal.  Most of peritoneal mets are on the right side.  No ascites.  No splenomegaly.  No bowel obstruction.  No gross colonic pathology.  No gross gastric pathology.\par \par Initially seen by oncology and surgical oncology.  Blood work reveals that CBC and electrolyte panel are normal.  Mild elevation of alk phos about 135 noted.  Transaminases were normal.  Hepatitis profiles were negative.  Coagulation profiles were normal.  Tumor markers from pancreas elevated i.e. CA 19-9 and CA-125.  CEA is normal.

## 2022-02-17 NOTE — ED PROVIDER NOTE - ATTENDING CONTRIBUTION TO CARE
I, Emiliano Oliva, personally saw the patient with the resident, and completed the key components of the history and physical exam. I then discussed the management plan with the resident.    Upon my evaluation, this patient had a high probability of imminent or life-threatening deterioration due to _stroke _ , which required my direct attention, intervention, and personal management.    55 yo F hx of multiple lesion in the body (being worked up for metastatic lesion) on Eliquist p/w left sided face, arm, and leg numbness after taking Eliquist around 7:30 am. symptoms resolving upon arrival. no focal neurological deficit. NIHSS 0. CT head, CTA heaa and neck, CT perfusion negative for ischemic stroke. patient placed in Obs for MRI.     I have personally provided _35__ minutes of critical care time exculsive of time spent on separately billable procedures.  Time includes review of laboratory data, radiology results, discussion with consultants, and monitoring for potential decompensation.  Interventions were performed as documented.

## 2022-02-17 NOTE — ED PROVIDER NOTE - PHYSICAL EXAMINATION
General: well appearing, NAD  Head: NC, AT  EENT: no scleral icterus  Cardiac: RRR, no apparent murmurs, no lower extremity edema  Respiratory: CTABL, no respiratory distress   Abdomen: soft, ND, NT, nonperitonitic  MSK/Vascular: full ROM, soft compartments, warm extremities  Neuro: AAOx3, cranial nerves intact, strength and sensation intact throughout, normal gait  Psych: calm, cooperative

## 2022-02-17 NOTE — ED CDU PROVIDER INITIAL DAY NOTE - PROGRESS NOTE DETAILS
Pt's MRi is negative . pt has no symptoms . neology recommended the admission   called for admission Dr valladares differ admission at this point . he consult from medicine [facility]

## 2022-02-17 NOTE — CONSULT NOTE ADULT - SUBJECTIVE AND OBJECTIVE BOX
Neurology consult    CLARENCE FELICIANO 56y Female     Patient is a 56y old  Female who presents with a chief complaint of     HPI:      REVIEW OF SYSTEMS:    Constitutional: No fever, chills, fatigue, weakness  Eyes: no eye pain, visual disturbances, or discharge  ENT:  No difficulty hearing, tinnitus, vertigo; No sinus or throat pain  Neck: No pain or stiffness  Respiratory: No cough, dyspnea, wheezing   Cardiovascular: No chest pain, palpitations,   Gastrointestinal: No abdominal or epigastric pain. No nausea, vomiting  No diarrhea or constipation.   Genitourinary: No dysuria, frequency, hematuria or incontinence  Neurological: No headaches, lightheadedness, vertigo, numbness or tremors  Psychiatric: No depression, anxiety, mood swings or difficulty sleeping  Musculoskeletal: No joint pain or swelling; No muscle, back or extremity pain  Skin: No itching, burning, rashes or lesions   Lymph Nodes: No enlarged glands  Endocrine: No heat or cold intolerance; No hair loss, No h/o diabetes or thyroid dysfunction  Allergy and Immunologic: No hives or eczema      PMH: Dyslipidemia         PSH: H/O partial thyroidectomy    H/O: hysterectomy          FAMILY HISTORY:  Family history of hypertension (Father)        SOCIAL HISTORY:  No history of tobacco or alcohol use     Allergies    No Known Allergies    Intolerances        Height (cm): 160 (02-17 @ 09:00)  Weight (kg): 90.2 (02-17 @ 09:08)  BMI (kg/m2): 35.2 (02-17 @ 09:08)    Vital Signs Last 24 Hrs  T(C): 37 (17 Feb 2022 19:29), Max: 37 (17 Feb 2022 09:00)  T(F): 98.6 (17 Feb 2022 19:29), Max: 98.6 (17 Feb 2022 09:00)  HR: 77 (17 Feb 2022 19:29) (73 - 80)  BP: 135/81 (17 Feb 2022 19:29) (125/82 - 149/90)  BP(mean): --  RR: 18 (17 Feb 2022 19:29) (18 - 18)  SpO2: 98% (17 Feb 2022 19:29) (98% - 99%)  MEDICATIONS    apixaban 5 milliGRAM(s) Oral two times a day  atorvastatin 40 milliGRAM(s) Oral at bedtime        LABS:  CBC Full  -  ( 17 Feb 2022 09:23 )  WBC Count : 8.54 K/uL  RBC Count : 5.22 M/uL  Hemoglobin : 13.1 g/dL  Hematocrit : 41.6 %  Platelet Count - Automated : 313 K/uL  Mean Cell Volume : 79.7 fl  Mean Cell Hemoglobin : 25.1 pg  Mean Cell Hemoglobin Concentration : 31.5 gm/dL  Auto Neutrophil # : 5.26 K/uL  Auto Lymphocyte # : 2.04 K/uL  Auto Monocyte # : 0.79 K/uL  Auto Eosinophil # : 0.35 K/uL  Auto Basophil # : 0.06 K/uL  Auto Neutrophil % : 61.5 %  Auto Lymphocyte % : 23.9 %  Auto Monocyte % : 9.3 %  Auto Eosinophil % : 4.1 %  Auto Basophil % : 0.7 %      02-17    140  |  102  |  5.6<L>  ----------------------------<  106<H>  4.3   |  27.0  |  0.51    Ca    10.0      17 Feb 2022 09:23    TPro  7.9  /  Alb  4.4  /  TBili  0.7  /  DBili  x   /  AST  30  /  ALT  14  /  AlkPhos  147<H>  02-17    LIVER FUNCTIONS - ( 17 Feb 2022 09:23 )  Alb: 4.4 g/dL / Pro: 7.9 g/dL / ALK PHOS: 147 U/L / ALT: 14 U/L / AST: 30 U/L / GGT: x           Hemoglobin A1C:       PT/INR - ( 17 Feb 2022 09:23 )   PT: 16.9 sec;   INR: 1.48 ratio         PTT - ( 17 Feb 2022 09:23 )  PTT:41.2 sec      On Neurological Examination:    Mental Status - Patient is  awake, alert and oriented X3.  Speech is fluent. Patient can name, repeat and follow commands correctly  There is no dysarthria.    Cranial Nerves - PERRL, EOMI,  Visual fields are full to finger counting, no gross facial asymmetry, tongue/uvula midline    Motor Exam -   Right upper ---5/5 No drift  Left upper ---5/5 No drift  Right lower ---5/5 No drift  Left lower  ---5/5 No drift     nml bulk/tone    Sensory    Intact to light touch and pinprick bilaterally but subjectively reports left sided numbness.    Coord: FTN intact bilaterally     Gait -  Not assessed.     Reflexes:                       NIH SS:   Date: 02-17-22  Time:  1030  1a) Level of consciousness (0-3):   1b) Questions (0-2):   1c) Commands (0-2):   2  ) Gaze (0-2):   3  ) Visual field (0-3):   4  ) Facial palsy (0-3):   Motor  5a) Left arm (0-4):   5b) Right arm (0-4):   6a) Left leg (0-4):   6b) Right leg (0-4):   7  ) Ataxia (0-2):   Sensory  8  ) Sensory (0-2):   Speech  9  ) Language (0-3):   10) Dysarthria (0-2):   Extinction  11) Extinction/inattention (0-2):     Total score: = 0    Patient was last seen well at 730 am      Prestroke Modified Narda: = 0    RADIOLOGY ( All neurological imaging studies were independently reviewed and interpreted by me)  [facility]    CT Brain Stroke Protocol (02.17.22 @ 09:25) >  IMPRESSION: No evidence of an acute transcortical infarction or   hemorrhage. Final discussed with Dr. Oliva at 9:20 AM        CTA  CTP     CT Angio Neck w/ IV Cont (02.17.22 @ 09:30) >  Negative CTP. No intracranial large vessel occlusion.      MRI:    TTE

## 2022-02-17 NOTE — ED PROVIDER NOTE - OBJECTIVE STATEMENT
55 y/o F with PMHx metastatic cancer? who presents to the ED c/o of numbness to her left side starting at 0730 this morning. Patient states that she was sitting at her computer getting ready to work when she began to feel her L leg become numb which then progressed to her L arm. She states that the episode resolved and that she feels like her normal self now. She states that she takes Eliquis. She states that she is in the process of getting biopsies for lesions in multiple parts of her body including her liver and gallbladder. Denies any other complaints at this time.

## 2022-02-17 NOTE — ED ADULT TRIAGE NOTE - CHIEF COMPLAINT QUOTE
pt c/o numbness to left knee down, now c/o numbness to left arm, on Eloquist, spoke with MD told to come to ED  started this am 730am,

## 2022-02-17 NOTE — ED ADULT NURSE NOTE - CHIEF COMPLAINT QUOTE
pt c/o numbness to left knee down, now c/o numbness to left arm, on Eliquis, spoke with MD told to come to ED  started this am 730am,

## 2022-02-17 NOTE — ED ADULT NURSE REASSESSMENT NOTE - NIH STROKE SCALE: 1A. LEVEL OF CONSCIOUSNESS, QM
----- Message from Corinne Fletcher NP sent at 9/22/2019  7:12 PM CDT -----  Kidney function remains weak and your urine shows protein so it's important to make sure you blood pressure is well controlled and you follow up with Dr. Shultz. Cholesterol levels are well controlled- continue current medication. Liver, thyroid and blood count are normal. No infection on final urine culture. Repeat CMP, lipids in 6 months  
(0) Alert; keenly responsive
(0) Alert; keenly responsive

## 2022-02-17 NOTE — ED PROVIDER NOTE - CLINICAL SUMMARY MEDICAL DECISION MAKING FREE TEXT BOX
57 y/o F with PMHx metastatic cancer? who presents to the ED c/o of numbness to her left side starting at 0730 this morning. Now resolved. Code Stroke called. CT scans negative. Plan to admit to obs for MRI.

## 2022-02-17 NOTE — ED CDU PROVIDER INITIAL DAY NOTE - NS ED ROS FT
No fever/chills, No photophobia/eye pain/changes in vision, No ear pain/sore throat/dysphagia, No chest pain/palpitations, no SOB/cough/wheeze/stridor, No abdominal pain, No N/V/D, no dysuria/frequency/discharge, No neck/back pain, no rash, +numbness to LUE and LLE

## 2022-02-17 NOTE — ED CDU PROVIDER INITIAL DAY NOTE - ATTENDING CONTRIBUTION TO CARE
I, Frances Leon, participated in the care of this patient with the PA. I discussed the history and physical exam findings as well as lab results and plan of care with the PA. I agree with PA's history, physical and assessment.     57 y/o F with recent diagnosis of metastatic cancer presented for left sided upper and lower extremity numbness at 0730 this morning that has since resolved. She was recently started on Eliquis - pending Bx results. She was seen by neurology after her initial ED presentation who recommended echo and MRI. Of note, patient ran out of her simvastatin over a month ago.    AP - NAD, well appearing, CN II-XII symmetrically intact, no pronator drift, no LE drift, symmetrical upper and lower extremity sensation, symmetrical facial sensation.    Pending MRI and echo.

## 2022-02-17 NOTE — PHARMACOTHERAPY INTERVENTION NOTE - COMMENTS
Code Stroke  On Eliquis, took last dose this AM 2/17 Code Stroke  On Eliquis, took last dose this AM 2/17    Called pharmacy to obtain medication list, [facility] [phone]. Spoke to patient at bedside to confirm medications: Eliquis 5mg BID, and Simvastatin 40mg QHS. No over the counter medications, herbals, supplements, topicals, or ophthalmics.

## 2022-02-17 NOTE — CONSULT NOTE ADULT - ASSESSMENT
IMPRESSION:  -R/O right hemispheric subcortical infarct.          ASSESSMENT/ PLAN:     - Admit to inpatient hospitalist service.  - Neuro checks and vital signs Q 2 hours.  - SBP goal permissive upto 160/100 for 24 hours then keep normotensive.  - ASA 81 mg PO or 300 VA QD.  - Lipitor for LDL goal of < 70 .  - Telemetry monitoring.  - CT/CTA/CTP-- report as above- images were reviewed.  - MRI Brain stroke protocol.  - Check fasting Lipid panel and HbA1c  - TTE with bubble study.  - PT OT SLP evaluation.  - SCD/ SQ Lovenox for DVT prophylaxis.

## 2022-02-17 NOTE — ED PROVIDER NOTE - NS ED ROS FT
Constitutional: no fever, no chills  Head: NC, AT   Eyes: no redness   ENMT: no nasal congestion/drainage, no sore throat   CV: no chest pain, no edema  Resp: no cough, no dyspnea  GI: no abdominal pain, no nausea, no vomiting, no diarrhea  : no dysuria, no hematuria   Skin: no lesions, no rashes   Neuro: no LOC, no headache,+ weakness

## 2022-02-18 NOTE — H&P ADULT - HISTORY OF PRESENT ILLNESS
55yo F with PMHx HLD, recent diagnosis of Ovarian/liver lesions that is scheduled for biopsy next week, on Eliquis for clot  in her live on imaging presented to ED c/o  left leg numbness onset 2hrs prior to coming to ER. Pt states that she sat down at her desk to work when suddenly felt numbness to her entire left leg following by tinglings that moved up to her left UE and left sided face. She denies slurred speech, focal weakness or prior personal hx of stroke. Pt reports that symptoms resolved 1hrs after she arrived in the ER. Pt called her oncology immediately who told her to come to ER. Denies cp, sob, palpitations, syncope, fever, or chills. Code stroke was activated in the ER, CT imaging negative.

## 2022-02-18 NOTE — DISCHARGE NOTE NURSING/CASE MANAGEMENT/SOCIAL WORK - NSDCPEPTSTROKESIGNS_GEN_ALL_CORE
Sudden numbness or weakness of the face, arm, or leg, especially on one side of the body. Confusion, trouble speaking or understanding. Trouble seeing in one or both eyes. Trouble walking, dizziness, loss of balance or coordination. Severe headache.
detailed exam

## 2022-02-18 NOTE — PROGRESS NOTE ADULT - ASSESSMENT
IMPRESSION:  -R/O  R hemispheric TIA  ASSESSMENT/ PLAN:     - Neuro checks and vital signs Q 4 hours.  - SBP goal normotensive.  - On ASA 81 mg PO QD and Eliquis 5 BID  - Lipitor for LDL goal of < 70 .  - Telemetry monitoring.  - CT/CTA/CTP-- report as above- images were reviewed.  - MRI Brain - no acute infarct.  -  PT OT   -  SCD/ Eliquis for DVT prophylaxis.

## 2022-02-18 NOTE — ED CDU PROVIDER DISPOSITION NOTE - CLINICAL COURSE
57 y/o F with PMHx metastatic cancer? who presents to the ED c/o of numbness to her left side starting at 0730 this morning. Patient states that she was sitting at her computer getting ready to work when she began to feel her L leg become numb which then progressed to her L arm. She states that the episode resolved and that she feels like her normal self now. pt had Ct scan done and was negative . MRI W.o any signs of stroke . pt denies any N/T or weakness In UE or LE limbs . medicine admission request by neuro . will admi the pt for further work up

## 2022-02-18 NOTE — PROGRESS NOTE ADULT - ASSESSMENT
55yo F with PMHx HLD, recent diagnosis of Ovarian/liver lesions that is scheduled for biopsy next week, on Eliquis for clot  in her liver on imaging presented to ED c/o  left leg numbness onset 2hrs prior to coming to ER.     Left sided numbness   -likely TIA, CTs negative, MRI negative   -no acute neurological deficit on exam   -admit to telemetry   -neurochecks Q4hrs, cont tele monitor   -TTE with bubble study order as per neurology   -cont with asa, statins   -cont with home eliquis   -check lipid panel and A1c   -Neurology following     ?Liver clot   -cont home Eliquis      HLD  -cont with statin     DVT   -on Eliquis    57yo F with PMHx HLD, recent diagnosis of Ovarian/liver lesions that is scheduled for biopsy next week, on Eliquis for clot  in her liver on imaging presented to ED c/o  left leg numbness onset 2hrs prior to coming to ER.     Left sided numbness   -likely TIA, CTs negative, MRI negative   -no acute neurological deficit on exam   -neurochecks Q4hrs, cont tele monitor   -TTE with bubble study ordered as per neurology   -cont with asa, statins   -cont with home eliquis   -lipid panel reviewed and A1c 5.7   -Neurology following     ?Liver clot   -cont home Eliquis      HLD  -cont with statin     DVT   -on Eliquis     Dispo - pending TTE

## 2022-02-18 NOTE — H&P ADULT - NSHPPHYSICALEXAM_GEN_ALL_CORE
T(C): 36.9 (02-17-22 @ 23:35), Max: 37 (02-17-22 @ 09:00)  HR: 80 (02-17-22 @ 23:35) (73 - 80)  BP: 132/77 (02-17-22 @ 23:35) (125/82 - 149/90)  RR: 18 (02-17-22 @ 23:35) (18 - 18)  SpO2: 97% (02-17-22 @ 23:35) (97% - 99%)    GENERAL: patient appears well, no acute distress, appropriate, pleasant  EYES: sclera clear, no exudates  ENMT: oropharynx clear without erythema, no exudates, moist mucous membranes  NECK: supple, soft, no thyromegaly noted  LUNGS: good air entry bilaterally, clear to auscultation, symmetric breath sounds, no wheezing or rhonchi appreciated  HEART: soft S1/S2, regular rate and rhythm, no murmurs noted, no lower extremity edema  GASTROINTESTINAL: abdomen is soft, nontender, nondistended, normoactive bowel sounds, no palpable masses  INTEGUMENT: good skin turgor, warm skin, appears well perfused  MUSCULOSKELETAL: no clubbing or cyanosis, no obvious deformity  NEUROLOGIC: awake, alert, oriented x3, good muscle tone in 4 extremities, no obvious sensory deficits  PSYCHIATRIC: mood is good, affect is congruent, linear and logical thought process  HEME/LYMPH: no palpable supraclavicular nodules, no obvious ecchymosis or petechiae

## 2022-02-18 NOTE — ED CDU PROVIDER SUBSEQUENT DAY NOTE - ATTENDING CONTRIBUTION TO CARE
57 yo female with TIA symptoms. I personally saw the patient with the PA, and completed the key components of the history and physical exam. I then discussed the management plan with the PA.

## 2022-02-18 NOTE — ED CDU PROVIDER SUBSEQUENT DAY NOTE - MEDICAL DECISION MAKING DETAILS
numbness to L LE AND UE, seen by neurology,   MRI negative recommended admission by neurology will admit the pt

## 2022-02-18 NOTE — PROGRESS NOTE ADULT - ATTENDING COMMENTS
I have personally seen, examined and participated in the care of this patient. I have reviewed all pertinent clinical information, including history, physical exam, plan and agree with the above.   presenting symptoms resolved. tte pending

## 2022-02-18 NOTE — DISCHARGE NOTE NURSING/CASE MANAGEMENT/SOCIAL WORK - PATIENT PORTAL LINK FT
You can access the FollowMyHealth Patient Portal offered by [facility] by registering at the following website: [email or web address]. By joining [facility]’s FollowMyHealth portal, you will also be able to view your health information using other applications (apps) compatible with our system.

## 2022-02-18 NOTE — H&P ADULT - NSICDXFAMILYHX_GEN_ALL_CORE_FT
FAMILY HISTORY:  Father  Still living? Unknown  Family history of hypertension, Age at diagnosis: Age Unknown  FH: pancreatic cancer, Age at diagnosis: Age Unknown

## 2022-02-18 NOTE — DISCHARGE NOTE NURSING/CASE MANAGEMENT/SOCIAL WORK - NSDCPEFALRISK_GEN_ALL_CORE
For information on Fall & Injury Prevention, visit: [email or web address] OR  [email or web address] OR  https://www.cdc.gov/steadi/patient.html

## 2022-02-18 NOTE — ED CDU PROVIDER SUBSEQUENT DAY NOTE - HISTORY
pt has no residual symptoms , MRI W.o any acute finding .medicine admission request by neurology   pt is seen by medicine team will admit the pt

## 2022-02-18 NOTE — ED ADULT NURSE REASSESSMENT NOTE - NS ED NURSE REASSESS COMMENT FT1
Patient resting comfortably. No distress noted. Patient awaiting MRI of the head.
medicine consult at bedside
awaiting medicine consult. in NAD. tele in use. NSR in 70's 99% RA. will ctm
care assumed from JEANA Werner. patient AAO x 4. NIH 0. on obs pending MRI for r/o TIA. recent CA dx, recently started on eliquis. POC updated. neurologically intact. all questions answered. call bell within reach. bed in lowest position. will ctm
patient aware she will be admitted to neuro and q2h neuros to be performed

## 2022-02-18 NOTE — H&P ADULT - ASSESSMENT
55yo F with PMHx HLD, recent diagnosis of Ovarian/liver lesions that is scheduled for biopsy next week, on Eliquis for clot  in her live on imaging presented to ED c/o  left leg numbness onset 2hrs prior to coming to ER.     Left sided numbness   -likely TIA, CTs negative, MRI negative   -no acute neurological deficit on exam   -admit to telemetry   -neurochecks Q4hrs, cont tele monitor   -TTE with bubble study order as per neurology   -cont with asa, statins   -cont with home eliquis   -check lipid panel and A1c   -Neurology following         57yo F with PMHx HLD, recent diagnosis of Ovarian/liver lesions that is scheduled for biopsy next week, on Eliquis for clot  in her live on imaging presented to ED c/o  left leg numbness onset 2hrs prior to coming to ER.     Left sided numbness   -likely TIA, CTs negative, MRI negative   -no acute neurological deficit on exam   -admit to telemetry   -neurochecks Q4hrs, cont tele monitor   -TTE with bubble study order as per neurology   -cont with asa, statins   -cont with home eliquis   -check lipid panel and A1c   -Neurology following     ?Liver clot   -cont home Eliquis      HLD  -cont with statin     DVT   -on Eliquis

## 2022-02-19 NOTE — PROGRESS NOTE ADULT - SUBJECTIVE AND OBJECTIVE BOX
Neurology     CLARENCE FELICIANO 56y Female     Patient is a 56y old  Female who presents with a chief complaint of     HPI:  55yo F with PMHx HLD, recent diagnosis of Ovarian/liver lesions that is scheduled for biopsy next week, on Eliquis for clot  in her live on imaging presented to ED c/o  left leg numbness onset 2hrs prior to coming to ER. Pt states that she sat down at her desk to work when suddenly felt numbness to her entire left leg following by tinglings that moved up to her left UE and left sided face. She denies slurred speech, focal weakness or prior personal hx of stroke. Pt reports that symptoms resolved 1hrs after she arrived in the ER. Pt called her oncology immediately who told her to come to ER. Denies cp, sob, palpitations, syncope, fever, or chills. Code stroke was activated in the ER, CT imaging negative.       PMH: Dyslipidemia         PSH: H/O partial thyroidectomy    H/O: hysterectomy          FAMILY HISTORY:  Family history of hypertension (Father)        SOCIAL HISTORY:  No history of tobacco or alcohol use     Allergies    No Known Allergies    Intolerances        Height (cm): 160 (02-17 @ 09:00)  Weight (kg): 90.2 (02-17 @ 09:08)  BMI (kg/m2): 35.2 (02-17 @ 09:08)          Vital Signs Last 24 Hrs  T(C): 36.7 (19 Feb 2022 10:13), Max: 36.7 (19 Feb 2022 04:57)  T(F): 98 (19 Feb 2022 10:13), Max: 98 (19 Feb 2022 04:57)  HR: 78 (19 Feb 2022 10:13) (72 - 80)  BP: 133/83 (19 Feb 2022 10:13) (114/75 - 133/83)  BP(mean): --  RR: 18 (19 Feb 2022 10:13) (18 - 18)  SpO2: 98% (19 Feb 2022 10:13) (98% - 98%)    MEDICATIONS    apixaban 5 milliGRAM(s) Oral two times a day  aspirin enteric coated 81 milliGRAM(s) Oral daily  atorvastatin 40 milliGRAM(s) Oral at bedtime       LABS:      02-18    137  |  100  |  6.2<L>  ----------------------------<  90  4.1   |  26.0  |  0.51    Ca    9.6      18 Feb 2022 08:06        Hemoglobin A1C:            STROKE CORE LABS:  Hemoglobin A1C:   Lipid Panel 02-18 @ 08:06  Total Cholesterol, Serum 161  LDL --  Triglycerides 113  Lipid Panel 02-18 @ 03:27  Total Cholesterol, Serum 167  LDL --  Triglycerides 108      PT/INR - ( 17 Feb 2022 09:23 )   PT: 16.9 sec;   INR: 1.48 ratio         PTT - ( 17 Feb 2022 09:23 )  PTT:41.2 sec          On Neurological Examination:    Mental Status - Patient is  awake, alert and oriented X3.  Speech is fluent. Patient can name, repeat and follow commands correctly  There is no dysarthria.    Cranial Nerves - PERRL, EOMI,  Visual fields are full to finger counting, no gross facial asymmetry, tongue/uvula midline    Motor Exam -   Right upper ---5/5 No drift  Left upper ---5/5 No drift  Right lower ---5/5 No drift  Left lower  ---5/5 No drift     nml bulk/tone    Sensory    Intact to light touch and pinprick bilaterally but subjectively reports left sided numbness.    Coord: FTN intact bilaterally     Gait -  Not assessed.     Reflexes:                         RADIOLOGY ( All neurological imaging studies were independently reviewed and interpreted by me)  [facility]    CT Brain Stroke Protocol (02.17.22 @ 09:25) >  IMPRESSION: No evidence of an acute transcortical infarction or   hemorrhage. Final discussed with Dr. Oliva at 9:20 AM        CTA  CTP     CT Angio Neck w/ IV Cont (02.17.22 @ 09:30) >  Negative CTP. No intracranial large vessel occlusion.      MRI:         MR Head No Cont (02.17.22 @ 21:46) >  IMPRESSION:  No acute intracranial hemorrhage or infarct.          TTE  TTE Echo Complete w/o Contrast w/ Doppler (02.18.22 @ 15:27) >    Summary:   1. Left ventricular ejection fraction, by visual estimation, is 60 to   65%.   2. Normal global left ventricular systolic function.   3. Normal left atrial size.   4. Normal right atrial size.   5. Mild mitral annular calcification.   6. Mild mitral valve regurgitation.   7. Thickening of the anterior and posterior mitral valve leaflets.   8. Mild tricuspid regurgitation.   9. Sclerotic aortic valve with normal opening.  10. Color flow doppler and intravenous injection of agitated saline   demonstrates the presence of an intact intra atrial septum.    MD Cydney Electronically signed on 2/19/2022 at 12:57:19 PM                      
CC: TIA r/p CVA (18 Feb 2022 01:55)    HPI:  55yo F with PMHx HLD, recent diagnosis of Ovarian/liver lesions that is scheduled for biopsy next week, on Eliquis for clot  in her liver on imaging presented to ED c/o left leg numbness onset 2hrs prior to coming to ER. Pt states that she sat down at her desk to work when suddenly felt numbness to her entire left leg following by tinglings that moved up to her left UE and left sided face. Pt reports that symptoms resolved 1hr after she arrived in the ER.    INTERVAL HPI/OVERNIGHT EVENTS: Patient seen and examined sitting up in bed.  Patient had no further numbness.  Patient denies any headache, dizziness, SOB, CP, abdominal pain, nausea, vomiting, dysuria.  Other ROS reviewed and are negative.    Vital Signs Last 24 Hrs  T(C): 36.6 (18 Feb 2022 09:59), Max: 37 (17 Feb 2022 19:29)  T(F): 97.9 (18 Feb 2022 09:59), Max: 98.6 (17 Feb 2022 19:29)  HR: 82 (18 Feb 2022 14:47) (73 - 87)  BP: 138/85 (18 Feb 2022 14:47) (119/81 - 148/56)  BP(mean): --  RR: 19 (18 Feb 2022 09:59) (17 - 19)  SpO2: 99% (18 Feb 2022 09:59) (97% - 99%)  I&O's Detail    PHYSICAL EXAM:  GENERAL: NAD  HEAD:  Atraumatic, Normocephalic  NECK: Supple, No JVD, Normal thyroid  NERVOUS SYSTEM:  Alert & Oriented X3, Good concentration; Motor Strength 5/5 B/L upper and lower extremities  CHEST/LUNG: Clear to auscultation bilaterally  HEART: Regular rate and rhythm; No murmurs, rubs, or gallops  ABDOMEN: Soft, Nontender, Nondistended; Bowel sounds present  EXTREMITIES:  2+ Peripheral Pulses, No clubbing, cyanosis, or edema        CARDIAC MARKERS ( 17 Feb 2022 09:23 )  x     / <0.01 ng/mL / x     / x     / x                                13.1   8.54  )-----------( 313      ( 17 Feb 2022 09:23 )             41.6     18 Feb 2022 08:06    137    |  100    |  6.2    ----------------------------<  90     4.1     |  26.0   |  0.51     Ca    9.6        18 Feb 2022 08:06    TPro  7.9    /  Alb  4.4    /  TBili  0.7    /  DBili  x      /  AST  30     /  ALT  14     /  AlkPhos  147    17 Feb 2022 09:23    PT/INR - ( 17 Feb 2022 09:23 )   PT: 16.9 sec;   INR: 1.48 ratio         PTT - ( 17 Feb 2022 09:23 )  PTT:41.2 sec    LIVER FUNCTIONS - ( 17 Feb 2022 09:23 )  Alb: 4.4 g/dL / Pro: 7.9 g/dL / ALK PHOS: 147 U/L / ALT: 14 U/L / AST: 30 U/L / GGT: x               MEDICATIONS  (STANDING):  apixaban 5 milliGRAM(s) Oral two times a day  aspirin enteric coated 81 milliGRAM(s) Oral daily  atorvastatin 40 milliGRAM(s) Oral at bedtime    MEDICATIONS  (PRN):      RADIOLOGY & ADDITIONAL TESTS:  < from: MR Head No Cont (02.17.22 @ 21:46) >  ACC: [id number] EXAM:  MR BRAIN                          PROCEDURE DATE:  02/17/2022          INTERPRETATION:  CLINICAL HISTORY: Follow-up stroke code. Numbness.    TECHNIQUE:  MRI of brain without contrast dated 2/17/2022.  Examination consisted of transaxial T1, T2, FLAIR, gradient echo as well   as diffusion-weighted sequences with corresponding ADC maps. Coronal T2   and sagittal T1-weighted images were also acquired through the brain.    COMPARISON: CT head and CTA head and neck with perfusion imaging 2/17/2022    FINDINGS:  There are no areas of abnormal restricted diffusion within the brain   parenchyma to suggest acute/subacute infarct. There is no acute   intracranial hemorrhage, vasogenic edema or significant parenchymal   signal abnormality. No areas of abnormal susceptibility artifact are seen   within the brain parenchyma or leptomeningeal space to suggest presence   of prior hemorrhagic products or abnormal calcifications. Cerebellar   tonsillar ectopia again noted with descent of the tonsils into the   foramen magnum of about 3 mm.    The ventricles, sulci and cisternal spaces are stable and unremarkable in   size and configuration. No midline shift or abnormal extra-axial fluid   collection.    Flow-voids of the major intracranial vessels are maintained, as seen best   on the T2-weighted images, indicating their patency.    The visualized paranasal sinuses and mastoid air cells are clear. Orbital   regions are unremarkable.    IMPRESSION:  No acute intracranial hemorrhage or infarct.    --- End of Report ---            GAVINO ANGELES MD; Attending Radiologist  This document has been electronically signed. Feb 17 2022 11:10PM    < end of copied text >  
Neurology     CLARENCE FELICIANO 56y Female     Patient is a 56y old  Female who presents with a chief complaint of     HPI:  57yo F with PMHx HLD, recent diagnosis of Ovarian/liver lesions that is scheduled for biopsy next week, on Eliquis for clot  in her live on imaging presented to ED c/o  left leg numbness onset 2hrs prior to coming to ER. Pt states that she sat down at her desk to work when suddenly felt numbness to her entire left leg following by tinglings that moved up to her left UE and left sided face. She denies slurred speech, focal weakness or prior personal hx of stroke. Pt reports that symptoms resolved 1hrs after she arrived in the ER. Pt called her oncology immediately who told her to come to ER. Denies cp, sob, palpitations, syncope, fever, or chills. Code stroke was activated in the ER, CT imaging negative.       PMH: Dyslipidemia         PSH: H/O partial thyroidectomy    H/O: hysterectomy          FAMILY HISTORY:  Family history of hypertension (Father)        SOCIAL HISTORY:  No history of tobacco or alcohol use     Allergies    No Known Allergies    Intolerances        Height (cm): 160 (02-17 @ 09:00)  Weight (kg): 90.2 (02-17 @ 09:08)  BMI (kg/m2): 35.2 (02-17 @ 09:08)        Vital Signs Last 24 Hrs  T(C): 36.6 (18 Feb 2022 21:21), Max: 36.9 (17 Feb 2022 23:35)  T(F): 97.9 (18 Feb 2022 21:21), Max: 98.5 (17 Feb 2022 23:35)  HR: 80 (18 Feb 2022 21:21) (77 - 87)  BP: 132/80 (18 Feb 2022 21:21) (119/81 - 148/56)  BP(mean): --  RR: 18 (18 Feb 2022 21:21) (17 - 19)  SpO2: 98% (18 Feb 2022 21:21) (97% - 99%)    MEDICATIONS    apixaban 5 milliGRAM(s) Oral two times a day  aspirin enteric coated 81 milliGRAM(s) Oral daily  atorvastatin 40 milliGRAM(s) Oral at bedtime         LABS:  CBC Full  -  ( 17 Feb 2022 09:23 )  WBC Count : 8.54 K/uL  RBC Count : 5.22 M/uL  Hemoglobin : 13.1 g/dL  Hematocrit : 41.6 %  Platelet Count - Automated : 313 K/uL  Mean Cell Volume : 79.7 fl  Mean Cell Hemoglobin : 25.1 pg  Mean Cell Hemoglobin Concentration : 31.5 gm/dL  Auto Neutrophil # : 5.26 K/uL  Auto Lymphocyte # : 2.04 K/uL  Auto Monocyte # : 0.79 K/uL  Auto Eosinophil # : 0.35 K/uL  Auto Basophil # : 0.06 K/uL  Auto Neutrophil % : 61.5 %  Auto Lymphocyte % : 23.9 %  Auto Monocyte % : 9.3 %  Auto Eosinophil % : 4.1 %  Auto Basophil % : 0.7 %      02-18    137  |  100  |  6.2<L>  ----------------------------<  90  4.1   |  26.0  |  0.51    Ca    9.6      18 Feb 2022 08:06    TPro  7.9  /  Alb  4.4  /  TBili  0.7  /  DBili  x   /  AST  30  /  ALT  14  /  AlkPhos  147<H>  02-17    LIVER FUNCTIONS - ( 17 Feb 2022 09:23 )  Alb: 4.4 g/dL / Pro: 7.9 g/dL / ALK PHOS: 147 U/L / ALT: 14 U/L / AST: 30 U/L / GGT: x           Hemoglobin A1C:   Lipid Panel 02-18 @ 08:06  Total Cholesterol, Serum 161  LDL --  Triglycerides 113  Lipid Panel 02-18 @ 03:27  Total Cholesterol, Serum 167  LDL --  Triglycerides 108      PT/INR - ( 17 Feb 2022 09:23 )   PT: 16.9 sec;   INR: 1.48 ratio         PTT - ( 17 Feb 2022 09:23 )  PTT:41.2 sec          On Neurological Examination:    Mental Status - Patient is  awake, alert and oriented X3.  Speech is fluent. Patient can name, repeat and follow commands correctly  There is no dysarthria.    Cranial Nerves - PERRL, EOMI,  Visual fields are full to finger counting, no gross facial asymmetry, tongue/uvula midline    Motor Exam -   Right upper ---5/5 No drift  Left upper ---5/5 No drift  Right lower ---5/5 No drift  Left lower  ---5/5 No drift     nml bulk/tone    Sensory    Intact to light touch and pinprick bilaterally but subjectively reports left sided numbness.    Coord: FTN intact bilaterally     Gait -  Not assessed.     Reflexes:                         RADIOLOGY ( All neurological imaging studies were independently reviewed and interpreted by me)  [facility]    CT Brain Stroke Protocol (02.17.22 @ 09:25) >  IMPRESSION: No evidence of an acute transcortical infarction or   hemorrhage. Final discussed with Dr. Oliva at 9:20 AM        CTA  CTP     CT Angio Neck w/ IV Cont (02.17.22 @ 09:30) >  Negative CTP. No intracranial large vessel occlusion.      MRI:     MR Head No Cont (02.17.22 @ 21:46) >  IMPRESSION:  No acute intracranial hemorrhage or infarct.          TTE

## 2022-02-19 NOTE — DISCHARGE NOTE PROVIDER - NSDCCPCAREPLAN_GEN_ALL_CORE_FT
PRINCIPAL DISCHARGE DIAGNOSIS  Diagnosis: Transient ischemic attack  Assessment and Plan of Treatment: Please take your medications as prescribed   Please follow up with Neurology      SECONDARY DISCHARGE DIAGNOSES  Diagnosis: HLD (hyperlipidemia)  Assessment and Plan of Treatment:

## 2022-02-19 NOTE — DISCHARGE NOTE PROVIDER - NSDCFUSCHEDAPPT_GEN_ALL_CORE_FT
CLARENCE FELICIANO ; 02/25/2022 ; [facility]  CLARENCE FELICIANO ; 02/28/2022 ; [facility]  CLARENCE FELICIANO ; 03/16/2022 ; [facility]

## 2022-02-19 NOTE — PROGRESS NOTE ADULT - ASSESSMENT
IMPRESSION:   R hemispheric TIA  ASSESSMENT/ PLAN:     - Neuro checks and vital signs Q 4 hours.  - SBP goal normotensive.  - On ASA 81 mg PO QD and Eliquis 5 BID  - Lipitor for LDL goal of < 70 .  - Telemetry monitoring.  - CT/CTA/CTP-- report as above- images were reviewed.  - MRI Brain - no acute infarct.  -  PT OT   -  SCD/ Eliquis for DVT prophylaxis.

## 2022-02-19 NOTE — DISCHARGE NOTE PROVIDER - NSDCMRMEDTOKEN_GEN_ALL_CORE_FT
aspirin 81 mg oral delayed release tablet: 1 tab(s) orally once a day  atorvastatin 40 mg oral tablet: 1 tab(s) orally once a day (at bedtime)  Eliquis 5 mg oral tablet: 1 tab(s) orally 2 times a day

## 2022-02-19 NOTE — DISCHARGE NOTE PROVIDER - HOSPITAL COURSE
57yo F with PMHx HLD, recent diagnosis of Ovarian/liver lesions that is scheduled for biopsy next week, on Eliquis for clot detected in her liver on imaging, presented to ED c/o left leg numbness onset 2hrs prior to coming to ER. Code stroke was activated in the ER, CT imaging negative. Pt reported resolution of symptoms in the ER. Neuro was consulted and pt had imaging done as below. Pt also had TTE which did not show acute pathology. Evaluated by PT and pt is cleared fo DC home today.     CT Brain Stroke Protocol IMPRESSION: No evidence of an acute transcortical infarction or hemorrhage. Final discussed with Dr. Oliva at 9:20 AM  CT Angio Neck w/ IV Cont, Negative CTP. No intracranial large vessel occlusion.  MR Head No Cont IMPRESSION: No acute intracranial hemorrhage or infarct.    DC diagnosis:  Left sided numbness / TIA    Liver clot   HLD    Vital Signs Last 24 Hrs  T(C): 36.7 (19 Feb 2022 10:13), Max: 36.7 (19 Feb 2022 04:57)  T(F): 98 (19 Feb 2022 10:13), Max: 98 (19 Feb 2022 04:57)  HR: 78 (19 Feb 2022 10:13) (72 - 82)  BP: 133/83 (19 Feb 2022 10:13) (114/75 - 138/85)  BP(mean): --  RR: 18 (19 Feb 2022 10:13) (18 - 18)  SpO2: 98% (19 Feb 2022 10:13) (98% - 98%)    Physical Exam:  CONSTITUTIONAL: Awake, alert and in no apparent distress  ENMT: Airway patent, Nasal mucosa clear. Mouth with normal mucosa.   EYES: Clear bilaterally, pupils equal, round and reactive to light. EOMI.  CARDIAC: Normal rate, regular rhythm. Heart sounds S1, S2. No murmurs, rubs or gallops   RESPIRATORY: Breath sounds clear and equal bilaterally. No wheezes, rhales or rhonchi   ABDOMINAL: Nontender to palpation. No rebound/ guarding   MUSCULOSKELETAL: Spine appears normal, range of motion is not limited, no muscle or joint tenderness   EXTREMITIES: No edema, cyanosis or deformity   NEUROLOGICAL: Alert and oriented, no focal deficits, no motor or sensory deficits   SKIN: No rash, skin turgor     DC time: 40 mins

## 2022-02-22 PROBLEM — R16.0 LIVER MASS: Status: ACTIVE | Noted: 2022-02-07

## 2022-02-22 PROBLEM — Z01.818 PRE-OP EVALUATION: Status: ACTIVE | Noted: 2022-01-01

## 2022-02-22 PROBLEM — Z00.8 ENCOUNTER FOR WORK CAPABILITY ASSESSMENT: Status: ACTIVE | Noted: 2022-01-01

## 2022-02-22 NOTE — H&P PST ADULT - PROBLEM SELECTOR PLAN 1
Labs and EKG performed.  Written and verbal instructions provided.  Now scheduled for US guided liver biopsy with anesthesia in radiology on 2/25/22 pending medical clearance.   Patient to have medical clearance with Dr. Delvalle  Patient educated on surgical scrub, COVID testing 2/22, preadmission instructions, medical clearance and day of procedure medications, verbalizes understanding, teach back method utilized.  Do not stop Aspirin or Eliquis without speaking with prescribing doctor first.

## 2022-02-22 NOTE — HISTORY OF PRESENT ILLNESS
[(Patient denies any chest pain, claudication, dyspnea on exertion, orthopnea, palpitations or syncope)] : Patient denies any chest pain, claudication, dyspnea on exertion, orthopnea, palpitations or syncope [No Pertinent Cardiac History] : no history of aortic stenosis, atrial fibrillation, coronary artery disease, recent myocardial infarction, or implantable device/pacemaker [No Pertinent Pulmonary History] : no history of asthma, COPD, sleep apnea, or smoking [No Adverse Anesthesia Reaction] : no adverse anesthesia reaction in self or family member [Chronic Anticoagulation] : chronic anticoagulation [Chronic Kidney Disease] : no chronic kidney disease [Diabetes] : no diabetes [FreeTextEntry1] : US guided Liver Bx [FreeTextEntry2] : 2/25/2022 [FreeTextEntry3] : Dr Dominguez [FreeTextEntry4] : Ms. CLARENCE FELICIANO is a 56 year old female presenting for pre op evaluation.\par Scheduled for US guided Bx.\par

## 2022-02-22 NOTE — ASSESSMENT
[Patient Optimized for Surgery] : Patient optimized for surgery [No Further Testing Recommended] : no further testing recommended [Modify anticoagulant treatment prior to procedure] : Modify anticoagulant treatment prior to procedure [Continue medications as is] : Continue current medications [As per surgery] : as per surgery [FreeTextEntry4] : Ms. CLARENCE FELICIANO is a 56 year old female presenting for pre op evaluation.\par Scheduled for US guided Bx.\par FMLA forms filled out for patient. [FreeTextEntry5] : Hold Eliquis 48 hrs prior to procedure.. restart after procedure as per surgeon.

## 2022-02-22 NOTE — H&P PST ADULT - HISTORY OF PRESENT ILLNESS
56 year old female  with history of  hyperlipemia, hypercholesterolemia, obesity and thyroid cancer who was just diagnosed with metastatic disease. Patient states seen by her PCP with abdominal pain. An abdominal US 2/6/22 showed multiple new hepatic lesions consistent with metastatic disease with evidence of periportal adenopathy without definitive pancreatic lesions seen. Follow up CT abdomen and pelvis AP 2/8/22 showed Diffuse carcinomatosis with bulky omental implants predominantly in the right side of the abdomen. Prominence of the right adnexa measuring 3.5 x 3.7cm. This may represent a primary ovarian carcinoma although this is not as large is typically seen with this degree of disease and other primary neoplasm may also be considered. Due to the large liver masses, suggestion of thrombus in the right portal vein and periportal lymphadenopathy, cholangiocarcinoma is also considered. Interval development of a few small pulmonary nodules may represent early metastatic disease.  Pt is pain free currently. Eating well. BMs are normal. Denies unintentional weight loss. No family hx of malignancy. Pt is up to date on mammogram, pap smear. Never had a colonoscopy.      Denies HA. CP, SOB, abd pain, constipation, diarrhea, melena, hematuria, dysuria.   56 year old female  with history of  hyperlipemia, hypercholesterolemia, obesity and thyroid cancer who was just diagnosed with metastatic disease. Patient states seen by her PCP with abdominal pain. An abdominal US 2/6/22 showed multiple new hepatic lesions consistent with metastatic disease with evidence of periportal adenopathy without definitive pancreatic lesions seen. Follow up CT abdomen and pelvis AP 2/8/22 showed Diffuse carcinomatosis with bulky omental implants predominantly in the right side of the abdomen. Prominence of the right adnexa measuring 3.5 x 3.7cm. This may represent a primary ovarian carcinoma although this is not as large is typically seen with this degree of disease and other primary neoplasm may also be considered. Due to the large liver masses, suggestion of thrombus in the right portal vein and periportal lymphadenopathy, cholangiocarcinoma is also considered. Interval development of a few small pulmonary nodules may represent early metastatic disease.  Pt is pain free currently. Eating well. BMs are normal. Denies unintentional weight loss. Reports family history of pancreatic cancer (father). Pt is up to date on mammogram, pap smear. Never had a colonoscopy.  Now scheduled for US guided liver biopsy with anesthesia in radiology on 2/25/22 pending medical clearance.     Denies HA. CP, SOB, abd pain, constipation, diarrhea, melena, hematuria, dysuria.   56 year old female  with history of  hyperlipemia, hypercholesterolemia, obesity and thyroid cancer who was just diagnosed with metastatic disease. Patient states seen by her PCP with abdominal pain. An abdominal US 2/6/22 showed multiple new hepatic lesions consistent with metastatic disease with evidence of periportal adenopathy without definitive pancreatic lesions seen. Follow up CT abdomen and pelvis AP 2/8/22 showed Diffuse carcinomatosis with bulky omental implants predominantly in the right side of the abdomen. Prominence of the right adnexa measuring 3.5 x 3.7cm. This may represent a primary ovarian carcinoma although this is not as large is typically seen with this degree of disease and other primary neoplasm may also be considered. Due to the large liver masses, suggestion of thrombus in the right portal vein and periportal lymphadenopathy, cholangiocarcinoma is also considered. Interval development of a few small pulmonary nodules may represent early metastatic disease. Patient reports abdominal pain of 4/10 mostly located on the right side of abdomen but radiates to the left lower abdomen.  Describes pain as a constant ache.   Reports some loss of appetite , but is still eating ok. Reports bowel movements are normal.  Denies unintentional weight loss, nausea, vomiting, constipation or diarrhea. Reports family history of pancreatic cancer (father).  Patient was seen in [facility] ER on 2/17/22 for left sided numbness. States she was worked up for stroke, all testing was negative. States she was diagnosed with possible TIA. States numbness lasted about 2 hours, currently no s/s of numbness. Now scheduled for US guided liver biopsy with anesthesia in radiology on 2/25/22 pending medical clearance.     Denies HA. CP, SOB, abd pain, constipation, diarrhea, melena, hematuria, dysuria.   56 year old female  with history of  hyperlipemia, hypercholesterolemia, obesity and thyroid cancer who was just diagnosed with metastatic disease. Patient states seen by her PCP with abdominal pain. An abdominal US 2/6/22 showed multiple new hepatic lesions consistent with metastatic disease with evidence of periportal adenopathy without definitive pancreatic lesions seen. Follow up CT abdomen and pelvis AP 2/8/22 showed Diffuse carcinomatosis with bulky omental implants predominantly in the right side of the abdomen. Prominence of the right adnexa measuring 3.5 x 3.7cm. This may represent a primary ovarian carcinoma although this is not as large is typically seen with this degree of disease and other primary neoplasm may also be considered. Due to the large liver masses, suggestion of thrombus in the right portal vein and periportal lymphadenopathy, cholangiocarcinoma is also considered. Interval development of a few small pulmonary nodules may represent early metastatic disease. Patient reports abdominal pain of 4/10 mostly located on the right side of abdomen but radiates to the left lower abdomen.  Describes pain as a constant ache.   Reports some loss of appetite , but is still eating ok. Reports bowel movements are normal.  Denies unintentional weight loss, nausea, vomiting, constipation, diarrhea or melena. Reports family history of pancreatic cancer (father).  Patient was seen in [facility] ER on 2/17/22 for left sided numbness. States she was worked up for stroke, all testing was negative. States she was diagnosed with possible TIA. States numbness lasted about 2 hours, currently no s/s of numbness Now scheduled for US guided liver biopsy with anesthesia in radiology on 2/25/22 pending medical clearance.

## 2022-02-22 NOTE — H&P PST ADULT - NSANTHOSAYNRD_GEN_A_CORE
No. CAL screening performed.  STOP BANG Legend: 0-2 = LOW Risk; 3-4 = INTERMEDIATE Risk; 5-8 = HIGH Risk

## 2022-02-22 NOTE — H&P PST ADULT - PROBLEM SELECTOR PLAN 4
PT/PTT/INR ordered.  On Eliquis  Do not stop Aspirin or Eliquis without speaking with prescribing doctor first.  As per patient medication prescribed by Dr. Delvalle will speak to him regarding ASA and Eliquis

## 2022-02-22 NOTE — PHYSICAL EXAM
[Normal Sclera/Conjunctiva] : normal sclera/conjunctiva [No Edema] : there was no peripheral edema [No Extremity Clubbing/Cyanosis] : no extremity clubbing/cyanosis [Soft] : abdomen soft [Normal Bowel Sounds] : normal bowel sounds [No Joint Swelling] : no joint swelling [Grossly Normal Strength/Tone] : grossly normal strength/tone [No Rash] : no rash [Coordination Grossly Intact] : coordination grossly intact [Normal Gait] : normal gait [Normal] : affect was normal and insight and judgment were intact [de-identified] : tenderness RUQ and b/l lower quadrant.

## 2022-02-22 NOTE — H&P PST ADULT - PROBLEM SELECTOR PLAN 2
CAP score 6 patient Moderate Risk,  SCDs ordered for day of procedure.  Surgical team to assess need for VTE prophylaxis

## 2022-02-22 NOTE — H&P PST ADULT - NEGATIVE ENMT SYMPTOMS
no hearing difficulty/no ear pain/no tinnitus/no sinus symptoms/no nasal congestion/no nasal discharge/no nose bleeds/no dry mouth/no throat pain/no dysphagia

## 2022-02-22 NOTE — H&P PST ADULT - ASSESSMENT
This is a pleasant obese 56 year old female in NAD   with history of  hyperlipemia, hypercholesterolemia, obesity and thyroid cancer who was just diagnosed with metastatic disease. Patient states seen by her PCP with abdominal pain. An abdominal US 22 showed multiple new hepatic lesions consistent with metastatic disease with evidence of periportal adenopathy without definitive pancreatic lesions seen. Follow up CT abdomen and pelvis AP 22 showed Diffuse carcinomatosis with bulky omental implants predominantly in the right side of the abdomen. Prominence of the right adnexa measuring 3.5 x 3.7cm. This may represent a primary ovarian carcinoma although this is not as large is typically seen with this degree of disease and other primary neoplasm may also be considered. Due to the large liver masses, suggestion of thrombus in the right portal vein and periportal lymphadenopathy, cholangiocarcinoma is also considered. Interval development of a few small pulmonary nodules may represent early metastatic disease. Patient reports abdominal pain of 4/10 mostly located on the right side of abdomen but radiates to the left lower abdomen.  Describes pain as a constant ache. Reports some loss of appetite , but is still eating ok. Reports bowel movements are normal.  Denies unintentional weight loss, nausea, vomiting, constipation, diarrhea or melena. Reports family history of pancreatic cancer (father).  Patient was seen in [facility] ER on 22 for left sided numbness. States she was worked up for stroke, all testing was negative. States she was diagnosed with possible TIA. States numbness lasted about 2 hours, currently no s/s of numbness. Now scheduled for US guided liver biopsy with anesthesia in radiology on 22 pending medical clearance.       CAPRINI SCORE    AGE RELATED RISK FACTORS                                                             [X ] Age 41-60 years                                            (1 Point)  [ ] Age: 61-74 years                                           (2 Points)                 [ ] Age= 75 years                                                (3 Points)             DISEASE RELATED RISK FACTORS                                                       [ ] Edema in the lower extremities                 (1 Point)                     [ ] Varicose veins                                               (1 Point)                                 [X ] BMI > 25 Kg/m2                                            (1 Point)                                  [ ] Serious infection (ie PNA)                            (1 Point)                     [ ] Lung disease ( COPD, Emphysema)            (1 Point)                                                                          [ ] Acute myocardial infarction                         (1 Point)                  [ ] Congestive heart failure (in the previous month)  (1 Point)         [ ] Inflammatory bowel disease                            (1 Point)                  [ ] Central venous access, PICC or Port               (2 points)       (within the last month)                                                                [ ] Stroke (in the previous month)                        (5 Points)    [X ] Previous or present malignancy                       (2 points)                                                                                                                                                         HEMATOLOGY RELATED FACTORS                                                         [ ] Prior episodes of VTE                                     (3 Points)                     [ ] Positive family history for VTE                      (3 Points)                  [ ] Prothrombin 43753 A                                     (3 Points)                     [ ] Factor V Leiden                                                (3 Points)                        [ ] Lupus anticoagulants                                      (3 Points)                                                           [ ] Anticardiolipin antibodies                              (3 Points)                                                       [ ] High homocysteine in the blood                   (3 Points)                                             [ ] Other congenital or acquired thrombophilia      (3 Points)                                                [ ] Heparin induced thrombocytopenia                  (3 Points)                                        MOBILITY RELATED FACTORS  [ ] Bed rest                                                         (1 Point)  [ ] Plaster cast                                                    (2 points)  [ ] Bed bound for more than 72 hours           (2 Points)    GENDER SPECIFIC FACTORS  [ ] Pregnancy or had a baby within the last month   (1 Point)  [ ] Post-partum < 6 weeks                                   (1 Point)  [ ] Hormonal therapy  or oral contraception   (1 Point)  [ ] History of pregnancy complications              (1 point)  [ ] Unexplained or recurrent              (1 Point)    OTHER RISK FACTORS                                           (1 Point)  [ ] BMI >40, smoking, diabetes requiring insulin, chemotherapy  blood transfusions and length of surgery over 2 hours    SURGERY RELATED RISK FACTORS  [ ]  Section within the last month     (1 Point)  [ ] Minor surgery                                                  (1 Point)  [ ] Arthroscopic surgery                                       (2 Points)  [X ] Planned major surgery lasting more            (2 Points)      than 45 minutes     [ ] Elective hip or knee joint replacement       (5 points)       surgery                                                TRAUMA RELATED RISK FACTORS  [ ] Fracture of the hip, pelvis, or leg                       (5 Points)  [ ] Spinal cord injury resulting in paralysis             (5 points)       (in the previous month)    [ ] Paralysis  (less than 1 month)                             (5 Points)  [ ] Multiple Trauma within 1 month                        (5 Points)    Total Score [ 6       ]    Caprini Score 0-2: Low Risk, NO VTE prophylaxis required for most patients, encourage ambulation  Caprini Score 3-6: Moderate Risk , pharmacologic VTE prophylaxis is indicated for most patients (in the absence of contraindications)  Caprini Score Greater than or =7: High risk, pharmocologic VTE prophylaxis indicated for most patients (in the absence of contraindications)      OPIOID RISK TOOL    CHOCO EACH BOX THAT APPLIES AND ADD TOTALS AT THE END    FAMILY HISTORY OF SUBSTANCE ABUSE                 FEMALE         MALE                                                Alcohol                             [  ]1 pt          [  ]3pts                                               Illegal Durgs                     [  ]2 pts        [  ]3pts                                               Rx Drugs                           [  ]4 pts        [  ]4 pts    PERSONAL HISTORY OF SUBSTANCE ABUSE                                                                                          Alcohol                             [  ]3 pts       [  ]3 pts                                               Illegal Drugs                     [  ]4 pts        [  ]4 pts                                               Rx Drugs                           [  ]5 pts        [  ]5 pts    AGE BETWEEN 16-45 YEARS                                      [  ]1 pt         [  ]1 pt    HISTORY OF PREADOLESCENT   SEXUAL ABUSE                                                             [  ]3 pts        [  ]0pts    PSYCHOLOGICAL DISEASE                     ADD, OCD, Bipolar, Schizophrenia        [  ]2 pts         [  ]2 pts                      Depression                                               [  ]1 pt           [  ]1 pt           SCORING TOTAL   (add numbers and type here)              (**0*)                                     A score of 3 or lower indicated LOW risk for future opioid abuse  A score of 4 to 7 indicated moderate risk for future opioid abuse  A score of 8 or higher indicates a high risk for opioid abuse

## 2022-02-22 NOTE — ASU PATIENT PROFILE, ADULT - FALL HARM RISK - UNIVERSAL INTERVENTIONS
Bed in lowest position, wheels locked, appropriate side rails in place/Call bell, personal items and telephone in reach/Instruct patient to call for assistance before getting out of bed or chair/Non-slip footwear when patient is out of bed/[place] to call system/Physically safe environment - no spills, clutter or unnecessary equipment/Purposeful Proactive Rounding/Room/bathroom lighting operational, light cord in reach

## 2022-02-25 NOTE — ASU DISCHARGE PLAN (ADULT/PEDIATRIC) - NS MD DC FALL RISK RISK
For information on Fall & Injury Prevention, visit: [email or web address] OR  [email or web address] OR  https://www.cdc.gov/steadi/patient.html

## 2022-02-25 NOTE — CHART NOTE - NSCHARTNOTEFT_GEN_A_CORE
Vascular & Interventional Radiology Pre-Procedure Note    Procedure Name: CT guided liver biopsy    HPI: 56y Female with liver masses    56 year old female  with history of  hyperlipemia, hypercholesterolemia, obesity and thyroid cancer who was just diagnosed with metastatic disease. Patient states seen by her PCP with abdominal pain. An abdominal US 2/6/22 showed multiple new hepatic lesions consistent with metastatic disease with evidence of periportal adenopathy without definitive pancreatic lesions seen. Follow up CT abdomen and pelvis AP 2/8/22 showed Diffuse carcinomatosis with bulky omental implants predominantly in the right side of the abdomen. Prominence of the right adnexa measuring 3.5 x 3.7cm. This may represent a primary ovarian carcinoma although this is not as large is typically seen with this degree of disease and other primary neoplasm may also be considered. Due to the large liver masses, suggestion of thrombus in the right portal vein and periportal lymphadenopathy, cholangiocarcinoma is also considered. Interval development of a few small pulmonary nodules may represent early metastatic disease. Patient reports abdominal pain of 4/10 mostly located on the right side of abdomen but radiates to the left lower abdomen.  Describes pain as a constant ache.   Reports some loss of appetite , but is still eating ok. Reports bowel movements are normal.  Denies unintentional weight loss, nausea, vomiting, constipation, diarrhea or melena. Reports family history of pancreatic cancer (father).  Patient was seen in [facility] ER on 2/17/22 for left sided numbness. States she was worked up for stroke, all testing was negative. States she was diagnosed with possible TIA. States numbness lasted about 2 hours, currently no s/s of numbness Now scheduled for US guided liver biopsy with anesthesia in radiology on 2/25/22    PMH/PSH  PAST MEDICAL HISTORY:  Dyslipidemia     HLD (hyperlipidemia)     Obesity     Thyroid cancer.     Possible TIA    PAST SURGICAL HISTORY:  H/O partial thyroidectomy 1997    H/O: hysterectomy.           Allergies: No Known Allergies      Medications (Abx/Cardiac/Anticoagulation/Blood Products)  · 	atorvastatin 40 mg oral tablet: Last Dose Taken:  , 1 tab(s) orally once a day (at bedtime)  · 	aspirin 81 mg oral tablet: Last Dose Taken:  , 1 tab(s) orally once a day  · 	Eliquis 5 mg oral tablet: Last Dose Taken: 2/22/22 , 1 tab(s) orally 2 times a day      Data:  160  89.4    T(C): 36.6  HR: 78  BP: 162/85  RR: 16  SpO2: 99%      -WBC 7.66 / HgB 12.4 / Hct 38.8 / Plt 343  -Na 141 / Cl 103 / BUN 5.5 / Glucose 95  -K 4.3 / CO2 25.0 / Cr 0.46  -ALT 17 / Alk Phos 143 / T.Bili 0.4  -INR1.60      Imaging: ___Multiple liver masses with right abdominal peritoneal masses and lung nodules____    Plan:   -56y Female presents for __CT guided liver biopsy_____  -Risks/Benefits/alternatives explained with the patient and witnessed informed consent obtained. Vascular & Interventional Radiology Pre-Procedure Note    Procedure Name: CT guided liver biopsy    HPI: 56y Female with liver masses    56 year old female  with history of  hyperlipemia, hypercholesterolemia, obesity and thyroid cancer who was just diagnosed with metastatic disease. Patient states seen by her PCP with abdominal pain. An abdominal US 2/6/22 showed multiple new hepatic lesions consistent with metastatic disease with evidence of periportal adenopathy without definitive pancreatic lesions seen. Follow up CT abdomen and pelvis AP 2/8/22 showed Diffuse carcinomatosis with bulky omental implants predominantly in the right side of the abdomen. Prominence of the right adnexa measuring 3.5 x 3.7cm. This may represent a primary ovarian carcinoma although this is not as large is typically seen with this degree of disease and other primary neoplasm may also be considered. Due to the large liver masses, suggestion of thrombus in the right portal vein and periportal lymphadenopathy, cholangiocarcinoma is also considered. Interval development of a few small pulmonary nodules may represent early metastatic disease. Patient reports abdominal pain of 4/10 mostly located on the right side of abdomen but radiates to the left lower abdomen.  Describes pain as a constant ache.   Reports some loss of appetite , but is still eating ok. Reports bowel movements are normal.  Denies unintentional weight loss, nausea, vomiting, constipation, diarrhea or melena. Reports family history of pancreatic cancer (father).  Patient was seen in [facility] ER on 2/17/22 for left sided numbness. States she was worked up for stroke, all testing was negative. States she was diagnosed with possible TIA. States numbness lasted about 2 hours, currently no s/s of numbness Now scheduled for US guided liver biopsy with anesthesia in radiology on 2/25/22    PMH/PSH  PAST MEDICAL HISTORY:  Dyslipidemia     HLD (hyperlipidemia)     Obesity     Thyroid cancer.     Possible TIA    PAST SURGICAL HISTORY:  H/O partial thyroidectomy 1997    H/O: hysterectomy.           Allergies: No Known Allergies      Medications (Abx/Cardiac/Anticoagulation/Blood Products)  · 	atorvastatin 40 mg oral tablet: Last Dose Taken:  , 1 tab(s) orally once a day (at bedtime)  · 	aspirin 81 mg oral tablet: Last Dose Taken:  , 1 tab(s) orally once a day  · 	Eliquis 5 mg oral tablet: Last Dose Taken: 2/22/22 , 1 tab(s) orally 2 times a day      Data:  160  89.4    T(C): 36.6  HR: 78  BP: 162/85  RR: 16  SpO2: 99%      -WBC 7.66 / HgB 12.4 / Hct 38.8 / Plt 343  -Na 141 / Cl 103 / BUN 5.5 / Glucose 95  -K 4.3 / CO2 25.0 / Cr 0.46  -ALT 17 / Alk Phos 143 / T.Bili 0.4  -INR 1.11      Imaging: ___Multiple liver masses with right abdominal peritoneal masses and lung nodules____    Plan:   -56y Female presents for __CT guided liver biopsy_____  -Risks/Benefits/alternatives explained with the patient and witnessed informed consent obtained.

## 2022-03-08 PROBLEM — C73 MALIGNANT NEOPLASM OF THYROID GLAND: Chronic | Status: ACTIVE | Noted: 2022-01-01

## 2022-03-08 PROBLEM — E66.9 OBESITY, UNSPECIFIED: Chronic | Status: ACTIVE | Noted: 2022-01-01

## 2022-03-08 PROBLEM — E78.5 HYPERLIPIDEMIA, UNSPECIFIED: Chronic | Status: ACTIVE | Noted: 2022-01-01

## 2022-03-08 NOTE — ASSESSMENT
[FreeTextEntry1] : 57 yo F with h/o hyperlipemia, hypercholesterolemia, obesity and thyroid cancer diagnosed with metastatic cholangiocarcinoma\par \par # Cholangiocarcinoma\par -Pathology showed poorly differentiated adenocarcinoma morphologically compatible with a pancreaticobiliary tract/upper GI tract origine. Imaging consistent with biliary cancer. \par -CT CAP 2/8/22 showed diffuse carcinomatosis with bulky omental implants predominantly in the right side of the abdomen. Prominence of the right adnexa measuring 3.5 x 3.7 cm.\par -discussed with patient and  that treatment of metastatic cancer will be systemic chemotherapy with gemcitabine and cisplatin. \par -TOPAZ 1 trial data presented at [facility] 2022 showed that the addition of durvalumab to [place]/cis improved PFS and OS in think patient population compared to gem/cis + placebo.  OS with a hazard ratio (HR) of 0.80 (95% CI, 0.66-0.97; P = .021). PFS was also improved for the durvalumab group (HR 0.75; 95% CI, 0.64-0.89; P = .001).\par -1500 mg of durvalumab every 3 weeks with 1000 mg/m2 of gemcitabine and and 25 mg/2 of cisplatin on days 1 and 8 and then every 3 weeks up to 8 cycles, followed by durvalumab 1500 mg every 4\par -risks and benefits were discussed. Consent obtained. \par -will start treatment tentatively later this week\par \par \par # supportive care\par -nutritionist consult\par -N/V -  zofran, compazine\par -constipation - colace, senna\par \par \par fu in 2 weeks

## 2022-03-08 NOTE — HISTORY OF PRESENT ILLNESS
[de-identified] : 55 yo F with h/o hyperlipemia, hypercholesterolemia, obesity and thyroid cancer who was f/w metastatic disease of unknown primary in February.\par \par She saw her PCP, Dr. Delvalle, c/o abdominal painon 2/8/22.  An abdominal U/S 2/6/22 showed multiple new hepatic lesions consistent with metastatic disease with evidence of periportal adenopathy without definitive pancreatic lesions seen.\par \par Follow up CT CAP 2/8/22 showed diffuse carcinomatosis with bulky omental implants predominantly in the right side of the abdomen. Prominence of the right adnexa measuring 3.5 x 3.7 cm. This may represent a primary ovarian carcinoma although this is not as large is typically seen with this degree of disease and other primary neoplasm may also be considered. Due to the large liver masses, suggestion of thrombus in the right portal vein and periportal lymphadenopathy, cholangiocarcinoma is also considered.\par Interval development of a few small pulmonary nodules may represent early metastatic disease.\par \par Pt is pain free currently. Eating well. BMs are normal. Denies unintentional weight loss. No family hx of malignancy. Pt is up to date on mammogram, pap smear. Never had a colonoscopy.\par \par \par Denies HA. CP, SOB, abd pain, constipation, diarrhea, melena, hematuria, dysuria.  [de-identified] : 3/8/22: Jessi is here with her  to finalized treatment plan. Pathology showed poorly differentiated adenocarcinoma morphologically compatible with a pancreaticobiliary tract/upper GI tract origine. Imaging consistent with biliary cancer.

## 2022-03-26 NOTE — REASON FOR VISIT
[Initial Consultation] : an initial consultation for [Spouse] : spouse [FreeTextEntry2] : metastatic disease of unknown primary

## 2022-03-26 NOTE — PHYSICAL EXAM
[Normal] : supple, no neck mass and thyroid not enlarged [Normal] : oriented to person, place and time, with appropriate affect [de-identified] : soft, ND, NT;  obese ; healed lower transverse abdominal incision

## 2022-03-26 NOTE — ASSESSMENT
[FreeTextEntry1] : Ms. CLARENCE FELICIANO  is a 56 year  old female  presenting for an initial consultation for evaluation of metastatic disease of unknown primary.  Pt. had c/o abdominal pain and sought care with her PCP,  Dr. Delvalle.  Patient was referred for an abdominal ultrasound on 2/6/2022 which showed multiple new hepatic lesions consistent with metastatic disease with evidence of periportal adenopathy without definitive pancreatic lesions seen.\par \par Follow up CT C/A/P performed on 2/8/2022 showed diffuse carcinomatosis with bulky omental implants predominantly in the right side of the abdomen. Prominence of the right adnexa measuring 3.5 x 3.7 cm. This may represent a primary ovarian carcinoma although this is not as large is typically seen with this degree of disease and other primary neoplasm may also be considered. Due to the large liver masses, suggestion of thrombus in the right portal vein and periportal lymphadenopathy, cholangiocarcinoma is also considered. Interval development of a few small pulmonary nodules may represent early metastatic disease. \par \par PLAN:\par 1) US guided liver biopsy \par 2) CEA, , CA-125 \par 3) RTO 10-14 days post biopsy to discuss results \par 4) Med onc

## 2022-03-26 NOTE — CONSULT LETTER
[Dear  ___] : Dear  [unfilled], [Consult Letter:] : I had the pleasure of evaluating your patient, [unfilled]. [Please see my note below.] : Please see my note below. [Consult Closing:] : Thank you very much for allowing me to participate in the care of this patient.  If you have any questions, please do not hesitate to contact me. [Sincerely,] : Sincerely, [FreeTextEntry3] : Red Dominguez MD, MPH, FACS, FSSO\par , [facility]\par [facility]\par Associate Professor of Surgery\par [employer] at [facility]

## 2022-03-26 NOTE — HISTORY OF PRESENT ILLNESS
[de-identified] : Ms. CLARENCE FELICIANO  is a 56 year  old female  presenting for an initial consultation for evaluation of metastatic disease of unknown primary, referred by Dr. Nando Delvalle. \par \par Pt. states she developed right abdominal pain approximately 8 weeks ago which was initially intermittent however it became more consistent. She states she was taking colon cleansers for several years which made her bowels very regular. She was not sure if that was contributing to her pain and she recently stopped it.  She states the pain did not improve.  She sought care with her PCP,  Dr. Delvalle.  Patient was referred for an abdominal ultrasound on 2022 which showed multiple new hepatic lesions consistent with metastatic disease with evidence of periportal adenopathy without definitive pancreatic lesions seen.\par \par Follow up CT C/A/P performed on 2022 showed diffuse carcinomatosis with bulky omental implants predominantly in the right side of the abdomen. Prominence of the right adnexa measuring 3.5 x 3.7 cm. This may represent a primary ovarian carcinoma although this is not as large is typically seen with this degree of disease and other primary neoplasm may also be considered. Due to the large liver masses, suggestion of thrombus in the right portal vein and periportal lymphadenopathy, cholangiocarcinoma is also considered. Interval development of a few small pulmonary nodules may represent early metastatic disease. \par \par Pt. is with c/o 4/10 right to mid lower abdominal pain, worse at night and worse with lying on her left side.  Denies nausea or vomiting. Daily BM's without BRBPR.  Passing flatus. States she has an appetite loss over the past 1-2 weeks however no weight loss.  Occasional lower back pain.  Never had a colonoscopy. \par \par Pt. is scheduled to see Dr. Stuart today. \par \par PMH/PSH: Hyperlipemia, hypercholesterolemia, obesity, thyroid cancer s/p left thyroidectomy in 1998, uterine fibroids s/p partial hysterectomy in . GERD s/p endoscopy several years ago, treated for H. Pylori.   (1st pregnancy at age 20). 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 \par Family Hx:  Father with liver cancer in his 80's. \par Allergies: NKDA\par Medications: ASA 81 mg daily, Simvastatin 40 mg daily

## 2022-03-26 NOTE — REVIEW OF SYSTEMS
[Negative] : Heme/Lymph [FreeTextEntry2] : see HPI [FreeTextEntry8] : see HPI [de-identified] : see HPI

## 2022-03-30 NOTE — ASSESSMENT
[FreeTextEntry1] : 55 yo F with h/o hyperlipemia, hypercholesterolemia, obesity and thyroid cancer diagnosed with metastatic cholangiocarcinoma\par \par # Cholangiocarcinoma\par -Pathology showed poorly differentiated adenocarcinoma morphologically compatible with a pancreaticobiliary tract/upper GI tract origine. Imaging consistent with biliary cancer. \par -CT CAP 2/8/22 showed diffuse carcinomatosis with bulky omental implants predominantly in the right side of the abdomen. Prominence of the right adnexa measuring 3.5 x 3.7 cm.\par -discussed with patient and  that treatment of metastatic cancer will be systemic chemotherapy with gemcitabine and cisplatin. \par -TOPAZ 1 trial data presented at [facility] 2022 showed that the addition of durvalumab to [place]/cis improved PFS and OS in think patient population compared to gem/cis + placebo.  OS with a hazard ratio (HR) of 0.80 (95% CI, 0.66-0.97; P = .021). PFS was also improved for the durvalumab group (HR 0.75; 95% CI, 0.64-0.89; P = .001).\par -1500 mg of durvalumab every 3 weeks with 1000 mg/m2 of gemcitabine and and 25 mg/2 of cisplatin on days 1 and 8 and then every 3 weeks up to 8 cycles, followed by durvalumab 1500 mg every 4\par -risks and benefits were discussed. Consent obtained. \par -Started C#1 of [place]/cis + durv on 3/16\par \par \par # supportive care\par -nutritionist following\par -N/V -  zofran, compazine\par -constipation - colace, senna\par \par \par fu in 4 weeks

## 2022-03-30 NOTE — HISTORY OF PRESENT ILLNESS
[Disease: _____________________] : Disease: [unfilled] [AJCC Stage: ____] : AJCC Stage: [unfilled] [de-identified] : Metastatic cholangiocarcinoma\par C#1 of [place]/cis + durv on 3/16\par \par \par \par 57 yo F with h/o hyperlipemia, hypercholesterolemia, obesity and thyroid cancer who was f/w metastatic disease of unknown primary in February.\par \par She saw her PCP, Dr. Delvalle, c/o abdominal pain 2/8/22.  An abdominal U/S 2/6/22 showed multiple new hepatic lesions consistent with metastatic disease with evidence of periportal adenopathy without definitive pancreatic lesions seen.\par \par Follow up CT CAP 2/8/22 showed diffuse carcinomatosis with bulky omental implants predominantly in the right side of the abdomen. Prominence of the right adnexa measuring 3.5 x 3.7 cm. This may represent a primary ovarian carcinoma although this is not as large is typically seen with this degree of disease and other primary neoplasm may also be considered. Due to the large liver masses, suggestion of thrombus in the right portal vein and periportal lymphadenopathy, cholangiocarcinoma is also considered.\par Interval development of a few small pulmonary nodules may represent early metastatic disease.\par \par Pt is pain free currently. Eating well. BMs are normal. Denies unintentional weight loss. No family hx of malignancy. Pt is up to date on mammogram, pap smear. Never had a colonoscopy.\par \par \par Denies HA. CP, SOB, abd pain, constipation, diarrhea, melena, hematuria, dysuria.  [de-identified] : 3/8/22: Jessi is here with her  to finalized treatment plan. Pathology showed poorly differentiated adenocarcinoma morphologically compatible with a pancreaticobiliary tract/upper GI tract origine. Imaging consistent with biliary cancer. \par \par 3/30/22: Jessi is here for follow up. She received C#1 of [place]/cis + durv on 3/16. Had initial N/v. resolved with antiemetics. Has mild fatigue, lost some weight. RUQ and abd pain resolved. Tolerating well otherwise.

## 2022-04-14 NOTE — DISCHARGE NOTE ADULT - DISCHARGE MEDICATION AND TREATMENT
Time: 1919  Arrived by: Private vehicle  Chief Complaint: Requesting alcohol detox  History provided by: Patient    History of Present Illness:  Patient is a 50 y.o. year old male that presents to the emergency department with long-term history of heavy alcohol use, now presenting requesting detox from alcohol and to go [facility] to [facility] across the street for this.    His last drink was earlier this morning, and he is already starting to feel signs of withdrawal and feels shaky and anxious.    He does not use any recreational drugs other than occasional marijuana, but does smoke cigarettes.    He states he drinks about a 12 pack of beer and a pint of vodka daily for many years.    He denies any withdrawal seizures.    He is in his otherwise normal state of health and denies any fevers or chills or any new cough or congestion or vomiting or diarrhea or dysuria or signs of flu or COVID-19 virus.        Similar Symptoms Previously: Yes  Recently seen: No      Patient Care Team  Primary Care Provider: Unknown    Past Medical History:   Alcohol dependence    Social History     Socioeconomic History   • Marital status:    Tobacco Use   • Smoking status: Current Every Day Smoker     Packs/day: 1.00   • Smokeless tobacco: Never Used   Substance and Sexual Activity   • Alcohol use: Yes     Alcohol/week: 22.0 standard drinks     Types: 12 Cans of beer, 10 Shots of liquor per week     Comment: 1 pint of vodka a day   • Drug use: Never         No Known Allergies  Past Medical History:   Diagnosis Date   • Alcohol abuse      Past Surgical History:   Procedure Laterality Date   • CARPAL TUNNEL RELEASE     • CHEST SURGERY      in [place], hit by IED     History reviewed. No pertinent family history.    Home Medications:  Prior to Admission medications    Medication Sig Start Date End Date Taking? Authorizing Provider   IBUPROFEN PO Take  by mouth.    Provider, Historical, MD        Record Review:  I have  "reviewed the patient's records in [facility].     Review of Systems:  Review of Systems   I performed a 10 point review of systems which was all negative, except for the positives found in the HPI above.    Physical Exam:  /81 (BP Location: Left arm, Patient Position: Lying)   Pulse 116   Temp 97.8 °F (36.6 °C) (Oral)   Resp 20   Ht 182.9 cm (72\")   Wt 66 kg (145 lb 8.1 oz)   SpO2 94%   BMI 19.73 kg/m²     Physical Exam   General: Awake alert and in no obvious distress, appears mildly anxious and starting to withdrawal    HEENT: Head normocephalic atraumatic, eyes PERRLA EOMI, nose normal, oropharynx normal.    Neck: Supple full range of motion, no meningismus, no lymphadenopathy    Heart: Tachycardic with a regular rhythm, no murmurs or rubs, 2+ radial pulses bilaterally    Lungs: Mild scattered rhonchi and wheezes likely baseline from tobacco abuse, no crackles, no respiratory distress    Abdomen: Soft, nontender, nondistended, no rebound or guarding    Skin: Warm, dry, no rash    Musculoskeletal: Normal range of motion, no lower extremity edema    Neurologic: Oriented x3, no motor deficits no sensory deficits    Psychiatric: Mood appears slightly anxious otherwise cooperative and stable, no psychosis        Medications in the Emergency Department:  Medications   LORazepam (ATIVAN) injection 1 mg (1 mg Intramuscular Given 4/14/22 1949)        Labs  Lab Results (last 24 hours)     Procedure Component Value Units Date/Time    CBC & Differential [[id number]]  (Abnormal) Collected: 04/14/22 1833    Specimen: Blood Updated: 04/14/22 1926    Narrative:      The following orders were created for panel order CBC & Differential.  Procedure                               Abnormality         Status                     ---------                               -----------         ------                     CBC Auto Differential[[id number]]        Abnormal            Final result                 Please view results for these " tests on the individual orders.    Comprehensive Metabolic Panel [[id number]]  (Abnormal) Collected: 04/14/22 1833    Specimen: Blood Updated: 04/14/22 1951     Glucose 193 mg/dL      BUN 15 mg/dL      Creatinine 0.87 mg/dL      Sodium 139 mmol/L      Potassium 3.8 mmol/L      Chloride 99 mmol/L      CO2 22.4 mmol/L      Calcium 9.4 mg/dL      Total Protein 8.2 g/dL      Albumin 4.90 g/dL      ALT (SGPT) 129 U/L      AST (SGOT) 212 U/L      Alkaline Phosphatase 83 U/L      Total Bilirubin 0.3 mg/dL      Globulin 3.3 gm/dL      A/G Ratio 1.5 g/dL      BUN/Creatinine Ratio 17.2     Anion Gap 17.6 mmol/L      eGFR 105.8 mL/min/1.73      Comment: National Kidney Foundation and American Society of Nephrology (ASN) Task Force recommended calculation based on the Chronic Kidney Disease Epidemiology Collaboration (CKD-EPI) equation refit without adjustment for race.       Narrative:      GFR Normal >60  Chronic Kidney Disease <60  Kidney Failure <15      Acetaminophen Level [[id number]]  (Normal) Collected: 04/14/22 1833    Specimen: Blood Updated: 04/14/22 1951     Acetaminophen <5.0 mcg/mL     Ethanol [[id number]]  (Abnormal) Collected: 04/14/22 1833    Specimen: Blood Updated: 04/14/22 1951     Ethanol 366 mg/dL      Ethanol % 0.366 %     Narrative:      Ethanol (Plasma)  <10 Essentially Negative    Toxic Concentrations           mg/dL    Flushing, slowing of reflexes    Impaired visual activity         Depression of CNS              >100  Possible Coma                  >300       Salicylate Level [[id number]]  (Normal) Collected: 04/14/22 1833    Specimen: Blood Updated: 04/14/22 1951     Salicylate <0.3 mg/dL     CBC Auto Differential [[id number]]  (Abnormal) Collected: 04/14/22 1833    Specimen: Blood Updated: 04/14/22 1926     WBC 3.55 10*3/mm3      RBC 4.17 10*6/mm3      Hemoglobin 14.6 g/dL      Hematocrit 41.4 %      MCV 99.3 fL      MCH 35.0 pg      MCHC 35.3 g/dL      RDW 13.4 %      RDW-SD 49.3 fl       MPV 10.3 fL      Platelets 114 10*3/mm3      Neutrophil % 50.7 %      Lymphocyte % 35.8 %      Monocyte % 11.8 %      Eosinophil % 0.3 %      Basophil % 1.4 %      Immature Grans % 0.0 %      Neutrophils, Absolute 1.80 10*3/mm3      Lymphocytes, Absolute 1.27 10*3/mm3      Monocytes, Absolute 0.42 10*3/mm3      Eosinophils, Absolute 0.01 10*3/mm3      Basophils, Absolute 0.05 10*3/mm3      Immature Grans, Absolute 0.00 10*3/mm3      nRBC 0.0 /100 WBC     Urine Drug Screen - Urine, Clean Catch [[id number]]  (Normal) Collected: 04/14/22 1903    Specimen: Urine, Clean Catch Updated: 04/14/22 1928     Amphet/Methamphet, Screen Negative     Barbiturates Screen, Urine Negative     Benzodiazepine Screen, Urine Negative     Cocaine Screen, Urine Negative     Opiate Screen Negative     THC, Screen, Urine Negative     Methadone Screen, Urine Negative     Oxycodone Screen, Urine Negative    Narrative:      Negative Thresholds Per Drugs Screened:    Amphetamines                 500 ng/ml  Barbiturates                 200 ng/ml  Benzodiazepines              100 ng/ml  Cocaine                      300 ng/ml  Methadone                    300 ng/ml  Opiates                      300 ng/ml  Oxycodone                    100 ng/ml  THC                           50 ng/ml    The Normal Value for all drugs tested is negative. This report includes final unconfirmed screening results to be used for medical treatment purposes only. Unconfirmed results must not be used for non-medical purposes such as employment or legal testing. Clinical consideration should be applied to any drug of abuse test, particularly when unconfirmed results are used.                   Imaging:  No Radiology Exams Resulted Within Past 24 Hours     Procedures:  Procedures    Progress                            Medical Decision Making:  [facility]     This patient is a pleasant 50-year-old male with history of heavy alcohol use over the years now [facility] and was sent here first to sober up some.    We are checking screening lab work and alcohol level as well as drug screen here as part of a medical clearance work-up.    He is starting to withdraw a bit and I will give him a dose of intramuscular Ativan for withdrawal symptoms and also a nicotine patch.    We will [facility] to [facility] to arrange to send him there today.    Final diagnoses:   Alcohol dependence with uncomplicated withdrawal (HCC)        Disposition:  ED Disposition     ED Disposition   Discharge    Condition   Stable    Comment   [facility] to [facility] Jose Enrique Galaviz MD  04/15/22 0148     Statement Selected

## 2022-05-02 NOTE — ASSESSMENT
[FreeTextEntry1] : 55 yo F with h/o hyperlipemia, hypercholesterolemia, obesity and thyroid cancer diagnosed with metastatic cholangiocarcinoma\par \par # Cholangiocarcinoma\par -Pathology showed poorly differentiated adenocarcinoma morphologically compatible with a pancreaticobiliary tract/upper GI tract origine. Imaging consistent with biliary cancer. \par -CT CAP 2/8/22 showed diffuse carcinomatosis with bulky omental implants predominantly in the right side of the abdomen. Prominence of the right adnexa measuring 3.5 x 3.7 cm.\par -TOPAZ 1 trial data presented at [facility] 2022 showed that the addition of durvalumab to [place]/Cis improved PFS and OS in think patient population compared to gem/cis + placebo.  OS with a hazard ratio (HR) of 0.80 (95% CI, 0.66-0.97; P = .021). PFS was also improved for the durvalumab group (HR 0.75; 95% CI, 0.64-0.89; P = .001).\par -1500 mg of Durvalumab every 3 weeks with 1000 mg/m2 of gemcitabine and and 25 mg/2 of cisplatin on days 1 and 8 and then every 3 weeks up to 8 cycles, followed by durvalumab 1500 mg every 4\par -Started C1 of [place]/Cis + Durvalumab on 3/16\par -today is C3D1\par -next restaging scan in mid June, 3 months from start of treatment \par \par # supportive care\par -RD Kyra hernandez, will recheck weight at next visit\par -N/V - continue  zofran, compazine PRN\par -constipation - continue colace, senna

## 2022-05-02 NOTE — HISTORY OF PRESENT ILLNESS
[Disease: _____________________] : Disease: [unfilled] [AJCC Stage: ____] : AJCC Stage: [unfilled] [de-identified] : Metastatic cholangiocarcinoma\par C#1 of [place]/cis + durv on 3/16\par \par \par \par 57 yo F with h/o hyperlipemia, hypercholesterolemia, obesity and thyroid cancer who was f/w metastatic disease of unknown primary in February.\par \par She saw her PCP, Dr. Delvalle, c/o abdominal pain 2/8/22.  An abdominal U/S 2/6/22 showed multiple new hepatic lesions consistent with metastatic disease with evidence of periportal adenopathy without definitive pancreatic lesions seen.\par \par Follow up CT CAP 2/8/22 showed diffuse carcinomatosis with bulky omental implants predominantly in the right side of the abdomen. Prominence of the right adnexa measuring 3.5 x 3.7 cm. This may represent a primary ovarian carcinoma although this is not as large is typically seen with this degree of disease and other primary neoplasm may also be considered. Due to the large liver masses, suggestion of thrombus in the right portal vein and periportal lymphadenopathy, cholangiocarcinoma is also considered.\par Interval development of a few small pulmonary nodules may represent early metastatic disease.\par \par Pt is pain free currently. Eating well. BMs are normal. Denies unintentional weight loss. No family hx of malignancy. Pt is up to date on mammogram, pap smear. Never had a colonoscopy.\par \par \par Denies HA. CP, SOB, abd pain, constipation, diarrhea, melena, hematuria, dysuria.  [de-identified] : 3/8/22: Jessi is here with her  to finalized treatment plan. Pathology showed poorly differentiated adenocarcinoma morphologically compatible with a pancreaticobiliary tract/upper GI tract origine. Imaging consistent with biliary cancer. \par \par 3/30/22: Jessi is here for follow up. She received C#1 of [place]/cis + durv on 3/16. Had initial N/v. resolved with antiemetics. Has mild fatigue, lost some weight. RUQ and abd pain resolved. Tolerating well otherwise. \par \par 4/27/22:  Patient presents on C3D1 [place]/Cis/Durvalumab for metastatic cholangiocarcinoma\par + Fatigue. + Nausea/vomiting. + Constipation. + Decreased appetite.  No arthralgia/myalgia, hematuria, rash, peripheral edema, fever or SOB.

## 2022-05-25 PROBLEM — D64.9 ANEMIA: Status: ACTIVE | Noted: 2022-01-01

## 2022-05-25 PROBLEM — C79.9 METASTATIC CANCER: Status: ACTIVE | Noted: 2022-01-01

## 2022-05-25 NOTE — HISTORY OF PRESENT ILLNESS
[Disease: _____________________] : Disease: [unfilled] [AJCC Stage: ____] : AJCC Stage: [unfilled] [de-identified] : Metastatic cholangiocarcinoma\par C#1 of [place]/cis + durv on 3/16\par \par \par \par 55 yo F with h/o hyperlipemia, hypercholesterolemia, obesity and thyroid cancer who was f/w metastatic disease of unknown primary in February.\par \par She saw her PCP, Dr. Delvalle, c/o abdominal pain 2/8/22.  An abdominal U/S 2/6/22 showed multiple new hepatic lesions consistent with metastatic disease with evidence of periportal adenopathy without definitive pancreatic lesions seen.\par \par Follow up CT CAP 2/8/22 showed diffuse carcinomatosis with bulky omental implants predominantly in the right side of the abdomen. Prominence of the right adnexa measuring 3.5 x 3.7 cm. This may represent a primary ovarian carcinoma although this is not as large is typically seen with this degree of disease and other primary neoplasm may also be considered. Due to the large liver masses, suggestion of thrombus in the right portal vein and periportal lymphadenopathy, cholangiocarcinoma is also considered.\par Interval development of a few small pulmonary nodules may represent early metastatic disease.\par \par Pt is pain free currently. Eating well. BMs are normal. Denies unintentional weight loss. No family hx of malignancy. Pt is up to date on mammogram, pap smear. Never had a colonoscopy.\par \par \par Denies HA. CP, SOB, abd pain, constipation, diarrhea, melena, hematuria, dysuria.  [de-identified] : 3/8/22: Jessi is here with her  to finalized treatment plan. Pathology showed poorly differentiated adenocarcinoma morphologically compatible with a pancreaticobiliary tract/upper GI tract origine. Imaging consistent with biliary cancer. \par \par 3/30/22: Jessi is here for follow up. She received C#1 of [place]/cis + durv on 3/16. Had initial N/v. resolved with antiemetics. Has mild fatigue, lost some weight. RUQ and abd pain resolved. Tolerating well otherwise. \par \par 4/27/22: Patient presents on C3D1 [place]/Cis/Durvalumab for metastatic cholangiocarcinoma, + Fatigue. + Nausea/vomiting. + Constipation. + Decreased appetite. No arthralgia/myalgia, hematuria, rash, peripheral edema, fever or SOB. \par \par 5/25/22: C4D8 [place]/Cis/Durvalumab for metastatic cholangiocarcinoma, + Fatigue. + Nausea/vomiting the first day. + Constipation. + Decreased appetite. +pain RUQ 2 weeks ago. Better with oxycodone.\par \par Will scan mid June. after C5.

## 2022-05-25 NOTE — ASSESSMENT
[FreeTextEntry1] : 57 yo F with h/o hyperlipemia, hypercholesterolemia, obesity and thyroid cancer diagnosed with metastatic cholangiocarcinoma\par \par # Cholangiocarcinoma\par -Pathology showed poorly differentiated adenocarcinoma morphologically compatible with a pancreaticobiliary tract/upper GI tract origine. Imaging consistent with biliary cancer. \par -CT CAP 2/8/22 showed diffuse carcinomatosis with bulky omental implants predominantly in the right side of the abdomen. Prominence of the right adnexa measuring 3.5 x 3.7 cm.\par -discussed with patient and  that treatment of metastatic cancer will be systemic chemotherapy with gemcitabine and cisplatin. \par -TOPAZ 1 trial data presented at [facility] 2022 showed that the addition of durvalumab to [place]/cis improved PFS and OS in think patient population compared to gem/cis + placebo.  OS with a hazard ratio (HR) of 0.80 (95% CI, 0.66-0.97; P = .021). PFS was also improved for the durvalumab group (HR 0.75; 95% CI, 0.64-0.89; P = .001).\par -1500 mg of durvalumab every 3 weeks with 1000 mg/m2 of gemcitabine and and 25 mg/2 of cisplatin on days 1 and 8 and then every 3 weeks up to 8 cycles, followed by durvalumab 1500 mg every 4\par -risks and benefits were discussed. Consent obtained. \par -Started C#1 of [place]/cis + durv on 3/16\par \par \par # supportive care\par -nutritionist following\par -N/V -  zofran, compazine\par -constipation - colace, senna\par \par \par fu in 4 weeks\par \par -PBS reviewed: normal RBC with appropriate central pallor, no schistocytes.Granulocytes, lymphocytes appear normal morphologically, no increased in immature cells, adequate platelets, no clumping

## 2022-05-26 NOTE — ASSESSMENT
[FreeTextEntry1] : 57 year old female presenting for evaluation.\par \par #Cancer associated pain:\par -Previously tried tramadol but ineffective\par -Reports oxycodone makes her too sedated.  Patient instructed to decrease dose to 2.5 mg as needed for severe pain.\par -Start gabapentin 300 mg nightly for neuropathic pain, may increase to 3 times daily if tolerating.\par \par #Chemotherapy-induced nausea\par -Discussed risks and benefits of medical cannabis to improve nausea, pain and appetite.\par -Medical cannabis certification completed today. Provided cannabis education, overview of state program, and discussed adverse effects in detail. Counseled that vaporized cannabis is not the preferred route of administration due to the fact that both short-term and long-term risks associated with vaporizing oils are not yet fully understood. Recommend starting with 1:1 THC:CBD formulation at low dose of THC.\par I Stop # [id number] \par \par \par Follow up in 1 month. \par

## 2022-05-26 NOTE — HISTORY OF PRESENT ILLNESS
[FreeTextEntry1] : Ms. Miller is a 56 year old female with h/o hyperlipemia, hypercholesterolemia, obesity and thyroid cancer diagnosed with metastatic cholangiocarcinoma.  Started C#1 of [place]/cis + durv on 3/16.\par \par She reports that she has had persistent symptoms of nausea and decreased appetite.  She also reports she is having persistent pain in her right upper quadrant abdominal region.  She states it comes and goes.  Sometimes describes a sharp shooting pains.  Previously tried tramadol which is not effective.  She states when she takes oxycodone it helps with the pain but it makes her too tired.\par

## 2022-05-26 NOTE — DATA REVIEWED
[FreeTextEntry1] : -CT CAP 2/8/22 showed diffuse carcinomatosis with bulky omental implants predominantly in the right side of the abdomen. Prominence of the right adnexa measuring 3.5 x 3.7 cm.

## 2022-05-26 NOTE — PHYSICAL EXAM
[FreeTextEntry1] : Gen: Patient is A&O x 3, NAD\par HEENT: EOMI, hearing grossly normal\par Resp: regular, non - labored\par CV: pulses regular\par Skin: no rashes, erythema\par Lymph: no clubbing, cyanosis, edema, or palpable lymphadenopathy\par Inspection: no instability or misalignment\par ROM: full throughout\par Palpation:no tenderness to palpation\par Sensation: intact to light touch\par Reflexes: 1+ and symmetric throughout\par Strength: 5/5 throughout\par Special tests: -Oden's sign \par Gait: normal, non-antalgic\par \par

## 2022-06-24 NOTE — ASSESSMENT
[FreeTextEntry1] : 55 yo F with h/o hyperlipemia, hypercholesterolemia, obesity and thyroid cancer diagnosed with metastatic cholangiocarcinoma\par \par # Cholangiocarcinoma\par -Pathology showed poorly differentiated adenocarcinoma morphologically compatible with a pancreaticobiliary tract/upper GI tract origine. Imaging consistent with biliary cancer. \par -CT CAP 2/8/22 showed diffuse carcinomatosis with bulky omental implants predominantly in the right side of the abdomen. Prominence of the right adnexa measuring 3.5 x 3.7 cm.\par -discussed with patient and  that treatment of metastatic cancer will be systemic chemotherapy with gemcitabine and cisplatin. \par -TOPAZ 1 trial data presented at [facility] 2022 showed that the addition of durvalumab to [place]/cis improved PFS and OS in think patient population compared to gem/cis + placebo.  OS with a hazard ratio (HR) of 0.80 (95% CI, 0.66-0.97; P = .021). PFS was also improved for the durvalumab group (HR 0.75; 95% CI, 0.64-0.89; P = .001).\par -1500 mg of durvalumab every 3 weeks with 1000 mg/m2 of gemcitabine and and 25 mg/2 of cisplatin on days 1 and 8 and then every 3 weeks up to 8 cycles, followed by durvalumab 1500 mg every 4\par -risks and benefits were discussed. Consent obtained. \par -Started C#1 of [place]/cis + durv on 3/16\par \par # Tachycardia\par -with low grade fever 100.1\par -an episode of vomiting after eating spicy food\par -? food poisoning\par -advised pt to stay hydrated\par -call back if sx worsens ....may need to postpone next chemo\par \par # supportive care\par -nutritionist following\par -N/V -  zofran, compazine\par -constipation - colace, senna\par \par \par fu in 4 weeks

## 2022-06-24 NOTE — HISTORY OF PRESENT ILLNESS
[de-identified] : Metastatic cholangiocarcinoma\par C#1 of [place]/cis + durv on 3/16\par \par \par \par 55 yo F with h/o hyperlipemia, hypercholesterolemia, obesity and thyroid cancer who was f/w metastatic disease of unknown primary in February.\par \par She saw her PCP, Dr. Delvalle, c/o abdominal pain 2/8/22.  An abdominal U/S 2/6/22 showed multiple new hepatic lesions consistent with metastatic disease with evidence of periportal adenopathy without definitive pancreatic lesions seen.\par \par Follow up CT CAP 2/8/22 showed diffuse carcinomatosis with bulky omental implants predominantly in the right side of the abdomen. Prominence of the right adnexa measuring 3.5 x 3.7 cm. This may represent a primary ovarian carcinoma although this is not as large is typically seen with this degree of disease and other primary neoplasm may also be considered. Due to the large liver masses, suggestion of thrombus in the right portal vein and periportal lymphadenopathy, cholangiocarcinoma is also considered.\par Interval development of a few small pulmonary nodules may represent early metastatic disease.\par \par Pt is pain free currently. Eating well. BMs are normal. Denies unintentional weight loss. No family hx of malignancy. Pt is up to date on mammogram, pap smear. Never had a colonoscopy.\par \par \par Denies HA. CP, SOB, abd pain, constipation, diarrhea, melena, hematuria, dysuria.  [de-identified] : 3/8/22: Jessi is here with her  to finalized treatment plan. Pathology showed poorly differentiated adenocarcinoma morphologically compatible with a pancreaticobiliary tract/upper GI tract origine. Imaging consistent with biliary cancer. \par \par 3/30/22: Jessi is here for follow up. She received C#1 of [place]/cis + durv on 3/16. Had initial N/v. resolved with antiemetics. Has mild fatigue, lost some weight. RUQ and abd pain resolved. Tolerating well otherwise. \par \par 4/27/22: Patient presents on C3D1 [place]/Cis/Durvalumab for metastatic cholangiocarcinoma, + Fatigue. + Nausea/vomiting. + Constipation. + Decreased appetite. No arthralgia/myalgia, hematuria, rash, peripheral edema, fever or SOB. \par \par 5/25/22: C4D8 [place]/Cis/Durvalumab for metastatic cholangiocarcinoma, + Fatigue. + Nausea/vomiting the first day. + Constipation. + Decreased appetite. +pain RUQ 2 weeks ago. Better with oxycodone.\par \par Will scan mid June. after C5. \par \par 6/24/22: C5D15 [place]/Cis/Durvalumab for metastatic cholangiocarcinoma. Pt reports feeling well the last 2 weeks, appetite was good. Ab pain resolved. However, pain returned yesterday after she threw up. Had spicy [place] food. Pt noted to be tachycardic. Low grade temp of 100.1.  No numbness and tingling.\par \par CT on 6/17/22 showed Overall positive chemotherapeutic response with decrease in size of liver lesions and peritoneal implants in the right hemiabdomen. Slight increase in size of an implant in the alex hepatis.\par

## 2022-07-20 PROBLEM — K59.00 CONSTIPATION: Status: ACTIVE | Noted: 2022-01-01

## 2022-07-20 PROBLEM — R11.2 CHEMOTHERAPY INDUCED NAUSEA AND VOMITING: Status: ACTIVE | Noted: 2022-01-01

## 2022-07-20 NOTE — HISTORY OF PRESENT ILLNESS
[Disease: _____________________] : Disease: [unfilled] [AJCC Stage: ____] : AJCC Stage: [unfilled] [de-identified] : Metastatic cholangiocarcinoma\par C#1 of [place]/cis + durv on 3/16\par \par \par \par 55 yo F with h/o hyperlipemia, hypercholesterolemia, obesity and thyroid cancer who was f/w metastatic disease of unknown primary in February.\par \par She saw her PCP, Dr. Delvalle, c/o abdominal pain 2/8/22.  An abdominal U/S 2/6/22 showed multiple new hepatic lesions consistent with metastatic disease with evidence of periportal adenopathy without definitive pancreatic lesions seen.\par \par Follow up CT CAP 2/8/22 showed diffuse carcinomatosis with bulky omental implants predominantly in the right side of the abdomen. Prominence of the right adnexa measuring 3.5 x 3.7 cm. This may represent a primary ovarian carcinoma although this is not as large is typically seen with this degree of disease and other primary neoplasm may also be considered. Due to the large liver masses, suggestion of thrombus in the right portal vein and periportal lymphadenopathy, cholangiocarcinoma is also considered.\par Interval development of a few small pulmonary nodules may represent early metastatic disease.\par \par Pt is pain free currently. Eating well. BMs are normal. Denies unintentional weight loss. No family hx of malignancy. Pt is up to date on mammogram, pap smear. Never had a colonoscopy.\par \par \par Denies HA. CP, SOB, abd pain, constipation, diarrhea, melena, hematuria, dysuria.  [de-identified] : 3/8/22: Jessi is here with her  to finalized treatment plan. Pathology showed poorly differentiated adenocarcinoma morphologically compatible with a pancreaticobiliary tract/upper GI tract origine. Imaging consistent with biliary cancer. \par \par 3/30/22: Jessi is here for follow up. She received C#1 of [place]/cis + durv on 3/16. Had initial N/v. resolved with antiemetics. Has mild fatigue, lost some weight. RUQ and abd pain resolved. Tolerating well otherwise. \par \par 4/27/22: Patient presents on C3D1 [place]/Cis/Durvalumab for metastatic cholangiocarcinoma, + Fatigue. + Nausea/vomiting. + Constipation. + Decreased appetite. No arthralgia/myalgia, hematuria, rash, peripheral edema, fever or SOB. \par \par 5/25/22: C4D8 [place]/Cis/Durvalumab for metastatic cholangiocarcinoma, + Fatigue. + Nausea/vomiting the first day. + Constipation. + Decreased appetite. +pain RUQ 2 weeks ago. Better with oxycodone.\par \par Will scan mid June. after C5. \par \par 6/24/22: C5D15 [place]/Cis/Durvalumab for metastatic cholangiocarcinoma. Pt reports feeling well the last 2 weeks, appetite was good. Ab pain resolved. However, pain returned yesterday after she threw up. Had spicy [place]ala[place] food. Pt noted to be tachycardic. Low grade temp of 100.1.  No numbness and tingling.\par CT on 6/17/22 showed Overall positive chemotherapeutic response with decrease in size of liver lesions and peritoneal implants in the right hemiabdomen. Slight increase in size of an implant in the alex hepatis.\par \par 7/20/22: Patient presents on C7D1 [place]/Cis/Durvalumab for metastatic cholangiocarcinoma\par + Another episode of abdominal pain and N/V, describes as sudden onset N/V followed by abdominal pain. + Intermittent fatigue. + Constipation. + Decreased appetite with subsequent weight.  No arthralgia/myalgia, hematuria, rash, peripheral edema, fever or SOB. \par

## 2022-07-20 NOTE — PHYSICAL EXAM
[Restricted in physically strenuous activity but ambulatory and able to carry out work of a light or sedentary nature] : Status 1- Restricted in physically strenuous activity but ambulatory and able to carry out work of a light or sedentary nature, e.g., light house work, office work [Normal] : normal appearance, no rash, nodules, vesicles, ulcers, erythema [de-identified] : + tachycardia

## 2022-07-20 NOTE — ASSESSMENT
[FreeTextEntry1] : 55 yo F with h/o hyperlipemia, hypercholesterolemia, obesity and thyroid cancer diagnosed with metastatic cholangiocarcinoma\par \par # Cholangiocarcinoma\par -Pathology showed poorly differentiated adenocarcinoma morphologically compatible with a pancreaticobiliary tract/upper GI tract origine. Imaging consistent with biliary cancer. \par -CT CAP 2/8/22 showed diffuse carcinomatosis with bulky omental implants predominantly in the right side of the abdomen. Prominence of the right adnexa measuring 3.5 x 3.7 cm.\par -Started C1 of [place]/Cis + Durvalumab on 3/16\par -today is C7D1\par -TOPAZ 1 trial data presented at [facility] 2022 showed that the addition of Durvalumab to [place]/cis improved PFS and OS in think patient population compared to gem/cis + placebo.  OS with a hazard ratio (HR) of 0.80 (95% CI, 0.66-0.97; P = .021). PFS was also improved for the Durvalumab group (HR 0.75; 95% CI, 0.64-0.89; P = .001).\par -1500 mg of durvalumab every 3 weeks with 1000 mg/m2 of gemcitabine and and 25 mg/2 of cisplatin on days 1 and 8 every 21 days up to 8 cycles, followed by Durvalumab 1500 mg every 4 weeks\par -Dr. Stuart follow up after C8 to discuss ongoing Durvalumab\par \par # supportive care\par -nutritionist following, coupons and Boost samples given today \par -N/V -  zofran, compazine\par -abdominal pain - continue oxycodone PRN\par -constipation - Miralax

## 2022-08-18 NOTE — ASSESSMENT
[FreeTextEntry1] : 57 yo F with h/o hyperlipemia, hypercholesterolemia, obesity and thyroid cancer diagnosed with metastatic cholangiocarcinoma\par \par # Cholangiocarcinoma\par -Pathology showed poorly differentiated adenocarcinoma morphologically compatible with a pancreaticobiliary tract/upper GI tract origine. Imaging consistent with biliary cancer. \par -CT CAP 2/8/22 showed diffuse carcinomatosis with bulky omental implants predominantly in the right side of the abdomen. Prominence of the right adnexa measuring 3.5 x 3.7 cm.\par -Started C1 of [place]/Cis + Durvalumab on 3/16\par -today is C7D1\par -TOPAZ 1 trial data presented at [facility] 2022 showed that the addition of Durvalumab to [place]/cis improved PFS and OS in think patient population compared to gem/cis + placebo.  OS with a hazard ratio (HR) of 0.80 (95% CI, 0.66-0.97; P = .021). PFS was also improved for the Durvalumab group (HR 0.75; 95% CI, 0.64-0.89; P = .001).\par -1500 mg of durvalumab every 3 weeks with 1000 mg/m2 of gemcitabine and and 25 mg/2 of cisplatin on days 1 and 8 every 21 days up to 8 cycles, followed by Durvalumab 1500 mg every 4 weeks\par -completed C8 on 8/17/22\par -will continue with maintenance Durvalumab\par -Next CT c/ap in Sept\par \par # supportive care\par -nutritionist following, coupons and Boost samples given \par -N/V -  zofran, compazine\par -abdominal pain - continue oxycodone PRN\par -constipation - Miralax

## 2022-08-18 NOTE — PHYSICAL EXAM
[Restricted in physically strenuous activity but ambulatory and able to carry out work of a light or sedentary nature] : Status 1- Restricted in physically strenuous activity but ambulatory and able to carry out work of a light or sedentary nature, e.g., light house work, office work [Normal] : normal appearance, no rash, nodules, vesicles, ulcers, erythema [de-identified] : + tachycardia

## 2022-08-18 NOTE — HISTORY OF PRESENT ILLNESS
[Disease: _____________________] : Disease: [unfilled] [AJCC Stage: ____] : AJCC Stage: [unfilled] [de-identified] : Metastatic cholangiocarcinoma\par C#1 of [place]/cis + durv on 3/16\par \par \par \par 57 yo F with h/o hyperlipemia, hypercholesterolemia, obesity and thyroid cancer who was f/w metastatic disease of unknown primary in February.\par \par She saw her PCP, Dr. Delvalle, c/o abdominal pain 2/8/22.  An abdominal U/S 2/6/22 showed multiple new hepatic lesions consistent with metastatic disease with evidence of periportal adenopathy without definitive pancreatic lesions seen.\par \par Follow up CT CAP 2/8/22 showed diffuse carcinomatosis with bulky omental implants predominantly in the right side of the abdomen. Prominence of the right adnexa measuring 3.5 x 3.7 cm. This may represent a primary ovarian carcinoma although this is not as large is typically seen with this degree of disease and other primary neoplasm may also be considered. Due to the large liver masses, suggestion of thrombus in the right portal vein and periportal lymphadenopathy, cholangiocarcinoma is also considered.\par Interval development of a few small pulmonary nodules may represent early metastatic disease.\par \par Pt is pain free currently. Eating well. BMs are normal. Denies unintentional weight loss. No family hx of malignancy. Pt is up to date on mammogram, pap smear. Never had a colonoscopy.\par \par \par Denies HA. CP, SOB, abd pain, constipation, diarrhea, melena, hematuria, dysuria.  [de-identified] : 3/8/22: Jessi is here with her  to finalized treatment plan. Pathology showed poorly differentiated adenocarcinoma morphologically compatible with a pancreaticobiliary tract/upper GI tract origine. Imaging consistent with biliary cancer. \par \par 3/30/22: Jessi is here for follow up. She received C#1 of [place]/cis + durv on 3/16. Had initial N/v. resolved with antiemetics. Has mild fatigue, lost some weight. RUQ and abd pain resolved. Tolerating well otherwise. \par \par 4/27/22: Patient presents on C3D1 [place]/Cis/Durvalumab for metastatic cholangiocarcinoma, + Fatigue. + Nausea/vomiting. + Constipation. + Decreased appetite. No arthralgia/myalgia, hematuria, rash, peripheral edema, fever or SOB. \par \par 5/25/22: C4D8 [place]/Cis/Durvalumab for metastatic cholangiocarcinoma, + Fatigue. + Nausea/vomiting the first day. + Constipation. + Decreased appetite. +pain RUQ 2 weeks ago. Better with oxycodone.\par \par Will scan mid June. after C5. \par \par 6/24/22: C5D15 [place]/Cis/Durvalumab for metastatic cholangiocarcinoma. Pt reports feeling well the last 2 weeks, appetite was good. Ab pain resolved. However, pain returned yesterday after she threw up. Had spicy [place] food. Pt noted to be tachycardic. Low grade temp of 100.1.  No numbness and tingling.\par CT on 6/17/22 showed Overall positive chemotherapeutic response with decrease in size of liver lesions and peritoneal implants in the right hemiabdomen. Slight increase in size of an implant in the alex hepatis.\par \par 7/20/22: Patient presents on C7D1 [place]/Cis/Durvalumab for metastatic cholangiocarcinoma\par + Another episode of abdominal pain and N/V, describes as sudden onset N/V followed by abdominal pain. + Intermittent fatigue. + Constipation. + Decreased appetite with subsequent weight.  No arthralgia/myalgia, hematuria, rash, peripheral edema, fever or SOB. \par \par 8/18/22: Patient presents on C8D1 [place]/Cis/Durvalumab for metastatic cholangiocarcinoma\par She has RLQ abd pain 1-2/10 which has much improved compared to before. Normal BMs. Appetite is getting better after chemo. Denies HA. CP, SOB,  constipation, \par \par

## 2022-09-26 NOTE — HISTORY OF PRESENT ILLNESS
[Disease: _____________________] : Disease: [unfilled] [AJCC Stage: ____] : AJCC Stage: [unfilled] [de-identified] : Metastatic cholangiocarcinoma\par C#1 of [place]/cis + durv on 3/16\par \par \par \par 55 yo F with h/o hyperlipemia, hypercholesterolemia, obesity and thyroid cancer who was f/w metastatic disease of unknown primary in February.\par \par She saw her PCP, Dr. Delvalle, c/o abdominal pain 2/8/22.  An abdominal U/S 2/6/22 showed multiple new hepatic lesions consistent with metastatic disease with evidence of periportal adenopathy without definitive pancreatic lesions seen.\par \par Follow up CT CAP 2/8/22 showed diffuse carcinomatosis with bulky omental implants predominantly in the right side of the abdomen. Prominence of the right adnexa measuring 3.5 x 3.7 cm. This may represent a primary ovarian carcinoma although this is not as large is typically seen with this degree of disease and other primary neoplasm may also be considered. Due to the large liver masses, suggestion of thrombus in the right portal vein and periportal lymphadenopathy, cholangiocarcinoma is also considered.\par Interval development of a few small pulmonary nodules may represent early metastatic disease.\par \par Pt is pain free currently. Eating well. BMs are normal. Denies unintentional weight loss. No family hx of malignancy. Pt is up to date on mammogram, pap smear. Never had a colonoscopy.\par \par \par Denies HA. CP, SOB, abd pain, constipation, diarrhea, melena, hematuria, dysuria.  [de-identified] : 3/8/22: Jessi is here with her  to finalized treatment plan. Pathology showed poorly differentiated adenocarcinoma morphologically compatible with a pancreaticobiliary tract/upper GI tract origine. Imaging consistent with biliary cancer. \par \par 3/30/22: Jessi is here for follow up. She received C#1 of [place]/cis + durv on 3/16. Had initial N/v. resolved with antiemetics. Has mild fatigue, lost some weight. RUQ and abd pain resolved. Tolerating well otherwise. \par \par 4/27/22: Patient presents on C3D1 [place]/Cis/Durvalumab for metastatic cholangiocarcinoma, + Fatigue. + Nausea/vomiting. + Constipation. + Decreased appetite. No arthralgia/myalgia, hematuria, rash, peripheral edema, fever or SOB. \par \par 5/25/22: C4D8 [place]/Cis/Durvalumab for metastatic cholangiocarcinoma, + Fatigue. + Nausea/vomiting the first day. + Constipation. + Decreased appetite. +pain RUQ 2 weeks ago. Better with oxycodone.\par \par Will scan mid June. after C5. \par \par 6/24/22: C5D15 [place]/Cis/Durvalumab for metastatic cholangiocarcinoma. Pt reports feeling well the last 2 weeks, appetite was good. Ab pain resolved. However, pain returned yesterday after she threw up. Had spicy [place] food. Pt noted to be tachycardic. Low grade temp of 100.1.  No numbness and tingling.\par CT on 6/17/22 showed Overall positive chemotherapeutic response with decrease in size of liver lesions and peritoneal implants in the right hemiabdomen. Slight increase in size of an implant in the alex hepatis.\par \par 7/20/22: Patient presents on C7D1 [place]/Cis/Durvalumab for metastatic cholangiocarcinoma\par + Another episode of abdominal pain and N/V, describes as sudden onset N/V followed by abdominal pain. + Intermittent fatigue. + Constipation. + Decreased appetite with subsequent weight.  No arthralgia/myalgia, hematuria, rash, peripheral edema, fever or SOB. \par \par 8/18/22: Patient presents on C8D1 [place]/Cis/Durvalumab for metastatic cholangiocarcinoma\par She has RLQ abd pain 1-2/10 which has much improved compared to before. Normal BMs. Appetite is getting better after chemo. Denies HA. CP, SOB,  constipation, \par \par 9/26/22: Pt is on cycle 10 or C2 of maintenance durvalumab monotherapy. Gained 2 lbs since Aug. appetite is better after chemo. CT c/ap on 9/15 showed Liver lesions are without significant change in size. Bulky peritoneal carcinomatosis demonstrates mixed pattern, stable/slightly decreased in the right hemiabdomen but new when enlarging in the left paracolic gutter and cul-de-sac. overall 14% decreased in tumor size. \par \par \par \par \par

## 2022-09-26 NOTE — PHYSICAL EXAM
[Restricted in physically strenuous activity but ambulatory and able to carry out work of a light or sedentary nature] : Status 1- Restricted in physically strenuous activity but ambulatory and able to carry out work of a light or sedentary nature, e.g., light house work, office work [Normal] : normal appearance, no rash, nodules, vesicles, ulcers, erythema [de-identified] : + tachycardia

## 2022-09-26 NOTE — ASSESSMENT
[FreeTextEntry1] : 55 yo F with h/o hyperlipemia, hypercholesterolemia, obesity and thyroid cancer diagnosed with metastatic cholangiocarcinoma\par \par # Cholangiocarcinoma\par -Pathology showed poorly differentiated adenocarcinoma morphologically compatible with a pancreaticobiliary tract/upper GI tract origine. Imaging consistent with biliary cancer. \par -CT CAP 2/8/22 showed diffuse carcinomatosis with bulky omental implants predominantly in the right side of the abdomen. Prominence of the right adnexa measuring 3.5 x 3.7 cm.\par -Started C1 of [place]/Cis + Durvalumab on 3/16\par -today is C7D1\par -TOPAZ 1 trial data presented at [facility] 2022 showed that the addition of Durvalumab to [place]/cis improved PFS and OS in think patient population compared to gem/cis + placebo.  OS with a hazard ratio (HR) of 0.80 (95% CI, 0.66-0.97; P = .021). PFS was also improved for the Durvalumab group (HR 0.75; 95% CI, 0.64-0.89; P = .001).\par -1500 mg of durvalumab every 3 weeks with 1000 mg/m2 of gemcitabine and and 25 mg/2 of cisplatin on days 1 and 8 every 21 days up to 8 cycles, followed by Durvalumab 1500 mg every 4 weeks\par -completed C8 on 8/17/22\par - CT c/ap on 9/15 showed Liver lesions are without significant change in size. Bulky peritoneal carcinomatosis demonstrates mixed pattern, stable/slightly decreased in the right hemiabdomen but new when enlarging in the left paracolic gutter and cul-de-sac. overall 14% decreased in tumor size.\par -continue with maintenance Durvalumab \par \par # supportive care\par -nutritionist following, coupons and Boost samples given \par -N/V -  zofran, compazine\par -abdominal pain -much improved with treawtment, continue oxycodone PRN\par -constipation - Miralax

## 2022-10-04 PROBLEM — Z13.31 DEPRESSION SCREENING: Status: ACTIVE | Noted: 2020-08-06

## 2022-10-04 PROBLEM — E78.5 HYPERLIPEMIA: Status: ACTIVE | Noted: 2017-08-17

## 2022-10-04 NOTE — HEALTH RISK ASSESSMENT
[Fair] :  ~his/her~ mood as fair [Never] : Never [Yes] : Yes [2 - 4 times a month (2 pts)] : 2-4 times a month (2 points) [1 or 2 (0 pts)] : 1 or 2 (0 points) [Never (0 pts)] : Never (0 points) [Patient reported mammogram was normal] : Patient reported mammogram was normal [Patient declined colonoscopy] : Patient declined colonoscopy [Behavioral] : behavioral [With Family] : lives with family [Employed] : employed [Smoke Detector] : smoke detector [Carbon Monoxide Detector] : carbon monoxide detector [Seat Belt] :  uses seat belt [Sunscreen] : uses sunscreen [Reviewed no changes] : Reviewed, no changes [Name: ___] : Health Care Proxy's Name: [unfilled]  [Relationship: ___] : Relationship: [unfilled] [Audit-CScore] : 2 [Reports changes in hearing] : Reports no changes in hearing [Reports changes in vision] : Reports no changes in vision [Reports changes in dental health] : Reports no changes in dental health [TB Exposure] : is not being exposed to tuberculosis [MammogramDate] : 9/2021 [MammogramComments] : scheduled next week [PapSmearDate] : 9/2022 [BoneDensityComments] : scheduled next week [AdvancecareDate] : 10/2022

## 2022-10-04 NOTE — HISTORY OF PRESENT ILLNESS
[FreeTextEntry1] : Annual [de-identified] : Ms. CLARENCE FELICIANO is a 57 year old female presenting for a follow up\par Seen by GYn Dr Millard Pap was normal, mammogram set up for next week.\par HLD on Simvastatin, recently restarted.\par Metastatic Cholangiocarcinoma Undergoing Immunotherapy, Chemo completed 8/22. [place]/ Cis with Durvalumab.\par Oncologist Dr Stuart.\par Needs FMLA forms filled.\par

## 2022-10-04 NOTE — ASSESSMENT
[FreeTextEntry1] : Annual\par SBIRT negative\par Depression screen negative\par Check comprehensive labs, in office today\par \par Vaccines \par Adacel declines\par Pneumovax 2017\par Shingrix declines\par Flu declines\par COVID booster 10/21\par EKG NSR no changes\par \par GYN\par  pap 9/2022\par Mammogram 9/2021\par Colonoscopy never went, states the oncologist had initially suggested it then was cancelled from their end.\par DEXA scheduled next week\par \par Metastatic Cholangiocarcinoma Undergoing Immunotherapy/ Chemo finished 8/22.\par Oncologist Dr Stuart\par Follow up in 6 months.

## 2022-10-25 PROBLEM — M79.2 NEUROPATHIC PAIN: Status: ACTIVE | Noted: 2022-01-01

## 2022-10-25 NOTE — HISTORY OF PRESENT ILLNESS
[Disease: _____________________] : Disease: [unfilled] [AJCC Stage: ____] : AJCC Stage: [unfilled] [de-identified] : Metastatic cholangiocarcinoma\par C#1 of [place]/cis + durv on 3/16\par \par \par \par 57 yo F with h/o hyperlipemia, hypercholesterolemia, obesity and thyroid cancer who was f/w metastatic disease of unknown primary in February.\par \par She saw her PCP, Dr. Delvalle, c/o abdominal pain 2/8/22.  An abdominal U/S 2/6/22 showed multiple new hepatic lesions consistent with metastatic disease with evidence of periportal adenopathy without definitive pancreatic lesions seen.\par \par Follow up CT CAP 2/8/22 showed diffuse carcinomatosis with bulky omental implants predominantly in the right side of the abdomen. Prominence of the right adnexa measuring 3.5 x 3.7 cm. This may represent a primary ovarian carcinoma although this is not as large is typically seen with this degree of disease and other primary neoplasm may also be considered. Due to the large liver masses, suggestion of thrombus in the right portal vein and periportal lymphadenopathy, cholangiocarcinoma is also considered.\par Interval development of a few small pulmonary nodules may represent early metastatic disease.\par \par Pt is pain free currently. Eating well. BMs are normal. Denies unintentional weight loss. No family hx of malignancy. Pt is up to date on mammogram, pap smear. Never had a colonoscopy.\par \par \par Denies HA. CP, SOB, abd pain, constipation, diarrhea, melena, hematuria, dysuria.  [de-identified] : 3/8/22: Jessi is here with her  to finalized treatment plan. Pathology showed poorly differentiated adenocarcinoma morphologically compatible with a pancreaticobiliary tract/upper GI tract origine. Imaging consistent with biliary cancer. \par \par 3/30/22: Jessi is here for follow up. She received C#1 of [place]/cis + durv on 3/16. Had initial N/v. resolved with antiemetics. Has mild fatigue, lost some weight. RUQ and abd pain resolved. Tolerating well otherwise. \par \par 4/27/22: Patient presents on C3D1 [place]/Cis/Durvalumab for metastatic cholangiocarcinoma, + Fatigue. + Nausea/vomiting. + Constipation. + Decreased appetite. No arthralgia/myalgia, hematuria, rash, peripheral edema, fever or SOB. \par \par 5/25/22: C4D8 [place]/Cis/Durvalumab for metastatic cholangiocarcinoma, + Fatigue. + Nausea/vomiting the first day. + Constipation. + Decreased appetite. +pain RUQ 2 weeks ago. Better with oxycodone.\par \par Will scan mid June. after C5. \par \par 6/24/22: C5D15 [place]/Cis/Durvalumab for metastatic cholangiocarcinoma. Pt reports feeling well the last 2 weeks, appetite was good. Ab pain resolved. However, pain returned yesterday after she threw up. Had spicy [place] food. Pt noted to be tachycardic. Low grade temp of 100.1.  No numbness and tingling.\par CT on 6/17/22 showed Overall positive chemotherapeutic response with decrease in size of liver lesions and peritoneal implants in the right hemiabdomen. Slight increase in size of an implant in the alex hepatis.\par \par 7/20/22: Patient presents on C7D1 [place]/Cis/Durvalumab for metastatic cholangiocarcinoma\par + Another episode of abdominal pain and N/V, describes as sudden onset N/V followed by abdominal pain. + Intermittent fatigue. + Constipation. + Decreased appetite with subsequent weight.  No arthralgia/myalgia, hematuria, rash, peripheral edema, fever or SOB. \par \par 8/18/22: Patient presents on C8D1 [place]/Cis/Durvalumab for metastatic cholangiocarcinoma\par She has RLQ abd pain 1-2/10 which has much improved compared to before. Normal BMs. Appetite is getting better after chemo. Denies HA. CP, SOB,  constipation, \par \par 9/26/22: Pt is on cycle 10 or C2 of maintenance durvalumab monotherapy. Gained 2 lbs since Aug. appetite is better after chemo. CT c/ap on 9/15 showed Liver lesions are without significant change in size. Bulky peritoneal carcinomatosis demonstrates mixed pattern, stable/slightly decreased in the right hemiabdomen but new when enlarging in the left paracolic gutter and cul-de-sac. overall 14% decreased in tumor size. \par \par 10/25/22:  Patient presents on maintenance Durvalumab monotherapy for metastatic cholangiocarcinoma\par + Mild fatigue, much improved since finishing [place]/Cis. + Mild intermittent cough. + Night sweats, every other night, but not soaking/drenching sweats, mostly "just feel hot."  + Mild  neuropathy, L > R foot, intermittent, finger tips very intermittent and mild. No SOB. No recent abdominal pain. No N/V/D/C. Appetite much improved with recent weight gain. No fevers. \par \par \par \par

## 2022-10-25 NOTE — ASSESSMENT
[FreeTextEntry1] : 57 yo F with h/o hyperlipemia, hypercholesterolemia, obesity and thyroid cancer diagnosed with metastatic cholangiocarcinoma\par \par # Cholangiocarcinoma\par -Pathology showed poorly differentiated adenocarcinoma morphologically compatible with a pancreaticobiliary tract/upper GI tract origine. Imaging consistent with biliary cancer. \par -CT CAP 2/8/22 showed diffuse carcinomatosis with bulky omental implants predominantly in the right side of the abdomen. Prominence of the right adnexa measuring 3.5 x 3.7 cm.\par -Started C1 of [place]/Cis + Durvalumab on 3/16/22\par -TOPAZ 1 trial data presented at [facility] 2022 showed that the addition of Durvalumab to [place]/cis improved PFS and OS in think patient population compared to gem/cis + placebo.  OS with a hazard ratio (HR) of 0.80 (95% CI, 0.66-0.97; P = .021). PFS was also improved for the Durvalumab group (HR 0.75; 95% CI, 0.64-0.89; P = .001).\par -now on maintenance Durvalumab monotherapy\par - CT c/ap on 9/15/22 showed liver lesions are without significant change in size. Bulky peritoneal carcinomatosis demonstrates mixed pattern, stable/slightly decreased in the right hemiabdomen but new when enlarging in the left paracolic gutter and cul-de-sac. overall 14% decreased in tumor size.\par -continue with maintenance Durvalumab \par \par # supportive care\par -nutritionist following, appetite improved and gained weight since last visit  \par -N/V resolved\par -abdominal pain intermittent and responds to pain meds\par -neuropathy, likely residual from previous chemo, will monitor and consider Dr. Yahaira ramirez

## 2022-10-25 NOTE — PHYSICAL EXAM
[Restricted in physically strenuous activity but ambulatory and able to carry out work of a light or sedentary nature] : Status 1- Restricted in physically strenuous activity but ambulatory and able to carry out work of a light or sedentary nature, e.g., light house work, office work [Normal] : normal appearance, no rash, nodules, vesicles, ulcers, erythema [de-identified] : + tachycardia

## 2022-11-17 NOTE — PROGRESS NOTE ADULT - PROBLEM SELECTOR PROBLEM 2
Cori Sanon is a 15 year old female who is being evaluated via a billable video visit.        How would you like to obtain your AVS? by Mail  Primary method for receiving video invitation: Text to cell phone: [phone]  If the video visit is dropped, the invitation should be resent by: N/A  Will anyone else be joining your video visit? No      Type of service:  Video Visit    Video-Visit Details    Video Start Time: 2:30    Video End Time:3:00  Originating Location (pt. Location): Home    Distant Location (provider location):  remote location    Platform used for Video Visit: Well         Dyslipidemia

## 2022-11-21 PROBLEM — G89.3 CANCER ASSOCIATED PAIN: Status: ACTIVE | Noted: 2022-01-01

## 2022-11-21 NOTE — HISTORY OF PRESENT ILLNESS
[Disease: _____________________] : Disease: [unfilled] [AJCC Stage: ____] : AJCC Stage: [unfilled] [de-identified] : Metastatic cholangiocarcinoma\par C#1 of [place]/cis + durv on 3/16\par \par \par \par 55 yo F with h/o hyperlipemia, hypercholesterolemia, obesity and thyroid cancer who was f/w metastatic disease of unknown primary in February.\par \par She saw her PCP, Dr. Delvalle, c/o abdominal pain 2/8/22.  An abdominal U/S 2/6/22 showed multiple new hepatic lesions consistent with metastatic disease with evidence of periportal adenopathy without definitive pancreatic lesions seen.\par \par Follow up CT CAP 2/8/22 showed diffuse carcinomatosis with bulky omental implants predominantly in the right side of the abdomen. Prominence of the right adnexa measuring 3.5 x 3.7 cm. This may represent a primary ovarian carcinoma although this is not as large is typically seen with this degree of disease and other primary neoplasm may also be considered. Due to the large liver masses, suggestion of thrombus in the right portal vein and periportal lymphadenopathy, cholangiocarcinoma is also considered.\par Interval development of a few small pulmonary nodules may represent early metastatic disease.\par \par Pt is pain free currently. Eating well. BMs are normal. Denies unintentional weight loss. No family hx of malignancy. Pt is up to date on mammogram, pap smear. Never had a colonoscopy.\par \par \par Denies HA. CP, SOB, abd pain, constipation, diarrhea, melena, hematuria, dysuria.  [de-identified] : 3/8/22: Jessi is here with her  to finalized treatment plan. Pathology showed poorly differentiated adenocarcinoma morphologically compatible with a pancreaticobiliary tract/upper GI tract origine. Imaging consistent with biliary cancer. \par \par 3/30/22: Jessi is here for follow up. She received C#1 of [place]/cis + durv on 3/16. Had initial N/v. resolved with antiemetics. Has mild fatigue, lost some weight. RUQ and abd pain resolved. Tolerating well otherwise. \par \par 4/27/22: Patient presents on C3D1 [place]/Cis/Durvalumab for metastatic cholangiocarcinoma, + Fatigue. + Nausea/vomiting. + Constipation. + Decreased appetite. No arthralgia/myalgia, hematuria, rash, peripheral edema, fever or SOB. \par \par 5/25/22: C4D8 [place]/Cis/Durvalumab for metastatic cholangiocarcinoma, + Fatigue. + Nausea/vomiting the first day. + Constipation. + Decreased appetite. +pain RUQ 2 weeks ago. Better with oxycodone.\par \par Will scan mid June. after C5. \par \par 6/24/22: C5D15 [place]/Cis/Durvalumab for metastatic cholangiocarcinoma. Pt reports feeling well the last 2 weeks, appetite was good. Ab pain resolved. However, pain returned yesterday after she threw up. Had spicy [place] food. Pt noted to be tachycardic. Low grade temp of 100.1.  No numbness and tingling.\par CT on 6/17/22 showed Overall positive chemotherapeutic response with decrease in size of liver lesions and peritoneal implants in the right hemiabdomen. Slight increase in size of an implant in the alex hepatis.\par \par 7/20/22: Patient presents on C7D1 [place]/Cis/Durvalumab for metastatic cholangiocarcinoma\par + Another episode of abdominal pain and N/V, describes as sudden onset N/V followed by abdominal pain. + Intermittent fatigue. + Constipation. + Decreased appetite with subsequent weight.  No arthralgia/myalgia, hematuria, rash, peripheral edema, fever or SOB. \par \par 8/18/22: Patient presents on C8D1 [place]/Cis/Durvalumab for metastatic cholangiocarcinoma\par She has RLQ abd pain 1-2/10 which has much improved compared to before. Normal BMs. Appetite is getting better after chemo. Denies HA. CP, SOB,  constipation, \par \par 9/26/22: Pt is on cycle 10 or C2 of maintenance durvalumab monotherapy. Gained 2 lbs since Aug. appetite is better after chemo. CT c/ap on 9/15 showed Liver lesions are without significant change in size. Bulky peritoneal carcinomatosis demonstrates mixed pattern, stable/slightly decreased in the right hemiabdomen but new when enlarging in the left paracolic gutter and cul-de-sac. overall 14% decreased in tumor size. \par \par 10/25/22:  Patient presents on maintenance Durvalumab monotherapy for metastatic cholangiocarcinoma\par + Mild fatigue, much improved since finishing [place]/Cis. + Mild intermittent cough. + Night sweats, every other night, but not soaking/drenching sweats, mostly "just feel hot."  + Mild  neuropathy, L > R foot, intermittent, finger tips very intermittent and mild. No SOB. No recent abdominal pain. No N/V/D/C. Appetite much improved with recent weight gain. No fevers. \par \par 11/21/22: Patient presents on maintenance Durvalumab monotherapy for metastatic cholangiocarcinoma.\par + Throbbing pain left lateral breast/ribs, intermittent, at least every other day, lasts 4-12 hours, 5-6/10 pain level. + Diffuse abdominal pain. + Mild constipation. + Mild fatigue, remains much improved since finishing [place]/Cis. + Mild intermittent cough. + Night sweats, every other night, but not soaking/drenching sweats, mostly "just feel hot." This remains and unchanged  + Mild  neuropathy, L > R foot, intermittent, finger tips very intermittent and mild, both unchanged. No SOB. No N/V/D. Appetite improved but mild recent weight loss. No rash/pruritus. No arthralgia. No fevers.

## 2022-11-21 NOTE — ASSESSMENT
[FreeTextEntry1] : 57 yo F with h/o hyperlipemia, hypercholesterolemia, obesity and thyroid cancer diagnosed with metastatic cholangiocarcinoma\par \par # Cholangiocarcinoma\par -Pathology showed poorly differentiated adenocarcinoma morphologically compatible with a pancreaticobiliary tract/upper GI tract origine. Imaging consistent with biliary cancer. \par -CT CAP 2/8/22 showed diffuse carcinomatosis with bulky omental implants predominantly in the right side of the abdomen. Prominence of the right adnexa measuring 3.5 x 3.7 cm.\par -Started C1 of [place]/Cis + Durvalumab on 3/16/22\par -TOPAZ 1 trial data presented at [facility] 2022 showed that the addition of Durvalumab to [place]/cis improved PFS and OS in think patient population compared to gem/cis + placebo.  OS with a hazard ratio (HR) of 0.80 (95% CI, 0.66-0.97; P = .021). PFS was also improved for the Durvalumab group (HR 0.75; 95% CI, 0.64-0.89; P = .001).\par -now on maintenance Durvalumab monotherapy\par - CT c/ap on 9/15/22 showed liver lesions are without significant change in size. Bulky peritoneal carcinomatosis demonstrates mixed pattern, stable/slightly decreased in the right hemiabdomen but new when enlarging in the left paracolic gutter and cul-de-sac. overall 14% decreased in tumor size.\par -continue with maintenance Durvalumab \par -restaging CT on 12/8/22 with Dr. Stuart follow up on 12/14 to review \par \par # supportive care\par -nutritionist following, appetite improved and weight stable overall \par -abdominal pain intermittent and responds to pain meds. Will consider CT early if new left side pain worsens \par -neuropathy, likely residual from previous chemo, will monitor and consider Dr. Syed evaluation. Stable recently

## 2022-11-21 NOTE — PHYSICAL EXAM
[Restricted in physically strenuous activity but ambulatory and able to carry out work of a light or sedentary nature] : Status 1- Restricted in physically strenuous activity but ambulatory and able to carry out work of a light or sedentary nature, e.g., light house work, office work [Normal] : normal appearance, no rash, nodules, vesicles, ulcers, erythema [de-identified] : + tachycardia

## 2022-12-14 NOTE — HISTORY OF PRESENT ILLNESS
[Disease: _____________________] : Disease: [unfilled] [AJCC Stage: ____] : AJCC Stage: [unfilled] [de-identified] : Metastatic cholangiocarcinoma\par C#1 of [place]/cis + durv on 3/16\par \par \par \par 55 yo F with h/o hyperlipemia, hypercholesterolemia, obesity and thyroid cancer who was f/w metastatic disease of unknown primary in February.\par \par She saw her PCP, Dr. Delvalle, c/o abdominal pain 2/8/22.  An abdominal U/S 2/6/22 showed multiple new hepatic lesions consistent with metastatic disease with evidence of periportal adenopathy without definitive pancreatic lesions seen.\par \par Follow up CT CAP 2/8/22 showed diffuse carcinomatosis with bulky omental implants predominantly in the right side of the abdomen. Prominence of the right adnexa measuring 3.5 x 3.7 cm. This may represent a primary ovarian carcinoma although this is not as large is typically seen with this degree of disease and other primary neoplasm may also be considered. Due to the large liver masses, suggestion of thrombus in the right portal vein and periportal lymphadenopathy, cholangiocarcinoma is also considered.\par Interval development of a few small pulmonary nodules may represent early metastatic disease.\par \par Pt is pain free currently. Eating well. BMs are normal. Denies unintentional weight loss. No family hx of malignancy. Pt is up to date on mammogram, pap smear. Never had a colonoscopy.\par \par \par Denies HA. CP, SOB, abd pain, constipation, diarrhea, melena, hematuria, dysuria.  [de-identified] : 3/8/22: Jessi is here with her  to finalized treatment plan. Pathology showed poorly differentiated adenocarcinoma morphologically compatible with a pancreaticobiliary tract/upper GI tract origine. Imaging consistent with biliary cancer. \par \par 3/30/22: Jessi is here for follow up. She received C#1 of [place]/cis + durv on 3/16. Had initial N/v. resolved with antiemetics. Has mild fatigue, lost some weight. RUQ and abd pain resolved. Tolerating well otherwise. \par \par 4/27/22: Patient presents on C3D1 [place]/Cis/Durvalumab for metastatic cholangiocarcinoma, + Fatigue. + Nausea/vomiting. + Constipation. + Decreased appetite. No arthralgia/myalgia, hematuria, rash, peripheral edema, fever or SOB. \par \par 5/25/22: C4D8 [place]/Cis/Durvalumab for metastatic cholangiocarcinoma, + Fatigue. + Nausea/vomiting the first day. + Constipation. + Decreased appetite. +pain RUQ 2 weeks ago. Better with oxycodone.\par \par Will scan mid June. after C5. \par \par 6/24/22: C5D15 [place]/Cis/Durvalumab for metastatic cholangiocarcinoma. Pt reports feeling well the last 2 weeks, appetite was good. Ab pain resolved. However, pain returned yesterday after she threw up. Had spicy [place] food. Pt noted to be tachycardic. Low grade temp of 100.1.  No numbness and tingling.\par CT on 6/17/22 showed Overall positive chemotherapeutic response with decrease in size of liver lesions and peritoneal implants in the right hemiabdomen. Slight increase in size of an implant in the alex hepatis.\par \par 7/20/22: Patient presents on C7D1 [place]/Cis/Durvalumab for metastatic cholangiocarcinoma\par + Another episode of abdominal pain and N/V, describes as sudden onset N/V followed by abdominal pain. + Intermittent fatigue. + Constipation. + Decreased appetite with subsequent weight.  No arthralgia/myalgia, hematuria, rash, peripheral edema, fever or SOB. \par \par 8/18/22: Patient presents on C8D1 [place]/Cis/Durvalumab for metastatic cholangiocarcinoma\par She has RLQ abd pain 1-2/10 which has much improved compared to before. Normal BMs. Appetite is getting better after chemo. Denies HA. CP, SOB,  constipation, \par \par 9/26/22: Pt is on cycle 10 or C2 of maintenance durvalumab monotherapy. Gained 2 lbs since Aug. appetite is better after chemo. CT c/ap on 9/15 showed Liver lesions are without significant change in size. Bulky peritoneal carcinomatosis demonstrates mixed pattern, stable/slightly decreased in the right hemiabdomen but new when enlarging in the left paracolic gutter and cul-de-sac. overall 14% decreased in tumor size. \par \par 10/25/22:  Patient presents on maintenance Durvalumab monotherapy for metastatic cholangiocarcinoma\par + Mild fatigue, much improved since finishing [place]/Cis. + Mild intermittent cough. + Night sweats, every other night, but not soaking/drenching sweats, mostly "just feel hot."  + Mild  neuropathy, L > R foot, intermittent, finger tips very intermittent and mild. No SOB. No recent abdominal pain. No N/V/D/C. Appetite much improved with recent weight gain. No fevers. \par \par 11/21/22: Patient presents on maintenance Durvalumab monotherapy for metastatic cholangiocarcinoma.\par + Throbbing pain left lateral breast/ribs, intermittent, at least every other day, lasts 4-12 hours, 5-6/10 pain level. + Diffuse abdominal pain. + Mild constipation. + Mild fatigue, remains much improved since finishing [place]/Cis. + Mild intermittent cough. + Night sweats, every other night, but not soaking/drenching sweats, mostly "just feel hot." This remains and unchanged  + Mild  neuropathy, L > R foot, intermittent, finger tips very intermittent and mild, both unchanged. No SOB. No N/V/D. Appetite improved but mild recent weight loss. No rash/pruritus. No arthralgia. No fevers. \par \par 12/14/22: Patient presents metastatic cholangiocarcinoma. She was on maintenance durvalumab. She has more pain in the abdomen. + fatigue, + mild neuropathy. Appetite\par CT c/ap on 12/8/22 showed increased in size of the primary hepatic tumor, hepatic, pulmonary, abd isak, omental and peritoneal metastatic dz, cosnsitent with PoD.\par

## 2022-12-14 NOTE — PHYSICAL EXAM
[Restricted in physically strenuous activity but ambulatory and able to carry out work of a light or sedentary nature] : Status 1- Restricted in physically strenuous activity but ambulatory and able to carry out work of a light or sedentary nature, e.g., light house work, office work [Normal] : normal appearance, no rash, nodules, vesicles, ulcers, erythema [de-identified] : + tachycardia

## 2022-12-14 NOTE — ASSESSMENT
[FreeTextEntry1] : 55 yo F with h/o hyperlipemia, hypercholesterolemia, obesity and thyroid cancer diagnosed with metastatic cholangiocarcinoma\par \par # Cholangiocarcinoma\par -Pathology showed poorly differentiated adenocarcinoma morphologically compatible with a pancreaticobiliary tract/upper GI tract origine. Imaging consistent with biliary cancer. \par -CT CAP 2/8/22 showed diffuse carcinomatosis with bulky omental implants predominantly in the right side of the abdomen. Prominence of the right adnexa measuring 3.5 x 3.7 cm.\par -Started C1 of [place]/Cis + Durvalumab on 3/16/22 based on TOPAZ 1 trial\par -CT c/ap on 9/15/22 showed overall 14% decreased in tumor size.\par -CT c/ap on 12/8/22 showed increased in size of the primary hepatic tumor, hepatic, pulmonary, abd isak, omental and peritoneal metastatic dz, consistent with PoD.\par -discussed with pt that we will switch to  5FU/LV + Onivyde based on phase II NIFTY trial -mOS 8.6 versus 5.5 months in 5Fu/LV alone\par -future tx options include FOLFOX (pt has mild neuropathy), single agent 5FU, Gemzar.\par -risks and benefits of treatment were discussed in detail. Questions and concerns were addressed to his satisfaction. Consent obtained. \par -will arrange for port placement\par \par \par \par # supportive care\par -nutritionist following, appetite improved and weight stable overall \par -abdominal pain intermittent and responds to pain meds. Will consider CT early if new left side pain worsens \par -neuropathy, likely residual from previous chemo, will monitor and consider Dr. Syed evaluation. Stable recently

## 2022-12-15 NOTE — ED ADULT NURSE REASSESSMENT NOTE - GENERAL PATIENT STATE
12/16/22                            David Her  [address]  [facility] [address]    To Whom It May Concern:    The above patient self reported positive COVID test on 12/12/22.  Please excuse from work to allow for 5 day COVID isolation.     RESTRICTIONS: none             Sincerely,     Gladys Andrade, DO   PGY-III Family Medicine Residency    12/16/22     [facility]   [address] [place] [address]   Phone: [phone]   Fax: [phone]      
comfortable appearance/cooperative

## 2022-12-24 NOTE — HISTORY OF PRESENT ILLNESS
[FreeTextEntry1] : PRE CALL PRIOR TO APPOINTMENT:  \par \par  Phone Number: [phone]\par \par  COVID-19 SWAB:  advised\par \par  RX on file:  yes\par \par  Referring MD:  Narciso\par \par  Appointment date:  12/21/22\par \par  Currently on Chemotherapy:    no          \par \par  CBC within 48 hours:  WBC:       ANC:  \par \par  Diabetic:  no\par \par  Clotting or Bleeding disorders:  no\par \par  PPM / Defibrillator:  no\par \par  NPO status advised:  yes\par \par  History of fall:     no\par \par  Assistant device for walking:  no\par \par   home:  confirmed\par \par  Blood Thinners:  eliquis\par \par  Prescribing MD agree to have medication held:  advised to confirm ability to hold with ordering MD\par \par  Capacity to make decisions: yes\par \par  HCP:  no\par \par  DNR:  no\par \par  Person contact for Pre-Call:  pt by Terrence\par \par  \par \par  Guidelines for Screening Anesthesia / Sedation Cases	(TYPE YES OR NO)  \par \par   Obtain Prescription / Authorization from Referring Physician: YES	 \par \par Medical Necessity (Justification of the need for Anesthesia / Sedation) from referring Physician: YES	 \par \par  Have you taken oral sedation? (e.g. Xanax, Valium, and other oral sedatives):  NO	 \par \par  Clearance to receive Anesthesia / Sedation: YES- BY Dr. Hall 	 \par \par  \par \par  ANESTHESIA EXCLUSION CRITERIA QUESTIONNAIRE:	 \par \par Difficult Airway: (TYPE YES OR NO) 	 \par \par Previous Problem with Anesthesia or sedation: NO	 \par \par  History of Difficult IntubatioN: NO	 \par \par  Stridor, Snoring, Sleep Apnea: NO	 \par \par  Tonsils / Adenoids present: YES	 \par \par  Dysmorphic Facial Features: NO	 \par \par  Cervical Spine Fusion/ Cervical Spine Surgery: NO	 \par \par  Tumor in Airway: NO	 \par \par  Trauma to Airway: NO	 \par \par  Radiation therapy to head / neck: NO	 \par \par  Advanced Rheumatoid ArthritiS: NO	 \par \par  Active Cardiac Ischemia (as defined by EKG or Laboratory  Examination): NO	 \par \par  Severe COPD / Home O2: NO	 \par \par  Known or Suspected Increased Intracranial pressure: NO	 \par \par  Patient with BMI of 40 or greater : NO    Weight (kgs.) 155lbs____ Height (ft.) ___5'3''___       BMI_______	 	 \par \par  Patients with Increased Risk of Aspiration: (TYPE YES OR NO) 		 \par \par  Abnormal Airway: NO	 \par \par  Hiatal Hernia with Reflux: NO	 \par \par  Pregnancy: NO	 	 \par \par  Altered Mental State: NO 	 	 \par \par  Spinal Cord Injury with Paraplegia or Quadriplegia: NO	 	 \par \par  History of delayed Gastric Emptying: NO 	 	 \par \par  ASA Physical Status of 3 or greater (See Appendix 1 from policy ANSL.5502) 	 	 \par \par \par Malignant Hyperthermia Screening: (TYPE YES OR NO) 	 \par \par Family history unexpected death following anesthesia: NO	 \par \par  Family or personal history of Malignant Hyperthermia: NO  	 \par \par   A muscle or neuromuscular disorder: NO 	 \par \par  \par \par  Bhavya -Operative Assessment ( Day of Procedure)  \par  \par  Procedure:  mediport insertion\par \par  Indication:  cancer\par \par  NPO status: 9pm 12/20\par \par  Accompanied by:  spouse Brandyn [phone]\par \par  Falls risk:  no\par \par  \par \par  IVL:  \par \par  IR MD: Dr. Laz Umanzor  \par \par  Urine Pregnancy: na\par \par  Pre-Op instructions: provided\par \par  POST-OP teaching initiated:  yes\par \par  Allergy bracelet on: na\par \par  Anesthesia plan discussed with IR MD:  yes\par \par  Antibiotic given:\par

## 2023-01-01 ENCOUNTER — APPOINTMENT (OUTPATIENT)
Dept: HEMATOLOGY ONCOLOGY | Facility: CLINIC | Age: 58
End: 2023-01-01

## 2023-01-01 ENCOUNTER — RESULT REVIEW (OUTPATIENT)
Age: 58
End: 2023-01-01

## 2023-01-01 ENCOUNTER — APPOINTMENT (OUTPATIENT)
Age: 58
End: 2023-01-01

## 2023-01-01 ENCOUNTER — APPOINTMENT (OUTPATIENT)
Dept: HEMATOLOGY ONCOLOGY | Facility: CLINIC | Age: 58
End: 2023-01-01
Payer: COMMERCIAL

## 2023-01-01 ENCOUNTER — OUTPATIENT (OUTPATIENT)
Dept: OUTPATIENT SERVICES | Facility: HOSPITAL | Age: 58
LOS: 1 days | Discharge: HOME | End: 2023-01-01

## 2023-01-01 ENCOUNTER — FORM ENCOUNTER (OUTPATIENT)
Age: 58
End: 2023-01-01

## 2023-01-01 VITALS
OXYGEN SATURATION: 98 % | HEIGHT: 62 IN | HEART RATE: 85 BPM | DIASTOLIC BLOOD PRESSURE: 84 MMHG | WEIGHT: 142.02 LBS | BODY MASS INDEX: 26.13 KG/M2 | SYSTOLIC BLOOD PRESSURE: 131 MMHG

## 2023-01-01 DIAGNOSIS — C78.7 INTRAHEPATIC BILE DUCT CARCINOMA: ICD-10-CM

## 2023-01-01 DIAGNOSIS — Z51.89 ENCOUNTER FOR OTHER SPECIFIED AFTERCARE: ICD-10-CM

## 2023-01-01 DIAGNOSIS — Z98.89 OTHER SPECIFIED POSTPROCEDURAL STATES: Chronic | ICD-10-CM

## 2023-01-01 DIAGNOSIS — C22.1 INTRAHEPATIC BILE DUCT CARCINOMA: ICD-10-CM

## 2023-01-01 DIAGNOSIS — Z90.710 ACQUIRED ABSENCE OF BOTH CERVIX AND UTERUS: Chronic | ICD-10-CM

## 2023-01-01 DIAGNOSIS — C78.7 SECONDARY MALIGNANT NEOPLASM OF LIVER AND INTRAHEPATIC BILE DUCT: ICD-10-CM

## 2023-01-01 DIAGNOSIS — R11.2 NAUSEA WITH VOMITING, UNSPECIFIED: ICD-10-CM

## 2023-01-01 DIAGNOSIS — Z51.11 ENCOUNTER FOR ANTINEOPLASTIC CHEMOTHERAPY: ICD-10-CM

## 2023-01-01 LAB
ALBUMIN SERPL ELPH-MCNC: 3.6 G/DL — SIGNIFICANT CHANGE UP (ref 3.3–5)
ALBUMIN SERPL ELPH-MCNC: 3.7 G/DL — SIGNIFICANT CHANGE UP (ref 3.3–5)
ALBUMIN SERPL ELPH-MCNC: 4.2 G/DL — SIGNIFICANT CHANGE UP (ref 3.3–5)
ALBUMIN SERPL ELPH-MCNC: 4.4 G/DL
ALP BLD-CCNC: 460 U/L
ALP SERPL-CCNC: 391 U/L — HIGH (ref 40–120)
ALP SERPL-CCNC: 421 U/L — HIGH (ref 40–120)
ALP SERPL-CCNC: 450 U/L — HIGH (ref 40–120)
ALT FLD-CCNC: 20 U/L — SIGNIFICANT CHANGE UP (ref 10–45)
ALT FLD-CCNC: 36 U/L — SIGNIFICANT CHANGE UP (ref 10–45)
ALT FLD-CCNC: 42 U/L — SIGNIFICANT CHANGE UP (ref 10–45)
ALT SERPL-CCNC: 47 U/L
ANION GAP SERPL CALC-SCNC: 12 MMOL/L
ANION GAP SERPL CALC-SCNC: 12 MMOL/L — SIGNIFICANT CHANGE UP (ref 5–17)
ANION GAP SERPL CALC-SCNC: 12 MMOL/L — SIGNIFICANT CHANGE UP (ref 5–17)
ANION GAP SERPL CALC-SCNC: 15 MMOL/L — SIGNIFICANT CHANGE UP (ref 5–17)
ANISOCYTOSIS BLD QL: SLIGHT — SIGNIFICANT CHANGE UP
AST SERPL-CCNC: 35 U/L — SIGNIFICANT CHANGE UP (ref 10–40)
AST SERPL-CCNC: 42 U/L
AST SERPL-CCNC: 51 U/L — HIGH (ref 10–40)
AST SERPL-CCNC: 65 U/L — HIGH (ref 10–40)
BASOPHILS # BLD AUTO: 0 K/UL — SIGNIFICANT CHANGE UP (ref 0–0.2)
BASOPHILS # BLD AUTO: 0.1 K/UL — SIGNIFICANT CHANGE UP (ref 0–0.2)
BASOPHILS # BLD AUTO: 0.2 K/UL — SIGNIFICANT CHANGE UP (ref 0–0.2)
BASOPHILS # BLD AUTO: 0.9 K/UL — HIGH (ref 0–0.2)
BASOPHILS NFR BLD AUTO: 0.8 % — SIGNIFICANT CHANGE UP (ref 0–2)
BASOPHILS NFR BLD AUTO: 1.6 % — SIGNIFICANT CHANGE UP (ref 0–2)
BASOPHILS NFR BLD AUTO: 2.5 % — HIGH (ref 0–2)
BILIRUB SERPL-MCNC: 0.3 MG/DL — SIGNIFICANT CHANGE UP (ref 0.2–1.2)
BILIRUB SERPL-MCNC: 0.4 MG/DL
BILIRUB SERPL-MCNC: 0.4 MG/DL — SIGNIFICANT CHANGE UP (ref 0.2–1.2)
BILIRUB SERPL-MCNC: 0.4 MG/DL — SIGNIFICANT CHANGE UP (ref 0.2–1.2)
BUN SERPL-MCNC: 11 MG/DL — SIGNIFICANT CHANGE UP (ref 7–23)
BUN SERPL-MCNC: 13 MG/DL
BUN SERPL-MCNC: 13 MG/DL — SIGNIFICANT CHANGE UP (ref 7–23)
BUN SERPL-MCNC: 13 MG/DL — SIGNIFICANT CHANGE UP (ref 7–23)
CALCIUM SERPL-MCNC: 10.1 MG/DL — SIGNIFICANT CHANGE UP (ref 8.4–10.5)
CALCIUM SERPL-MCNC: 10.5 MG/DL — SIGNIFICANT CHANGE UP (ref 8.4–10.5)
CALCIUM SERPL-MCNC: 10.7 MG/DL
CALCIUM SERPL-MCNC: 9.9 MG/DL — SIGNIFICANT CHANGE UP (ref 8.4–10.5)
CANCER AG19-9 SERPL-ACNC: 323 U/ML
CEA SERPL-MCNC: 5.6 NG/ML
CHLORIDE SERPL-SCNC: 102 MMOL/L — SIGNIFICANT CHANGE UP (ref 96–108)
CHLORIDE SERPL-SCNC: 96 MMOL/L — SIGNIFICANT CHANGE UP (ref 96–108)
CHLORIDE SERPL-SCNC: 97 MMOL/L
CHLORIDE SERPL-SCNC: 97 MMOL/L — SIGNIFICANT CHANGE UP (ref 96–108)
CO2 SERPL-SCNC: 21 MMOL/L — LOW (ref 22–31)
CO2 SERPL-SCNC: 23 MMOL/L — SIGNIFICANT CHANGE UP (ref 22–31)
CO2 SERPL-SCNC: 24 MMOL/L
CO2 SERPL-SCNC: 26 MMOL/L — SIGNIFICANT CHANGE UP (ref 22–31)
CREAT SERPL-MCNC: 0.72 MG/DL — SIGNIFICANT CHANGE UP (ref 0.5–1.3)
CREAT SERPL-MCNC: 0.87 MG/DL — SIGNIFICANT CHANGE UP (ref 0.5–1.3)
CREAT SERPL-MCNC: 1 MG/DL — SIGNIFICANT CHANGE UP (ref 0.5–1.3)
CREAT SERPL-MCNC: 1.01 MG/DL
EGFR: 65 ML/MIN/1.73M2
EGFR: 66 ML/MIN/1.73M2 — SIGNIFICANT CHANGE UP
EGFR: 78 ML/MIN/1.73M2 — SIGNIFICANT CHANGE UP
EGFR: 98 ML/MIN/1.73M2 — SIGNIFICANT CHANGE UP
ELLIPTOCYTES BLD QL SMEAR: SLIGHT — SIGNIFICANT CHANGE UP
EOSINOPHIL # BLD AUTO: 0 K/UL — SIGNIFICANT CHANGE UP (ref 0–0.5)
EOSINOPHIL # BLD AUTO: 0.1 K/UL — SIGNIFICANT CHANGE UP (ref 0–0.5)
EOSINOPHIL # BLD AUTO: 0.3 K/UL — SIGNIFICANT CHANGE UP (ref 0–0.5)
EOSINOPHIL # BLD AUTO: 0.5 K/UL — SIGNIFICANT CHANGE UP (ref 0–0.5)
EOSINOPHIL NFR BLD AUTO: 0.6 % — SIGNIFICANT CHANGE UP (ref 0–6)
EOSINOPHIL NFR BLD AUTO: 7.4 % — HIGH (ref 0–6)
EOSINOPHIL NFR BLD AUTO: 7.8 % — HIGH (ref 0–6)
GLUCOSE SERPL-MCNC: 101 MG/DL — HIGH (ref 70–99)
GLUCOSE SERPL-MCNC: 104 MG/DL — HIGH (ref 70–99)
GLUCOSE SERPL-MCNC: 119 MG/DL
GLUCOSE SERPL-MCNC: 95 MG/DL — SIGNIFICANT CHANGE UP (ref 70–99)
HCT VFR BLD CALC: 31.6 % — LOW (ref 34.5–45)
HCT VFR BLD CALC: 32.9 % — LOW (ref 34.5–45)
HCT VFR BLD CALC: 33.7 % — LOW (ref 34.5–45)
HCT VFR BLD CALC: 35.2 % — SIGNIFICANT CHANGE UP (ref 34.5–45)
HGB BLD-MCNC: 10.6 G/DL — LOW (ref 11.5–15.5)
HGB BLD-MCNC: 10.9 G/DL — LOW (ref 11.5–15.5)
HGB BLD-MCNC: 11.2 G/DL — LOW (ref 11.5–15.5)
HGB BLD-MCNC: 11.5 G/DL — SIGNIFICANT CHANGE UP (ref 11.5–15.5)
LYMPHOCYTES # BLD AUTO: 1.2 K/UL — SIGNIFICANT CHANGE UP (ref 1–3.3)
LYMPHOCYTES # BLD AUTO: 1.4 K/UL — SIGNIFICANT CHANGE UP (ref 1–3.3)
LYMPHOCYTES # BLD AUTO: 1.7 K/UL — SIGNIFICANT CHANGE UP (ref 1–3.3)
LYMPHOCYTES # BLD AUTO: 1.9 K/UL — SIGNIFICANT CHANGE UP (ref 1–3.3)
LYMPHOCYTES # BLD AUTO: 14 % — SIGNIFICANT CHANGE UP (ref 13–44)
LYMPHOCYTES # BLD AUTO: 25.6 % — SIGNIFICANT CHANGE UP (ref 13–44)
LYMPHOCYTES # BLD AUTO: 28.4 % — SIGNIFICANT CHANGE UP (ref 13–44)
LYMPHOCYTES # BLD AUTO: 42.7 % — SIGNIFICANT CHANGE UP (ref 13–44)
MACROCYTES BLD QL: SLIGHT — SIGNIFICANT CHANGE UP
MAGNESIUM SERPL-MCNC: 1.6 MG/DL
MCHC RBC-ENTMCNC: 25.7 PG — LOW (ref 27–34)
MCHC RBC-ENTMCNC: 25.7 PG — LOW (ref 27–34)
MCHC RBC-ENTMCNC: 25.8 PG — LOW (ref 27–34)
MCHC RBC-ENTMCNC: 26 PG — LOW (ref 27–34)
MCHC RBC-ENTMCNC: 32.8 G/DL — SIGNIFICANT CHANGE UP (ref 32–36)
MCHC RBC-ENTMCNC: 33 G/DL — SIGNIFICANT CHANGE UP (ref 32–36)
MCHC RBC-ENTMCNC: 33.2 G/DL — SIGNIFICANT CHANGE UP (ref 32–36)
MCHC RBC-ENTMCNC: 33.4 G/DL — SIGNIFICANT CHANGE UP (ref 32–36)
MCV RBC AUTO: 76.7 FL — LOW (ref 80–100)
MCV RBC AUTO: 78 FL — LOW (ref 80–100)
MCV RBC AUTO: 78.2 FL — LOW (ref 80–100)
MCV RBC AUTO: 78.8 FL — LOW (ref 80–100)
MICROCYTES BLD QL: SLIGHT — SIGNIFICANT CHANGE UP
MONOCYTES # BLD AUTO: 0.4 K/UL — SIGNIFICANT CHANGE UP (ref 0–0.9)
MONOCYTES # BLD AUTO: 0.5 K/UL — SIGNIFICANT CHANGE UP (ref 0–0.9)
MONOCYTES # BLD AUTO: 1.1 K/UL — HIGH (ref 0–0.9)
MONOCYTES # BLD AUTO: 2.5 K/UL — HIGH (ref 0–0.9)
MONOCYTES NFR BLD AUTO: 10 % — SIGNIFICANT CHANGE UP (ref 2–14)
MONOCYTES NFR BLD AUTO: 13.4 % — SIGNIFICANT CHANGE UP (ref 2–14)
MONOCYTES NFR BLD AUTO: 15.3 % — HIGH (ref 2–14)
MONOCYTES NFR BLD AUTO: 8.6 % — SIGNIFICANT CHANGE UP (ref 2–14)
NEUTROPHILS # BLD AUTO: 1.2 K/UL — LOW (ref 1.8–7.4)
NEUTROPHILS # BLD AUTO: 15.2 K/UL — HIGH (ref 1.8–7.4)
NEUTROPHILS # BLD AUTO: 3.4 K/UL — SIGNIFICANT CHANGE UP (ref 1.8–7.4)
NEUTROPHILS # BLD AUTO: 4.2 K/UL — SIGNIFICANT CHANGE UP (ref 1.8–7.4)
NEUTROPHILS NFR BLD AUTO: 34.5 % — LOW (ref 43–77)
NEUTROPHILS NFR BLD AUTO: 54.8 % — SIGNIFICANT CHANGE UP (ref 43–77)
NEUTROPHILS NFR BLD AUTO: 55.9 % — SIGNIFICANT CHANGE UP (ref 43–77)
NEUTROPHILS NFR BLD AUTO: 76 % — SIGNIFICANT CHANGE UP (ref 43–77)
OVALOCYTES BLD QL SMEAR: SLIGHT — SIGNIFICANT CHANGE UP
PLAT MORPH BLD: NORMAL — SIGNIFICANT CHANGE UP
PLATELET # BLD AUTO: 182 K/UL — SIGNIFICANT CHANGE UP (ref 150–400)
PLATELET # BLD AUTO: 200 K/UL — SIGNIFICANT CHANGE UP (ref 150–400)
PLATELET # BLD AUTO: 225 K/UL — SIGNIFICANT CHANGE UP (ref 150–400)
PLATELET # BLD AUTO: 232 K/UL — SIGNIFICANT CHANGE UP (ref 150–400)
POIKILOCYTOSIS BLD QL AUTO: SLIGHT — SIGNIFICANT CHANGE UP
POLYCHROMASIA BLD QL SMEAR: SLIGHT — SIGNIFICANT CHANGE UP
POTASSIUM SERPL-MCNC: 3.9 MMOL/L — SIGNIFICANT CHANGE UP (ref 3.5–5.3)
POTASSIUM SERPL-MCNC: 4 MMOL/L — SIGNIFICANT CHANGE UP (ref 3.5–5.3)
POTASSIUM SERPL-MCNC: 4.2 MMOL/L — SIGNIFICANT CHANGE UP (ref 3.5–5.3)
POTASSIUM SERPL-SCNC: 3.9 MMOL/L — SIGNIFICANT CHANGE UP (ref 3.5–5.3)
POTASSIUM SERPL-SCNC: 4 MMOL/L
POTASSIUM SERPL-SCNC: 4 MMOL/L — SIGNIFICANT CHANGE UP (ref 3.5–5.3)
POTASSIUM SERPL-SCNC: 4.2 MMOL/L — SIGNIFICANT CHANGE UP (ref 3.5–5.3)
PROT SERPL-MCNC: 6.9 G/DL — SIGNIFICANT CHANGE UP (ref 6–8.3)
PROT SERPL-MCNC: 7 G/DL
PROT SERPL-MCNC: 7 G/DL — SIGNIFICANT CHANGE UP (ref 6–8.3)
PROT SERPL-MCNC: 7.7 G/DL — SIGNIFICANT CHANGE UP (ref 6–8.3)
RBC # BLD: 4.11 M/UL — SIGNIFICANT CHANGE UP (ref 3.8–5.2)
RBC # BLD: 4.22 M/UL — SIGNIFICANT CHANGE UP (ref 3.8–5.2)
RBC # BLD: 4.31 M/UL — SIGNIFICANT CHANGE UP (ref 3.8–5.2)
RBC # BLD: 4.46 M/UL — SIGNIFICANT CHANGE UP (ref 3.8–5.2)
RBC # FLD: 11.2 % — SIGNIFICANT CHANGE UP (ref 10.3–14.5)
RBC # FLD: 11.9 % — SIGNIFICANT CHANGE UP (ref 10.3–14.5)
RBC # FLD: 12.3 % — SIGNIFICANT CHANGE UP (ref 10.3–14.5)
RBC # FLD: 14.8 % — HIGH (ref 10.3–14.5)
RBC BLD AUTO: SIGNIFICANT CHANGE UP
SODIUM SERPL-SCNC: 131 MMOL/L — LOW (ref 135–145)
SODIUM SERPL-SCNC: 134 MMOL/L
SODIUM SERPL-SCNC: 136 MMOL/L — SIGNIFICANT CHANGE UP (ref 135–145)
SODIUM SERPL-SCNC: 136 MMOL/L — SIGNIFICANT CHANGE UP (ref 135–145)
WBC # BLD: 19.4 K/UL — HIGH (ref 3.8–10.5)
WBC # BLD: 3.4 K/UL — LOW (ref 3.8–10.5)
WBC # BLD: 6.1 K/UL — SIGNIFICANT CHANGE UP (ref 3.8–10.5)
WBC # BLD: 7.5 K/UL — SIGNIFICANT CHANGE UP (ref 3.8–10.5)
WBC # FLD AUTO: 19.4 K/UL — HIGH (ref 3.8–10.5)
WBC # FLD AUTO: 3.4 K/UL — LOW (ref 3.8–10.5)
WBC # FLD AUTO: 6.1 K/UL — SIGNIFICANT CHANGE UP (ref 3.8–10.5)
WBC # FLD AUTO: 7.5 K/UL — SIGNIFICANT CHANGE UP (ref 3.8–10.5)

## 2023-01-01 PROCEDURE — 99215 OFFICE O/P EST HI 40 MIN: CPT

## 2023-01-01 RX ORDER — APIXABAN 2.5 MG/1
1 TABLET, FILM COATED ORAL
Qty: 0 | Refills: 0 | DISCHARGE

## 2023-01-01 RX ORDER — PROCHLORPERAZINE MALEATE 10 MG/1
10 TABLET ORAL
Qty: 180 | Refills: 0 | Status: ACTIVE | COMMUNITY
Start: 2022-01-01 | End: 1900-01-01

## 2023-01-01 RX ORDER — DIPHENOXYLATE HYDROCHLORIDE AND ATROPINE SULFATE 2.5; .025 MG/1; MG/1
2.5-0.025 TABLET ORAL
Qty: 120 | Refills: 0 | Status: ACTIVE | COMMUNITY
Start: 2023-01-01 | End: 1900-01-01

## 2023-01-01 RX ORDER — ONDANSETRON 8 MG/1
8 TABLET ORAL EVERY 8 HOURS
Qty: 90 | Refills: 0 | Status: ACTIVE | COMMUNITY
Start: 2022-01-01 | End: 1900-01-01

## 2023-01-01 RX ORDER — APIXABAN 5 MG/1
5 TABLET, FILM COATED ORAL
Qty: 180 | Refills: 1 | Status: ACTIVE | COMMUNITY
Start: 2022-01-01 | End: 1900-01-01

## 2023-01-01 RX ORDER — OXYCODONE 5 MG/1
5 TABLET ORAL
Qty: 120 | Refills: 0 | Status: ACTIVE | COMMUNITY
Start: 2022-01-01 | End: 1900-01-01

## 2023-01-01 RX ORDER — LIDOCAINE AND PRILOCAINE 25; 25 MG/G; MG/G
2.5-2.5 CREAM TOPICAL
Qty: 30 | Refills: 1 | Status: ACTIVE | COMMUNITY
Start: 2023-01-01 | End: 1900-01-01

## 2023-01-27 PROBLEM — C22.1 CHOLANGIOCARCINOMA METASTATIC TO LIVER: Status: ACTIVE | Noted: 2022-01-01

## 2023-02-08 NOTE — ASSESSMENT
[FreeTextEntry1] : 57 yo F with h/o hyperlipemia, hypercholesterolemia, obesity and thyroid cancer diagnosed with metastatic cholangiocarcinoma\par \par # Cholangiocarcinoma\par -Pathology showed poorly differentiated adenocarcinoma morphologically compatible with a pancreaticobiliary tract/upper GI tract origine. Imaging consistent with biliary cancer. \par -CT CAP 2/8/22 showed diffuse carcinomatosis with bulky omental implants predominantly in the right side of the abdomen. Prominence of the right adnexa measuring 3.5 x 3.7 cm.\par -Started C1 of [place]/Cis + Durvalumab on 3/16/22 based on TOPAZ 1 trial\par -CT c/ap on 9/15/22 showed overall 14% decreased in tumor size.\par -CT c/ap on 12/8/22 showed increased in size of the primary hepatic tumor, hepatic, pulmonary, abd isak, omental and peritoneal metastatic dz, consistent with PoD.\par - patient started on 5FU/LV + Onivyde based on phase II NIFTY trial -mOS 8.6 versus 5.5 months in 5Fu/LV alone. \par - Consider dose reduction of onivyde to 50 mg/m2 if fatigue limits her quality of life. \par - follow up CA-19-9 and CEA \par - Follow up CT scan at next visit. \par \par # supportive care\par -nutritionist following, appetite improved and weight stable overall \par -Patient to start THC for nausea. Patient taking compazine. Also counseled patient on taking zofran. \par -abdominal pain intermittent and responds to pain meds. Will consider CT early if new left side pain worsens \par -neuropathy, likely residual from previous chemo, will monitor and consider Dr. Syed evaluation. Stable recently

## 2023-02-08 NOTE — HISTORY OF PRESENT ILLNESS
[Disease: _____________________] : Disease: [unfilled] [AJCC Stage: ____] : AJCC Stage: [unfilled] [de-identified] : \par \par 55 yo F with h/o hyperlipemia, hypercholesterolemia, obesity and thyroid cancer who was f/w metastatic disease of unknown primary in February.\par \par She saw her PCP, Dr. Delvalle, c/o abdominal pain 2/8/22.  An abdominal U/S 2/6/22 showed multiple new hepatic lesions consistent with metastatic disease with evidence of periportal adenopathy without definitive pancreatic lesions seen.\par \par Follow up CT CAP 2/8/22 showed diffuse carcinomatosis with bulky omental implants predominantly in the right side of the abdomen. Prominence of the right adnexa measuring 3.5 x 3.7 cm. This may represent a primary ovarian carcinoma although this is not as large is typically seen with this degree of disease and other primary neoplasm may also be considered. Due to the large liver masses, suggestion of thrombus in the right portal vein and periportal lymphadenopathy, cholangiocarcinoma is also considered.\par Interval development of a few small pulmonary nodules may represent early metastatic disease.\par \par Pt is pain free currently. Eating well. BMs are normal. Denies unintentional weight loss. No family hx of malignancy. Pt is up to date on mammogram, pap smear. Never had a colonoscopy.\par \par \par Denies HA. CP, SOB, abd pain, constipation, diarrhea, melena, hematuria, dysuria.  [de-identified] : 3/8/22: Jessi is here with her  to finalized treatment plan. Pathology showed poorly differentiated adenocarcinoma morphologically compatible with a pancreaticobiliary tract/upper GI tract origine. Imaging consistent with biliary cancer. \par \par 3/30/22: Jessi is here for follow up. She received C#1 of Tama/cis + durv on 3/16. Had initial N/v. resolved with antiemetics. Has mild fatigue, lost some weight. RUQ and abd pain resolved. Tolerating well otherwise. \par \par 4/27/22: Patient presents on C3D1 Tama/Cis/Durvalumab for metastatic cholangiocarcinoma, + Fatigue. + Nausea/vomiting. + Constipation. + Decreased appetite. No arthralgia/myalgia, hematuria, rash, peripheral edema, fever or SOB. \par \par 5/25/22: C4D8 Tama/Cis/Durvalumab for metastatic cholangiocarcinoma, + Fatigue. + Nausea/vomiting the first day. + Constipation. + Decreased appetite. +pain RUQ 2 weeks ago. Better with oxycodone.\par \par Will scan mid June. after C5. \par \par 6/24/22: C5D15 Tama/Cis/Durvalumab for metastatic cholangiocarcinoma. Pt reports feeling well the last 2 weeks, appetite was good. Ab pain resolved. However, pain returned yesterday after she threw up. Had spicy [place] food. Pt noted to be tachycardic. Low grade temp of 100.1.  No numbness and tingling.\par CT on 6/17/22 showed Overall positive chemotherapeutic response with decrease in size of liver lesions and peritoneal implants in the right hemiabdomen. Slight increase in size of an implant in the alex hepatis.\par \par 7/20/22: Patient presents on C7D1 Tama/Cis/Durvalumab for metastatic cholangiocarcinoma\par + Another episode of abdominal pain and N/V, describes as sudden onset N/V followed by abdominal pain. + Intermittent fatigue. + Constipation. + Decreased appetite with subsequent weight.  No arthralgia/myalgia, hematuria, rash, peripheral edema, fever or SOB. \par \par 8/18/22: Patient presents on C8D1 Tama/Cis/Durvalumab for metastatic cholangiocarcinoma\par She has RLQ abd pain 1-2/10 which has much improved compared to before. Normal BMs. Appetite is getting better after chemo. Denies HA. CP, SOB,  constipation, \par \par 9/26/22: Pt is on cycle 10 or C2 of maintenance durvalumab monotherapy. Gained 2 lbs since Aug. appetite is better after chemo. CT c/ap on 9/15 showed Liver lesions are without significant change in size. Bulky peritoneal carcinomatosis demonstrates mixed pattern, stable/slightly decreased in the right hemiabdomen but new when enlarging in the left paracolic gutter and cul-de-sac. overall 14% decreased in tumor size. \par \par 10/25/22:  Patient presents on maintenance Durvalumab monotherapy for metastatic cholangiocarcinoma\par + Mild fatigue, much improved since finishing Tama/Cis. + Mild intermittent cough. + Night sweats, every other night, but not soaking/drenching sweats, mostly "just feel hot."  + Mild  neuropathy, L > R foot, intermittent, finger tips very intermittent and mild. No SOB. No recent abdominal pain. No N/V/D/C. Appetite much improved with recent weight gain. No fevers. \par \par 11/21/22: Patient presents on maintenance Durvalumab monotherapy for metastatic cholangiocarcinoma.\par + Throbbing pain left lateral breast/ribs, intermittent, at least every other day, lasts 4-12 hours, 5-6/10 pain level. + Diffuse abdominal pain. + Mild constipation. + Mild fatigue, remains much improved since finishing Tama/Cis. + Mild intermittent cough. + Night sweats, every other night, but not soaking/drenching sweats, mostly "just feel hot." This remains and unchanged  + Mild  neuropathy, L > R foot, intermittent, finger tips very intermittent and mild, both unchanged. No SOB. No N/V/D. Appetite improved but mild recent weight loss. No rash/pruritus. No arthralgia. No fevers. \par \par 12/14/22: Patient presents metastatic cholangiocarcinoma. She was on maintenance durvalumab. She has more pain in the abdomen. + fatigue, + mild neuropathy. Appetite\par CT c/ap on 12/8/22 showed increased in size of the primary hepatic tumor, hepatic, pulmonary, abd isak, omental and peritoneal metastatic dz, cosnsitent with PoD.\par \par 1/27/22: The patient was switched to 5-FU/onivyde. The patient is now s/p 2 cycles. She has worsening fatigue. Abdominal pain is improved. She also endorses some nausea and vomiting that occurs earlier in the cycle after infusion. \par

## 2023-02-08 NOTE — PHYSICAL EXAM
[Restricted in physically strenuous activity but ambulatory and able to carry out work of a light or sedentary nature] : Status 1- Restricted in physically strenuous activity but ambulatory and able to carry out work of a light or sedentary nature, e.g., light house work, office work [Normal] : normal appearance, no rash, nodules, vesicles, ulcers, erythema [de-identified] : + tachycardia

## 2023-02-08 NOTE — RESULTS/DATA
[FreeTextEntry1] : 12/13/22: CT ab/pelvis\par IMPRESSION:\par \par Increased size of the primary hepatic tumor as described\par \par Increased pulmonary, hepatic, abdominal isak, omental and peritoneal metastatic disease\par \par \par 2/8/22 CT CAP:\par Diffuse carcinomatosis with bulky omental implants predominantly in the right side of the abdomen. Prominence of the right adnexa measuring 3.5 x 3.7 cm. This may represent a primary ovarian carcinoma although this is not as large is typically seen with this degree of disease and other primary neoplasm may also be considered. Due to the large liver masses, suggestion of thrombus in the right portal vein and periportal lymphadenopathy, cholangiocarcinoma is also considered.\par Interval development of a few small pulmonary nodules may represent early metastatic disease.\par \par 2/6/22 U/S abdomen :\par When compared to prior CT scan there are multiple new hepatic lesions consistent with metastatic disease with evidence of periportal adenopathy without definitive pancreatic lesions seen. Given these findings are strongly recommend chest abdomen pelvic CT for further evaluation for primary source

## 2023-02-14 ENCOUNTER — APPOINTMENT (OUTPATIENT)
Age: 58
End: 2023-02-14

## 2023-02-14 ENCOUNTER — APPOINTMENT (OUTPATIENT)
Dept: HEMATOLOGY ONCOLOGY | Facility: CLINIC | Age: 58
End: 2023-02-14

## 2023-02-16 ENCOUNTER — APPOINTMENT (OUTPATIENT)
Age: 58
End: 2023-02-16

## 2023-02-24 ENCOUNTER — APPOINTMENT (OUTPATIENT)
Dept: CT IMAGING | Facility: CLINIC | Age: 58
End: 2023-02-24

## 2023-02-28 ENCOUNTER — APPOINTMENT (OUTPATIENT)
Age: 58
End: 2023-02-28

## 2023-02-28 ENCOUNTER — APPOINTMENT (OUTPATIENT)
Dept: HEMATOLOGY ONCOLOGY | Facility: CLINIC | Age: 58
End: 2023-02-28

## 2023-03-02 ENCOUNTER — APPOINTMENT (OUTPATIENT)
Age: 58
End: 2023-03-02

## 2023-03-02 ENCOUNTER — APPOINTMENT (OUTPATIENT)
Dept: HEMATOLOGY ONCOLOGY | Facility: CLINIC | Age: 58
End: 2023-03-02

## 2023-03-14 ENCOUNTER — APPOINTMENT (OUTPATIENT)
Dept: HEMATOLOGY ONCOLOGY | Facility: CLINIC | Age: 58
End: 2023-03-14

## 2023-03-14 ENCOUNTER — APPOINTMENT (OUTPATIENT)
Age: 58
End: 2023-03-14

## 2023-03-16 ENCOUNTER — APPOINTMENT (OUTPATIENT)
Age: 58
End: 2023-03-16

## 2023-03-20 NOTE — ED PROVIDER NOTE - DATA REVIEWED, MDM
[FreeTextEntry1] : 58-yo male with h/o HTN and hyperlipidemia.\par \par Patient presents for F/U cardiology visit. He was hospitalized on 03/07/20/23 for chest pain at [facility], he was found to have 60% ostial LAD stenosis on [facility]. Complains of palpitations with physical exertion. No CP since discharge.
vital signs

## 2023-03-28 ENCOUNTER — APPOINTMENT (OUTPATIENT)
Age: 58
End: 2023-03-28

## 2023-03-28 ENCOUNTER — APPOINTMENT (OUTPATIENT)
Dept: HEMATOLOGY ONCOLOGY | Facility: CLINIC | Age: 58
End: 2023-03-28

## 2023-03-30 ENCOUNTER — APPOINTMENT (OUTPATIENT)
Age: 58
End: 2023-03-30

## 2023-04-04 ENCOUNTER — APPOINTMENT (OUTPATIENT)
Dept: FAMILY MEDICINE | Facility: CLINIC | Age: 58
End: 2023-04-04

## 2023-04-11 ENCOUNTER — APPOINTMENT (OUTPATIENT)
Dept: HEMATOLOGY ONCOLOGY | Facility: CLINIC | Age: 58
End: 2023-04-11

## 2023-04-11 ENCOUNTER — APPOINTMENT (OUTPATIENT)
Age: 58
End: 2023-04-11

## 2023-04-13 ENCOUNTER — APPOINTMENT (OUTPATIENT)
Dept: HEMATOLOGY ONCOLOGY | Facility: CLINIC | Age: 58
End: 2023-04-13

## 2023-04-13 ENCOUNTER — APPOINTMENT (OUTPATIENT)
Age: 58
End: 2023-04-13

## 2023-07-05 NOTE — PATIENT PROFILE ADULT. - AS SC BRADEN ACTIVITY
"Subjective   Patient ID: Aimee Ponce is a 57 y.o. female who presents for MARILY ( discharge 2023/DX: A-fib/).        Patient: Aimee Ponce  : 1965  PCP: Lang Wu DO  MRN: [id number]  Program: No linked episodes     Aimee Ponce is a 57 y.o. female presenting today for follow-up after being discharged from the hospital 4 days ago. The main problem requiring admission was afib. The discharge summary and/or Transitional Care Management documentation was reviewed. Medication reconciliation was performed as indicated via the \"Georgi as Reviewed\" timestamp.     Aimee Ponce was contacted by Transitional Care Management services two days after her discharge. This encounter and supporting documentation was reviewed.    The complexity of medical decision making for this patient's transitional care is high.                Review of patient's family history indicates:  Problem: Hypertension      Relation: Mother          Name:               Age of Onset: (Not Specified)  Problem: Stroke      Relation: Father          Name:               Age of Onset: (Not Specified)  Problem: Hypertension      Relation: Father          Name:               Age of Onset: (Not Specified)      No data recorded    follow-ups 2023          Review of Systems   Constitutional:  Positive for fatigue. Negative for activity change, appetite change, chills, diaphoresis, fever and unexpected weight change.   HENT:  Negative for congestion, ear pain, hearing loss, nosebleeds, postnasal drip, rhinorrhea, sinus pressure, sneezing, sore throat, tinnitus, trouble swallowing and voice change.    Eyes:  Negative for photophobia, pain, discharge, redness, itching and visual disturbance.   Respiratory:  Negative for cough, choking, chest tightness, shortness of breath and wheezing.    Cardiovascular:  Negative for chest pain, palpitations and leg swelling.   Gastrointestinal:  Negative for abdominal distention, abdominal pain, blood in " "stool, constipation, diarrhea, nausea and vomiting.   Endocrine: Negative for cold intolerance, heat intolerance, polydipsia and polyuria.   Genitourinary:  Negative for dysuria, flank pain, frequency, hematuria and urgency.   Musculoskeletal:  Negative for arthralgias, back pain, joint swelling, myalgias, neck pain and neck stiffness.   Skin:  Positive for rash. Negative for wound.   Allergic/Immunologic: Negative for immunocompromised state.   Neurological:  Negative for dizziness, tremors, seizures, syncope, facial asymmetry, speech difficulty, weakness, light-headedness, numbness and headaches.   Hematological:  Negative for adenopathy. Does not bruise/bleed easily.   Psychiatric/Behavioral:  Negative for agitation, behavioral problems, confusion, dysphoric mood, hallucinations, self-injury, sleep disturbance and suicidal ideas. The patient is not nervous/anxious.        Objective   /83 (BP Location: Right arm, Patient Position: Sitting, BP Cuff Size: Large adult)   Pulse 54   Temp 36.5 °C (97.7 °F) (Temporal)   Resp 18   Ht 1.651 m (5' 5\")   Wt 93.9 kg (207 lb)   SpO2 97%   BMI 34.45 kg/m²   Physical Exam  Constitutional:       General: She is not in acute distress.     Appearance: She is obese. She is not ill-appearing or diaphoretic.   HENT:      Head: Normocephalic and atraumatic.      Right Ear: External ear normal.      Left Ear: External ear normal.      Nose: Nose normal. No rhinorrhea.   Eyes:      General: Lids are normal. No scleral icterus.        Right eye: No discharge.         Left eye: No discharge.      Conjunctiva/sclera: Conjunctivae normal.   Cardiovascular:      Rate and Rhythm: Normal rate and regular rhythm.      Pulses: Normal pulses.      Heart sounds: No murmur heard.  Pulmonary:      Effort: Pulmonary effort is normal. No respiratory distress.      Breath sounds: No decreased breath sounds, wheezing, rhonchi or rales.   Abdominal:      General: Bowel sounds are normal. " There is no distension.      Palpations: Abdomen is soft. There is no mass.      Tenderness: There is no abdominal tenderness. There is no guarding or rebound.   Musculoskeletal:         General: No swelling, tenderness or deformity.      Cervical back: No rigidity or tenderness.      Right lower leg: No edema.      Left lower leg: No edema.   Lymphadenopathy:      Cervical: No cervical adenopathy.      Upper Body:      Right upper body: No supraclavicular adenopathy.      Left upper body: No supraclavicular adenopathy.   Skin:     General: Skin is warm and dry.      Coloration: Skin is not jaundiced or pale.      Findings: Rash present. No erythema or lesion.             Comments: Small area of flaking rash on erythematous base at glabella   Neurological:      General: No focal deficit present.      Mental Status: She is alert and oriented to person, place, and time.      Sensory: No sensory deficit.      Motor: No weakness or tremor.      Coordination: Coordination normal.      Gait: Gait normal.   Psychiatric:         Mood and Affect: Mood normal. Affect is not inappropriate.         Behavior: Behavior normal.         Assessment/Plan   Problem List Items Addressed This Visit       Paroxysmal atrial fibrillation with RVR (CMS/HCC) - Primary     Other Visit Diagnoses       Seborrhea        Relevant Medications    ketoconazole (NIZOral) 2 % cream    Class 1 obesity due to excess calories with serious comorbidity and body mass index (BMI) of 34.0 to 34.9 in adult            As far as the atrial fib issues recommend continue to follow through with electrophysiology.  She would like to consider invasive/more permanent options as she is getting frustrated with recurrent issues.  I would like her to discuss with EP potential risks versus benefits of different therapy.  Patient stated understanding.  As far as seborrhea we will do trial of ketoconazole cream twice daily until rash resolved and may utilize as often as  necessary as there will likely be some recurrence.  As far as weight.  I discussed diet, exercise issues with her.  She unfortunately has difficulty exercising due to her concerns regarding recurrent A-fib and being away from her house when she gets symptomatic.  I would like her to maintain a minimum of 30 minutes of gentle aerobic activity daily and reduce caloric intake by 10%.  Keep next regular scheduled follow-up appointment   (4) walks frequently

## 2023-11-21 NOTE — ED ADULT NURSE REASSESSMENT NOTE - NIH STROKE SCALE: 11. EXTINCTION AND INATTENTION, QM
high-stress periods. These events might trigger slip-ups. Decide on your first few steps. Most people have more success when they make small changes, one step at a time. For example, you might switch a daily candy bar to a piece of fruit, walk 10 minutes more, or add more vegetables to a meal.  Line up your support people. Make sure you're not going to be alone as you make this change. Connect with people who understand how important it is to you. Ask family members and friends for help in keeping with your plan. And think about who could make it harder for you, and how to handle them. Try tracking. People who keep track of what they eat, feel, and do are better at losing weight. Try writing down things like:  What and how much you eat. How you feel before and after each meal.  Details about each meal (like eating out or at home, eating alone, or with friends or family). What you do to be active. Look and plan. As you track, look for patterns that you may want to change. Take note of: When you eat and whether you skip meals. How often you eat out. How many fruits and vegetables you eat. When you eat beyond feeling full. When and why you eat for reasons other than being hungry. When you stray from your plan, don't get upset. Figure out what made you slip up and how you can fix it. Can you take medicines or have surgery to lose weight? If you have a BMI in a certain range and have not been able to lose weight with diet and exercise, medicine or surgery may be an option for you. If you have a BMI of at least 30.0 (or a BMI of at least 27.0 and another health problem related to your weight), ask your doctor about weight-loss medicines. They work by making you feel less hungry, making you feel full more quickly, or changing how you digest fat. Medicines are used along with diet changes and more physical activity to help you make lasting changes.   If you have a BMI of 40.0 or more (or a BMI of 35.0 or more
(0) No abnormality
(0) No abnormality

## 2024-04-26 NOTE — H&P ADULT - CLICK TO LAUNCH ORM
Spiritual Care Visit Note    Patient Name: Jamilah Hill Date of Spiritual Care Visit: 24   : 1930 Primary Dx: <principal problem not specified>       Referred By: Referral From: Nurse    Spiritual Care Taxonomy:    Intended Effects: Establish rapport and connectedness    Methods: Collaborate with care team member;Offer support    Interventions: Active listening;Ask guided questions;Silent prayer    Visit Type/Summary:     - Spiritual Care: Consulted with RN prior to visit. Attempted visit. Patient is unavailable. RN will inform spiritual care if patient wants a visit later. Left a Spiritual Care contact information card.    Spiritual Care support can be requested via an [facility] consult. For urgent/immediate needs, please contact the On Call  at: [place]: ext [phone]    CELESTINE Booker   [phone]           .

## 2024-07-16 NOTE — ED ADULT TRIAGE NOTE - BP NONINVASIVE DIASTOLIC (MM HG)
Pt needs at least vv to address (any provider ) to select appropriate med to change to     Pls ensure no si/hi    to er if so     Will refill sertraline until vv - pls have pt get on cancellation list to get in sooner rather than later        80

## 2024-10-28 NOTE — ED ADULT NURSE NOTE - NSFALLRSKPASTHIST_ED_ALL_ED
"Nurse Triage SBAR    Is this a 2nd Level Triage? YES, LICENSED PRACTITIONER REVIEW IS REQUIRED    Situation: Patient called in requesting an order for mammogram and ultrasound for lump in breast.     Background: Patient reports she has several \"simple cysts\" that have to be drained. Reports she has had three of them drained total.     Per chart review, last Mammogram, ultrasound and \"ultrasound breast aspiration cyst initial left\" performed on 11/21/22.     Assessment: Patient reports painful lump in left breast just below nipple in the middle.   Reports she noticed lump a week ago.   Reports it is tender to touch.   States it looks exactly like other cysts that have been drained in the past.     Denies fever, redness or rash, nipple discharge, chest pain.     Protocol Recommended Disposition:   See in Office Within 3 Days    Recommendation: Patient is requesting order/referral for mammogram and ultrasound (with aspiration if needed).  Patient is hoping this can be completed without a provider visit due to cost and insurance issues, but is open to scheduling appointment if necessary.     Routing to provider to review and advise.     Advised patient to call back with new or worsening symptoms in the meantime.     Temi Alicea RN  [facility]  " no
